# Patient Record
Sex: MALE | Race: WHITE | NOT HISPANIC OR LATINO
[De-identification: names, ages, dates, MRNs, and addresses within clinical notes are randomized per-mention and may not be internally consistent; named-entity substitution may affect disease eponyms.]

---

## 2018-11-21 ENCOUNTER — APPOINTMENT (OUTPATIENT)
Dept: ORTHOPEDIC SURGERY | Facility: CLINIC | Age: 63
End: 2018-11-21

## 2018-12-03 ENCOUNTER — APPOINTMENT (OUTPATIENT)
Dept: ORTHOPEDIC SURGERY | Facility: CLINIC | Age: 63
End: 2018-12-03
Payer: COMMERCIAL

## 2018-12-03 VITALS — SYSTOLIC BLOOD PRESSURE: 128 MMHG | HEART RATE: 78 BPM | DIASTOLIC BLOOD PRESSURE: 81 MMHG

## 2018-12-03 VITALS — WEIGHT: 245 LBS | BODY MASS INDEX: 35.07 KG/M2 | HEIGHT: 70 IN

## 2018-12-03 DIAGNOSIS — M51.26 OTHER INTERVERTEBRAL DISC DISPLACEMENT, LUMBAR REGION: ICD-10-CM

## 2018-12-03 DIAGNOSIS — M48.07 SPINAL STENOSIS, LUMBOSACRAL REGION: ICD-10-CM

## 2018-12-03 PROCEDURE — 99204 OFFICE O/P NEW MOD 45 MIN: CPT

## 2018-12-05 RX ORDER — DICLOFENAC SODIUM 75 MG/1
75 TABLET, DELAYED RELEASE ORAL
Qty: 1 | Refills: 1 | Status: ACTIVE | COMMUNITY
Start: 2018-12-05 | End: 1900-01-01

## 2022-08-01 ENCOUNTER — OUTPATIENT (OUTPATIENT)
Dept: OUTPATIENT SERVICES | Facility: HOSPITAL | Age: 67
LOS: 1 days | End: 2022-08-01
Payer: MEDICARE

## 2022-08-01 VITALS
RESPIRATION RATE: 14 BRPM | WEIGHT: 259.93 LBS | SYSTOLIC BLOOD PRESSURE: 143 MMHG | OXYGEN SATURATION: 97 % | HEIGHT: 70 IN | TEMPERATURE: 97 F | HEART RATE: 86 BPM | DIASTOLIC BLOOD PRESSURE: 79 MMHG

## 2022-08-01 DIAGNOSIS — N40.1 BENIGN PROSTATIC HYPERPLASIA WITH LOWER URINARY TRACT SYMPTOMS: ICD-10-CM

## 2022-08-01 DIAGNOSIS — Z98.890 OTHER SPECIFIED POSTPROCEDURAL STATES: Chronic | ICD-10-CM

## 2022-08-01 DIAGNOSIS — Z01.818 ENCOUNTER FOR OTHER PREPROCEDURAL EXAMINATION: ICD-10-CM

## 2022-08-01 LAB
A1C WITH ESTIMATED AVERAGE GLUCOSE RESULT: 6.3 % — HIGH (ref 4–5.6)
ALBUMIN SERPL ELPH-MCNC: 3.8 G/DL — SIGNIFICANT CHANGE UP (ref 3.3–5)
ALP SERPL-CCNC: 82 U/L — SIGNIFICANT CHANGE UP (ref 40–120)
ALT FLD-CCNC: 30 U/L — SIGNIFICANT CHANGE UP (ref 12–78)
ANION GAP SERPL CALC-SCNC: 7 MMOL/L — SIGNIFICANT CHANGE UP (ref 5–17)
APPEARANCE UR: ABNORMAL
AST SERPL-CCNC: 18 U/L — SIGNIFICANT CHANGE UP (ref 15–37)
BILIRUB SERPL-MCNC: 0.7 MG/DL — SIGNIFICANT CHANGE UP (ref 0.2–1.2)
BILIRUB UR-MCNC: NEGATIVE — SIGNIFICANT CHANGE UP
BUN SERPL-MCNC: 19 MG/DL — SIGNIFICANT CHANGE UP (ref 7–23)
CALCIUM SERPL-MCNC: 9.4 MG/DL — SIGNIFICANT CHANGE UP (ref 8.5–10.1)
CHLORIDE SERPL-SCNC: 110 MMOL/L — HIGH (ref 96–108)
CO2 SERPL-SCNC: 24 MMOL/L — SIGNIFICANT CHANGE UP (ref 22–31)
COLOR SPEC: YELLOW — SIGNIFICANT CHANGE UP
CREAT SERPL-MCNC: 1 MG/DL — SIGNIFICANT CHANGE UP (ref 0.5–1.3)
DIFF PNL FLD: ABNORMAL
EGFR: 82 ML/MIN/1.73M2 — SIGNIFICANT CHANGE UP
ESTIMATED AVERAGE GLUCOSE: 134 MG/DL — HIGH (ref 68–114)
GLUCOSE SERPL-MCNC: 124 MG/DL — HIGH (ref 70–99)
GLUCOSE UR QL: NEGATIVE — SIGNIFICANT CHANGE UP
HCT VFR BLD CALC: 46.7 % — SIGNIFICANT CHANGE UP (ref 39–50)
HGB BLD-MCNC: 15.1 G/DL — SIGNIFICANT CHANGE UP (ref 13–17)
KETONES UR-MCNC: NEGATIVE — SIGNIFICANT CHANGE UP
LEUKOCYTE ESTERASE UR-ACNC: ABNORMAL
MCHC RBC-ENTMCNC: 28.2 PG — SIGNIFICANT CHANGE UP (ref 27–34)
MCHC RBC-ENTMCNC: 32.3 GM/DL — SIGNIFICANT CHANGE UP (ref 32–36)
MCV RBC AUTO: 87.1 FL — SIGNIFICANT CHANGE UP (ref 80–100)
NITRITE UR-MCNC: NEGATIVE — SIGNIFICANT CHANGE UP
NRBC # BLD: 0 /100 WBCS — SIGNIFICANT CHANGE UP (ref 0–0)
PH UR: 5 — SIGNIFICANT CHANGE UP (ref 5–8)
PLATELET # BLD AUTO: 282 K/UL — SIGNIFICANT CHANGE UP (ref 150–400)
POTASSIUM SERPL-MCNC: 4.5 MMOL/L — SIGNIFICANT CHANGE UP (ref 3.5–5.3)
POTASSIUM SERPL-SCNC: 4.5 MMOL/L — SIGNIFICANT CHANGE UP (ref 3.5–5.3)
PROT SERPL-MCNC: 7.6 G/DL — SIGNIFICANT CHANGE UP (ref 6–8.3)
PROT UR-MCNC: 100
RBC # BLD: 5.36 M/UL — SIGNIFICANT CHANGE UP (ref 4.2–5.8)
RBC # FLD: 13.2 % — SIGNIFICANT CHANGE UP (ref 10.3–14.5)
SODIUM SERPL-SCNC: 141 MMOL/L — SIGNIFICANT CHANGE UP (ref 135–145)
SP GR SPEC: 1.02 — SIGNIFICANT CHANGE UP (ref 1.01–1.02)
UROBILINOGEN FLD QL: NEGATIVE — SIGNIFICANT CHANGE UP
WBC # BLD: 9.75 K/UL — SIGNIFICANT CHANGE UP (ref 3.8–10.5)
WBC # FLD AUTO: 9.75 K/UL — SIGNIFICANT CHANGE UP (ref 3.8–10.5)

## 2022-08-01 PROCEDURE — 80053 COMPREHEN METABOLIC PANEL: CPT

## 2022-08-01 PROCEDURE — 85027 COMPLETE CBC AUTOMATED: CPT

## 2022-08-01 PROCEDURE — 36415 COLL VENOUS BLD VENIPUNCTURE: CPT

## 2022-08-01 PROCEDURE — G0463: CPT

## 2022-08-01 PROCEDURE — 81001 URINALYSIS AUTO W/SCOPE: CPT

## 2022-08-01 PROCEDURE — 93005 ELECTROCARDIOGRAM TRACING: CPT

## 2022-08-01 PROCEDURE — 93010 ELECTROCARDIOGRAM REPORT: CPT

## 2022-08-01 PROCEDURE — 87086 URINE CULTURE/COLONY COUNT: CPT

## 2022-08-01 PROCEDURE — 83036 HEMOGLOBIN GLYCOSYLATED A1C: CPT

## 2022-08-01 NOTE — H&P PST ADULT - LAST CARDIAC ANGIOGRAM/IMAGING
Carotid Doppler 9/10/2021 20% Bilateral carotid disease, vertebral arteries show antegrade flow ALSO 7/25/22 Normal Size AAA Carotid Doppler 9/10/2021 20% Bilateral carotid disease, vertebral arteries show antegrade flow ALSO 7/25/22 Normal Size AAA as per cardiologist pt DOES NOT HAVE AN AAA

## 2022-08-01 NOTE — H&P PST ADULT - NSICDXPASTMEDICALHX_GEN_ALL_CORE_FT
PAST MEDICAL HISTORY:  Anxiety     Asthma     Bilateral carpal tunnel syndrome     Coronary artery disease     Glaucoma bilat. " elevated pressure"    Herniated disc     Hyperlipidemia     Hypertension     Kidney stone on right side     Neuropathy fingers ,hands ,feet-bilateral    Type 2 diabetes mellitus      PAST MEDICAL HISTORY:  Anxiety     Asthma Denies use of Inhaler at this time    Bilateral carpal tunnel syndrome     Coronary artery disease     Glaucoma bilat. " elevated pressure"- notes eye drops have been stopped for a year now    Herniated disc     Hyperlipidemia     Hypertension     Kidney stone on right side     Neuropathy fingers ,hands ,feet-bilateral    Stented coronary artery 2010 RIGHT Coronary Artery Stent    Type 2 diabetes mellitus

## 2022-08-01 NOTE — H&P PST ADULT - LAST ECHOCARDIOGRAM
ECHO 7/25/22= Left ventricle cavity normal in size, normal global wall motion, LVEF = 65% mild tricuspid regurgitation trace mild regurgitation

## 2022-08-01 NOTE — H&P PST ADULT - GENERAL COMMENTS
Denies any travel in the last 2 weeks - denies any current covid symptoms or any recent exposure to anyone with known or suspected covid

## 2022-08-01 NOTE — H&P PST ADULT - PROBLEM SELECTOR PLAN 1
PST Labs; CBC, CMP, HgB A1C, U/A, Urine Culture, EKG. Medical and Cardiac clearances on chart. Pt instructed to stop any NSAIDS/Herbal Supplements between now and procedure. PST Labs; CBC, CMP, HgB A1C, U/A, Urine Culture, EKG. Medical and Cardiac clearances on chart. Pt instructed to stop any NSAIDS/Herbal Supplements between now and procedure. He was instructed to HOLD his Metformin morning of procedure. He presently does NOT see Endo for management of his Diabetes his PCP manages his Diabetes. Notes his last A1C was on the 5's and he does not normally check blood glucose levels at home. Discussed with pt he would need to see Endo if today's A1C comes back > 9 however I do not think that will be the case. Morning of procedure he may take his Amlodipine, Irbesartan, Atorvastatin and his Baby ASA with small sip of water. Pt WILL REMAIN on his Baby ASA secondary to presence of a cardiac stent. This has been confirmed with Dr Beard. Pre-op instructions given to pt with understanding verbalized. Discussed with pt he would need to have a COVID swab done at [facility] drive thru prior to procedure. Informed pt [facility]mitting department will contact him to schedule his COVID swab. Provided pt with both address as well as phone number to call [facility] should he have any questions. Pr verbalizes understanding of all instructions discussed today at [facility]. All questions addressed with pt prior to him leaving the [facility] department today.

## 2022-08-01 NOTE — H&P PST ADULT - NSICDXPASTSURGICALHX_GEN_ALL_CORE_FT
PAST SURGICAL HISTORY:  S/P angioplasty with stent x 1 2011    S/P hernia surgery 2010    S/P shoulder surgery 1971, 1975    Tendon laceration left hand 1988     PAST SURGICAL HISTORY:  H/O colonoscopy     S/P angioplasty with stent x 1 2011    S/P hernia surgery 2010    S/P shoulder surgery 1971, 1975- LEFT Shoulder    Tendon laceration left hand 1988

## 2022-08-01 NOTE — H&P PST ADULT - NEGATIVE ENMT SYMPTOMS
no hearing difficulty/no sinus symptoms/no nasal congestion/no nasal discharge/no abnormal taste sensation/no gum bleeding/no throat pain/no dysphagia

## 2022-08-01 NOTE — H&P PST ADULT - HISTORY OF PRESENT ILLNESS
67 year old male PMH CAD, FEI,  HTN, Type 2 DM, Hypercholesterolemia, heart Murmur,  Arthritis, back Pain, Enlarged Prostate with Lower Urinary Tract Symptoms; presents today for PST prior to Greenlight laser Prostate with Dr Himanshu Beard on 8/12/2022.  67 year old male PMH CAD, FEI, (not currently on CPAP), HTN, Type 2 DM, Hypercholesterolemia, Heart Murmur, Glaucoma (Currently off eye drops),  Arthritis, back Pain, Enlarged Prostate with Lower Urinary Tract Symptoms; presents today for PST prior to Greenlight laser Prostate with Dr Himanshu Beard on 8/12/2022. Pt notes H/O enlarged prostate; notes difficulty fully emptying his bladder, increased urinary frequency as well as nocturia. Denies any Dysuria or hematuria. Following discussions with Dr Beard regarding treatment options pt is electing for scheduled procedure.

## 2022-08-01 NOTE — H&P PST ADULT - ASSESSMENT
67 year old male PMH HTN, Type 2 DM, Hypercholesterolemia, heart Murmur,  Arthritis, back Pain, Enlarged Prostate with Lower Urinary Tract Symptoms; scheduled for Greenlight laser Prostate with Dr Himanshu Beard on 8/12/2022.      67 year old male PMH CAD, FEI, (not currently on CPAP), HTN, Type 2 DM, Hypercholesterolemia, Heart Murmur, Glaucoma (Currently off eye drops),  Arthritis, back Pain, Enlarged Prostate with Lower Urinary Tract Symptoms; presents today for PST prior to Greenlight laser Prostate with Dr Himanshu Beard on 8/12/2022. Pt notes H/O enlarged prostate; notes difficulty fully emptying his bladder, increased urinary frequency as well as nocturia. Denies any Dysuria or hematuria. Following discussions with Dr Beard regarding treatment options pt is electing for scheduled procedure.

## 2022-08-02 LAB
CULTURE RESULTS: NO GROWTH — SIGNIFICANT CHANGE UP
SPECIMEN SOURCE: SIGNIFICANT CHANGE UP

## 2022-08-09 PROBLEM — E11.9 TYPE 2 DIABETES MELLITUS WITHOUT COMPLICATIONS: Chronic | Status: ACTIVE | Noted: 2022-08-01

## 2022-08-09 PROBLEM — Z95.5 PRESENCE OF CORONARY ANGIOPLASTY IMPLANT AND GRAFT: Chronic | Status: ACTIVE | Noted: 2022-08-01

## 2022-08-10 ENCOUNTER — OUTPATIENT (OUTPATIENT)
Dept: OUTPATIENT SERVICES | Facility: HOSPITAL | Age: 67
LOS: 1 days | End: 2022-08-10
Payer: MEDICARE

## 2022-08-10 DIAGNOSIS — Z20.828 CONTACT WITH AND (SUSPECTED) EXPOSURE TO OTHER VIRAL COMMUNICABLE DISEASES: ICD-10-CM

## 2022-08-10 DIAGNOSIS — Z98.890 OTHER SPECIFIED POSTPROCEDURAL STATES: Chronic | ICD-10-CM

## 2022-08-10 LAB — SARS-COV-2 RNA SPEC QL NAA+PROBE: SIGNIFICANT CHANGE UP

## 2022-08-10 PROCEDURE — U0003: CPT

## 2022-08-10 PROCEDURE — U0005: CPT

## 2022-08-12 ENCOUNTER — OUTPATIENT (OUTPATIENT)
Dept: OUTPATIENT SERVICES | Facility: HOSPITAL | Age: 67
LOS: 1 days | End: 2022-08-12
Payer: MEDICARE

## 2022-08-12 DIAGNOSIS — Z98.890 OTHER SPECIFIED POSTPROCEDURAL STATES: Chronic | ICD-10-CM

## 2022-08-12 PROCEDURE — 82962 GLUCOSE BLOOD TEST: CPT

## 2022-08-15 DIAGNOSIS — N40.1 BENIGN PROSTATIC HYPERPLASIA WITH LOWER URINARY TRACT SYMPTOMS: ICD-10-CM

## 2022-09-27 ENCOUNTER — OUTPATIENT (OUTPATIENT)
Dept: OUTPATIENT SERVICES | Facility: HOSPITAL | Age: 67
LOS: 1 days | End: 2022-09-27
Payer: MEDICARE

## 2022-09-27 ENCOUNTER — TRANSCRIPTION ENCOUNTER (OUTPATIENT)
Age: 67
End: 2022-09-27

## 2022-09-27 DIAGNOSIS — Z98.890 OTHER SPECIFIED POSTPROCEDURAL STATES: Chronic | ICD-10-CM

## 2022-09-27 DIAGNOSIS — N40.1 BENIGN PROSTATIC HYPERPLASIA WITH LOWER URINARY TRACT SYMPTOMS: ICD-10-CM

## 2022-09-27 DIAGNOSIS — Z11.59 ENCOUNTER FOR SCREENING FOR OTHER VIRAL DISEASES: ICD-10-CM

## 2022-09-27 LAB
ANION GAP SERPL CALC-SCNC: 4 MMOL/L — LOW (ref 5–17)
APPEARANCE UR: ABNORMAL
BILIRUB UR-MCNC: NEGATIVE — SIGNIFICANT CHANGE UP
BUN SERPL-MCNC: 18 MG/DL — SIGNIFICANT CHANGE UP (ref 7–23)
CALCIUM SERPL-MCNC: 9.2 MG/DL — SIGNIFICANT CHANGE UP (ref 8.5–10.1)
CHLORIDE SERPL-SCNC: 108 MMOL/L — SIGNIFICANT CHANGE UP (ref 96–108)
CO2 SERPL-SCNC: 29 MMOL/L — SIGNIFICANT CHANGE UP (ref 22–31)
COLOR SPEC: YELLOW — SIGNIFICANT CHANGE UP
CREAT SERPL-MCNC: 1.1 MG/DL — SIGNIFICANT CHANGE UP (ref 0.5–1.3)
DIFF PNL FLD: ABNORMAL
EGFR: 73 ML/MIN/1.73M2 — SIGNIFICANT CHANGE UP
GLUCOSE SERPL-MCNC: 119 MG/DL — HIGH (ref 70–99)
GLUCOSE UR QL: NEGATIVE — SIGNIFICANT CHANGE UP
HCT VFR BLD CALC: 44.9 % — SIGNIFICANT CHANGE UP (ref 39–50)
HGB BLD-MCNC: 14.5 G/DL — SIGNIFICANT CHANGE UP (ref 13–17)
KETONES UR-MCNC: NEGATIVE — SIGNIFICANT CHANGE UP
LEUKOCYTE ESTERASE UR-ACNC: ABNORMAL
MCHC RBC-ENTMCNC: 27.8 PG — SIGNIFICANT CHANGE UP (ref 27–34)
MCHC RBC-ENTMCNC: 32.3 GM/DL — SIGNIFICANT CHANGE UP (ref 32–36)
MCV RBC AUTO: 86.2 FL — SIGNIFICANT CHANGE UP (ref 80–100)
NITRITE UR-MCNC: NEGATIVE — SIGNIFICANT CHANGE UP
NRBC # BLD: 0 /100 WBCS — SIGNIFICANT CHANGE UP (ref 0–0)
PH UR: 5 — SIGNIFICANT CHANGE UP (ref 5–8)
PLATELET # BLD AUTO: 278 K/UL — SIGNIFICANT CHANGE UP (ref 150–400)
POTASSIUM SERPL-MCNC: 4.8 MMOL/L — SIGNIFICANT CHANGE UP (ref 3.5–5.3)
POTASSIUM SERPL-SCNC: 4.8 MMOL/L — SIGNIFICANT CHANGE UP (ref 3.5–5.3)
PROT UR-MCNC: 30 MG/DL
RBC # BLD: 5.21 M/UL — SIGNIFICANT CHANGE UP (ref 4.2–5.8)
RBC # FLD: 13.6 % — SIGNIFICANT CHANGE UP (ref 10.3–14.5)
SARS-COV-2 RNA SPEC QL NAA+PROBE: SIGNIFICANT CHANGE UP
SODIUM SERPL-SCNC: 141 MMOL/L — SIGNIFICANT CHANGE UP (ref 135–145)
SP GR SPEC: 1.02 — SIGNIFICANT CHANGE UP (ref 1.01–1.02)
UROBILINOGEN FLD QL: NEGATIVE — SIGNIFICANT CHANGE UP
WBC # BLD: 9.9 K/UL — SIGNIFICANT CHANGE UP (ref 3.8–10.5)
WBC # FLD AUTO: 9.9 K/UL — SIGNIFICANT CHANGE UP (ref 3.8–10.5)

## 2022-09-27 PROCEDURE — 87086 URINE CULTURE/COLONY COUNT: CPT

## 2022-09-27 PROCEDURE — 87635 SARS-COV-2 COVID-19 AMP PRB: CPT

## 2022-09-27 PROCEDURE — 85027 COMPLETE CBC AUTOMATED: CPT

## 2022-09-27 PROCEDURE — 81001 URINALYSIS AUTO W/SCOPE: CPT

## 2022-09-27 PROCEDURE — 36415 COLL VENOUS BLD VENIPUNCTURE: CPT

## 2022-09-27 PROCEDURE — 80048 BASIC METABOLIC PNL TOTAL CA: CPT

## 2022-09-27 NOTE — ASU PATIENT PROFILE, ADULT - NSICDXPASTSURGICALHX_GEN_ALL_CORE_FT
PAST SURGICAL HISTORY:  H/O colonoscopy     S/P angioplasty with stent x 1 2011    S/P hernia surgery 2010    S/P shoulder surgery 1971, 1975- LEFT Shoulder    Tendon laceration left hand 1988

## 2022-09-27 NOTE — ASU PATIENT PROFILE, ADULT - NSICDXPASTMEDICALHX_GEN_ALL_CORE_FT
PAST MEDICAL HISTORY:  Anxiety     Asthma Denies use of Inhaler at this time    Bilateral carpal tunnel syndrome     Coronary artery disease     Glaucoma bilat. " elevated pressure"- notes eye drops have been stopped for a year now    Herniated disc     Hyperlipidemia     Hypertension     Kidney stone on right side     Neuropathy fingers ,hands ,feet-bilateral    Stented coronary artery 2010 RIGHT Coronary Artery Stent    Type 2 diabetes mellitus

## 2022-09-27 NOTE — ASU PATIENT PROFILE, ADULT - FALL HARM RISK - UNIVERSAL INTERVENTIONS
Bed in lowest position, wheels locked, appropriate side rails in place/Call bell, personal items and telephone in reach/Instruct patient to call for assistance before getting out of bed or chair/Non-slip footwear when patient is out of bed/[place] to call system/Physically safe environment - no spills, clutter or unnecessary equipment/Purposeful Proactive Rounding/Room/bathroom lighting operational, light cord in reach

## 2022-09-28 ENCOUNTER — TRANSCRIPTION ENCOUNTER (OUTPATIENT)
Age: 67
End: 2022-09-28

## 2022-09-28 ENCOUNTER — RESULT REVIEW (OUTPATIENT)
Age: 67
End: 2022-09-28

## 2022-09-28 ENCOUNTER — OUTPATIENT (OUTPATIENT)
Dept: OUTPATIENT SERVICES | Facility: HOSPITAL | Age: 67
LOS: 1 days | End: 2022-09-28
Payer: MEDICARE

## 2022-09-28 VITALS
HEART RATE: 85 BPM | OXYGEN SATURATION: 98 % | RESPIRATION RATE: 18 BRPM | SYSTOLIC BLOOD PRESSURE: 154 MMHG | WEIGHT: 259.93 LBS | DIASTOLIC BLOOD PRESSURE: 84 MMHG | TEMPERATURE: 98 F | HEIGHT: 70 IN

## 2022-09-28 VITALS
DIASTOLIC BLOOD PRESSURE: 76 MMHG | HEART RATE: 78 BPM | RESPIRATION RATE: 16 BRPM | TEMPERATURE: 97 F | SYSTOLIC BLOOD PRESSURE: 114 MMHG | OXYGEN SATURATION: 97 %

## 2022-09-28 DIAGNOSIS — Z98.890 OTHER SPECIFIED POSTPROCEDURAL STATES: Chronic | ICD-10-CM

## 2022-09-28 DIAGNOSIS — N40.1 BENIGN PROSTATIC HYPERPLASIA WITH LOWER URINARY TRACT SYMPTOMS: ICD-10-CM

## 2022-09-28 LAB
CULTURE RESULTS: SIGNIFICANT CHANGE UP
SPECIMEN SOURCE: SIGNIFICANT CHANGE UP

## 2022-09-28 PROCEDURE — 88300 SURGICAL PATH GROSS: CPT | Mod: 26

## 2022-09-28 PROCEDURE — 88300 SURGICAL PATH GROSS: CPT

## 2022-09-28 PROCEDURE — 52353 CYSTOURETERO W/LITHOTRIPSY: CPT

## 2022-09-28 PROCEDURE — 52648 LASER SURGERY OF PROSTATE: CPT

## 2022-09-28 PROCEDURE — C1889: CPT

## 2022-09-28 PROCEDURE — 82365 CALCULUS SPECTROSCOPY: CPT

## 2022-09-28 PROCEDURE — 82962 GLUCOSE BLOOD TEST: CPT

## 2022-09-28 DEVICE — LASER FIBER HOLMIUM 600: Type: IMPLANTABLE DEVICE | Status: FUNCTIONAL

## 2022-09-28 DEVICE — FIBER MOXY REG: Type: IMPLANTABLE DEVICE | Status: FUNCTIONAL

## 2022-09-28 RX ORDER — SODIUM CHLORIDE 9 MG/ML
1000 INJECTION, SOLUTION INTRAVENOUS
Refills: 0 | Status: DISCONTINUED | OUTPATIENT
Start: 2022-09-28 | End: 2022-09-28

## 2022-09-28 RX ORDER — CEFUROXIME AXETIL 250 MG
1 TABLET ORAL
Qty: 6 | Refills: 0
Start: 2022-09-28 | End: 2022-09-30

## 2022-09-28 RX ORDER — DULAGLUTIDE 4.5 MG/.5ML
0 INJECTION, SOLUTION SUBCUTANEOUS
Qty: 0 | Refills: 0 | DISCHARGE

## 2022-09-28 RX ORDER — CEFTRIAXONE 500 MG/1
1000 INJECTION, POWDER, FOR SOLUTION INTRAMUSCULAR; INTRAVENOUS ONCE
Refills: 0 | Status: COMPLETED | OUTPATIENT
Start: 2022-09-28 | End: 2022-09-28

## 2022-09-28 RX ORDER — HYDROMORPHONE HYDROCHLORIDE 2 MG/ML
0.5 INJECTION INTRAMUSCULAR; INTRAVENOUS; SUBCUTANEOUS
Refills: 0 | Status: DISCONTINUED | OUTPATIENT
Start: 2022-09-28 | End: 2022-09-28

## 2022-09-28 RX ORDER — AMLODIPINE BESYLATE 2.5 MG/1
1 TABLET ORAL
Qty: 0 | Refills: 0 | DISCHARGE

## 2022-09-28 RX ORDER — TAMSULOSIN HYDROCHLORIDE 0.4 MG/1
2 CAPSULE ORAL
Qty: 0 | Refills: 0 | DISCHARGE

## 2022-09-28 RX ORDER — METOCLOPRAMIDE HCL 10 MG
10 TABLET ORAL ONCE
Refills: 0 | Status: DISCONTINUED | OUTPATIENT
Start: 2022-09-28 | End: 2022-09-28

## 2022-09-28 RX ORDER — HYDROMORPHONE HYDROCHLORIDE 2 MG/ML
1 INJECTION INTRAMUSCULAR; INTRAVENOUS; SUBCUTANEOUS
Refills: 0 | Status: DISCONTINUED | OUTPATIENT
Start: 2022-09-28 | End: 2022-09-28

## 2022-09-28 RX ORDER — DUTASTERIDE 0.5 MG/1
1 CAPSULE, LIQUID FILLED ORAL
Qty: 0 | Refills: 0 | DISCHARGE

## 2022-09-28 RX ORDER — ASPIRIN/CALCIUM CARB/MAGNESIUM 324 MG
1 TABLET ORAL
Qty: 0 | Refills: 0 | DISCHARGE

## 2022-09-28 RX ORDER — ATORVASTATIN CALCIUM 80 MG/1
1 TABLET, FILM COATED ORAL
Qty: 0 | Refills: 0 | DISCHARGE

## 2022-09-28 RX ORDER — METFORMIN HYDROCHLORIDE 850 MG/1
1 TABLET ORAL
Qty: 0 | Refills: 0 | DISCHARGE

## 2022-09-28 RX ORDER — IRBESARTAN 75 MG/1
1 TABLET ORAL
Qty: 0 | Refills: 0 | DISCHARGE

## 2022-09-28 RX ORDER — PHENAZOPYRIDINE HCL 100 MG
1 TABLET ORAL
Qty: 30 | Refills: 0
Start: 2022-09-28 | End: 2022-10-07

## 2022-09-28 RX ORDER — ONDANSETRON 8 MG/1
4 TABLET, FILM COATED ORAL ONCE
Refills: 0 | Status: DISCONTINUED | OUTPATIENT
Start: 2022-09-28 | End: 2022-09-28

## 2022-09-28 NOTE — BRIEF OPERATIVE NOTE - NSICDXBRIEFPROCEDURE_GEN_ALL_CORE_FT
PROCEDURES:  Photoselective vaporization of prostate (PVP) with GreenLight laser 28-Sep-2022 12:52:01  Himanshu Beard  Cystoscopic laser lithotripsy of bladder calculus 28-Sep-2022 12:52:21  Himanshu Beard

## 2022-09-28 NOTE — ASU DISCHARGE PLAN (ADULT/PEDIATRIC) - CARE PROVIDER_API CALL
Himanshu Beard)  Urology  [address], [address]  [place] [address]  Phone: [phone]  Fax: [phone]  Follow Up Time:

## 2022-09-28 NOTE — ASU DISCHARGE PLAN (ADULT/PEDIATRIC) - NS MD DC FALL RISK RISK
For information on Fall & Injury Prevention, visit: [email or web address] OR  [email or web address] OR  https://www.cdc.gov/steadi/patient.html

## 2022-09-28 NOTE — BRIEF OPERATIVE NOTE - NSICDXBRIEFPOSTOP_GEN_ALL_CORE_FT
POST-OP DIAGNOSIS:  BPH with urinary obstruction 28-Sep-2022 12:53:40  Himanshu Beard  Bladder calculi 28-Sep-2022 12:53:50  Himanshu Beard

## 2022-09-29 LAB — SURGICAL PATHOLOGY STUDY: SIGNIFICANT CHANGE UP

## 2022-10-04 LAB — NIDUS STONE QN: SIGNIFICANT CHANGE UP

## 2022-12-15 ENCOUNTER — APPOINTMENT (RX ONLY)
Dept: URBAN - METROPOLITAN AREA CLINIC 100 | Facility: CLINIC | Age: 67
Setting detail: DERMATOLOGY
End: 2022-12-15

## 2022-12-15 DIAGNOSIS — L82.1 OTHER SEBORRHEIC KERATOSIS: ICD-10-CM

## 2022-12-15 DIAGNOSIS — Z71.89 OTHER SPECIFIED COUNSELING: ICD-10-CM

## 2022-12-15 DIAGNOSIS — L85.3 XEROSIS CUTIS: ICD-10-CM

## 2022-12-15 PROCEDURE — 99203 OFFICE O/P NEW LOW 30 MIN: CPT

## 2022-12-15 PROCEDURE — ? OTC TREATMENT REGIMEN

## 2022-12-15 PROCEDURE — ? COUNSELING

## 2022-12-15 ASSESSMENT — LOCATION SIMPLE DESCRIPTION DERM
LOCATION SIMPLE: RIGHT UPPER BACK
LOCATION SIMPLE: LEFT UPPER ARM
LOCATION SIMPLE: RIGHT UPPER ARM
LOCATION SIMPLE: RIGHT THIGH
LOCATION SIMPLE: LEFT THIGH

## 2022-12-15 ASSESSMENT — LOCATION ZONE DERM
LOCATION ZONE: LEG
LOCATION ZONE: TRUNK
LOCATION ZONE: ARM

## 2022-12-15 ASSESSMENT — LOCATION DETAILED DESCRIPTION DERM
LOCATION DETAILED: RIGHT MEDIAL UPPER BACK
LOCATION DETAILED: RIGHT ANTERIOR DISTAL UPPER ARM
LOCATION DETAILED: LEFT ANTERIOR DISTAL THIGH
LOCATION DETAILED: LEFT ANTERIOR DISTAL UPPER ARM
LOCATION DETAILED: RIGHT ANTERIOR DISTAL THIGH

## 2022-12-15 NOTE — PROCEDURE: OTC TREATMENT REGIMEN
Patient Specific Otc Recommendations (Will Not Stick From Patient To Patient): Cerave moisturizer daily handout
Detail Level: Zone

## 2023-06-29 NOTE — H&P PST ADULT - FALL HARM RISK - UNIVERSAL INTERVENTIONS
. Bed in lowest position, wheels locked, appropriate side rails in place/Call bell, personal items and telephone in reach/Instruct patient to call for assistance before getting out of bed or chair/Non-slip footwear when patient is out of bed/[place] to call system/Physically safe environment - no spills, clutter or unnecessary equipment/Purposeful Proactive Rounding/Room/bathroom lighting operational, light cord in reach

## 2023-10-26 NOTE — ASU PREOP CHECKLIST - NS ASU TO WHOM HOLDING TO OR
Continue rivastigmine  Continue taper off Wellbutrin. If anxiety worsens despite being on Lexapro perhaps switch to sertraline could be considered. Will reduce carbidopa/levodopa 25.100 1.5 tabs 3 times daily for 2 weeks and then if no worsening of tremor, stiffness, slowness, gait then reduce further to 1 tab 3 times daily. If no return in parkinsonian symptoms after 2 weeks we can further reduce to 1/2 tab 3 times daily for a week and then discontinue. ZEHRA

## 2024-08-01 ENCOUNTER — INPATIENT (INPATIENT)
Facility: HOSPITAL | Age: 69
LOS: 3 days | Discharge: HOME | DRG: 641 | End: 2024-08-05
Attending: INTERNAL MEDICINE | Admitting: STUDENT IN AN ORGANIZED HEALTH CARE EDUCATION/TRAINING PROGRAM
Payer: MEDICARE

## 2024-08-01 ENCOUNTER — NON-APPOINTMENT (OUTPATIENT)
Age: 69
End: 2024-08-01

## 2024-08-01 ENCOUNTER — APPOINTMENT (OUTPATIENT)
Dept: NEPHROLOGY | Facility: CLINIC | Age: 69
End: 2024-08-01
Payer: COMMERCIAL

## 2024-08-01 VITALS
HEIGHT: 70 IN | HEART RATE: 108 BPM | WEIGHT: 213.84 LBS | DIASTOLIC BLOOD PRESSURE: 73 MMHG | OXYGEN SATURATION: 96 % | TEMPERATURE: 97.9 F | BODY MASS INDEX: 30.61 KG/M2 | SYSTOLIC BLOOD PRESSURE: 133 MMHG

## 2024-08-01 VITALS
WEIGHT: 214.07 LBS | HEART RATE: 98 BPM | HEIGHT: 70 IN | SYSTOLIC BLOOD PRESSURE: 129 MMHG | DIASTOLIC BLOOD PRESSURE: 80 MMHG | OXYGEN SATURATION: 98 % | RESPIRATION RATE: 18 BRPM | TEMPERATURE: 99 F

## 2024-08-01 DIAGNOSIS — E87.5 HYPERKALEMIA: ICD-10-CM

## 2024-08-01 DIAGNOSIS — N17.9 ACUTE KIDNEY FAILURE, UNSPECIFIED: ICD-10-CM

## 2024-08-01 DIAGNOSIS — F12.91 CANNABIS USE, UNSPECIFIED, IN REMISSION: ICD-10-CM

## 2024-08-01 DIAGNOSIS — Z78.9 OTHER SPECIFIED HEALTH STATUS: ICD-10-CM

## 2024-08-01 DIAGNOSIS — R80.9 PROTEINURIA, UNSPECIFIED: ICD-10-CM

## 2024-08-01 DIAGNOSIS — Z98.890 OTHER SPECIFIED POSTPROCEDURAL STATES: Chronic | ICD-10-CM

## 2024-08-01 LAB
ALBUMIN SERPL ELPH-MCNC: 3.2 G/DL
ALBUMIN SERPL ELPH-MCNC: 3.3 G/DL — SIGNIFICANT CHANGE UP (ref 3.3–5)
ALP BLD-CCNC: 136 U/L
ALP SERPL-CCNC: 132 U/L — HIGH (ref 40–120)
ALT FLD-CCNC: 25 U/L — SIGNIFICANT CHANGE UP (ref 10–45)
ALT SERPL-CCNC: 26 U/L
ANION GAP SERPL CALC-SCNC: 15 MMOL/L
ANION GAP SERPL CALC-SCNC: 15 MMOL/L — SIGNIFICANT CHANGE UP (ref 5–17)
ANISOCYTOSIS BLD QL: SLIGHT — SIGNIFICANT CHANGE UP
APPEARANCE UR: CLEAR — SIGNIFICANT CHANGE UP
APTT BLD: 29.9 SEC — SIGNIFICANT CHANGE UP (ref 24.5–35.6)
AST SERPL-CCNC: 13 U/L — SIGNIFICANT CHANGE UP (ref 10–40)
AST SERPL-CCNC: 8 U/L
BACTERIA # UR AUTO: NEGATIVE /HPF — SIGNIFICANT CHANGE UP
BASOPHILS # BLD AUTO: 0 K/UL — SIGNIFICANT CHANGE UP (ref 0–0.2)
BASOPHILS NFR BLD AUTO: 0 % — SIGNIFICANT CHANGE UP (ref 0–2)
BILIRUB SERPL-MCNC: 0.3 MG/DL
BILIRUB SERPL-MCNC: 0.4 MG/DL — SIGNIFICANT CHANGE UP (ref 0.2–1.2)
BILIRUB UR-MCNC: NEGATIVE — SIGNIFICANT CHANGE UP
BUN SERPL-MCNC: 35 MG/DL
BUN SERPL-MCNC: 35 MG/DL — HIGH (ref 7–23)
BURR CELLS BLD QL SMEAR: PRESENT — SIGNIFICANT CHANGE UP
CALCIUM SERPL-MCNC: 10 MG/DL — SIGNIFICANT CHANGE UP (ref 8.4–10.5)
CALCIUM SERPL-MCNC: 9.6 MG/DL
CAST: 4 /LPF — SIGNIFICANT CHANGE UP (ref 0–4)
CHLORIDE SERPL-SCNC: 96 MMOL/L
CHLORIDE SERPL-SCNC: 97 MMOL/L — SIGNIFICANT CHANGE UP (ref 96–108)
CO2 SERPL-SCNC: 20 MMOL/L — LOW (ref 22–31)
CO2 SERPL-SCNC: 21 MMOL/L
COLOR SPEC: YELLOW — SIGNIFICANT CHANGE UP
CREAT SERPL-MCNC: 2.02 MG/DL — HIGH (ref 0.5–1.3)
CREAT SERPL-MCNC: 2.15 MG/DL
DACRYOCYTES BLD QL SMEAR: SLIGHT — SIGNIFICANT CHANGE UP
DIFF PNL FLD: NEGATIVE — SIGNIFICANT CHANGE UP
EGFR: 32 ML/MIN/1.73M2
EGFR: 35 ML/MIN/1.73M2 — LOW
ELLIPTOCYTES BLD QL SMEAR: SLIGHT — SIGNIFICANT CHANGE UP
EOSINOPHIL # BLD AUTO: 0.19 K/UL — SIGNIFICANT CHANGE UP (ref 0–0.5)
EOSINOPHIL NFR BLD AUTO: 0.9 % — SIGNIFICANT CHANGE UP (ref 0–6)
GLUCOSE SERPL-MCNC: 130 MG/DL — HIGH (ref 70–99)
GLUCOSE SERPL-MCNC: 141 MG/DL
GLUCOSE UR QL: NEGATIVE MG/DL — SIGNIFICANT CHANGE UP
HCT VFR BLD CALC: 28.5 % — LOW (ref 39–50)
HGB BLD-MCNC: 8.4 G/DL — LOW (ref 13–17)
INR BLD: 1.33 RATIO — HIGH (ref 0.85–1.18)
KETONES UR-MCNC: NEGATIVE MG/DL — SIGNIFICANT CHANGE UP
LEUKOCYTE ESTERASE UR-ACNC: NEGATIVE — SIGNIFICANT CHANGE UP
LYMPHOCYTES # BLD AUTO: 13.9 % — SIGNIFICANT CHANGE UP (ref 13–44)
LYMPHOCYTES # BLD AUTO: 2.89 K/UL — SIGNIFICANT CHANGE UP (ref 1–3.3)
MACROCYTES BLD QL: SLIGHT — SIGNIFICANT CHANGE UP
MAGNESIUM SERPL-MCNC: 2 MG/DL — SIGNIFICANT CHANGE UP (ref 1.6–2.6)
MANUAL SMEAR VERIFICATION: SIGNIFICANT CHANGE UP
MCHC RBC-ENTMCNC: 23.3 PG — LOW (ref 27–34)
MCHC RBC-ENTMCNC: 29.5 GM/DL — LOW (ref 32–36)
MCV RBC AUTO: 78.9 FL — LOW (ref 80–100)
METAMYELOCYTES # FLD: 0.9 % — HIGH (ref 0–0)
MICROCYTES BLD QL: SLIGHT — SIGNIFICANT CHANGE UP
MONOCYTES # BLD AUTO: 1.97 K/UL — HIGH (ref 0–0.9)
MONOCYTES NFR BLD AUTO: 9.5 % — SIGNIFICANT CHANGE UP (ref 2–14)
NEUTROPHILS # BLD AUTO: 15.54 K/UL — HIGH (ref 1.8–7.4)
NEUTROPHILS NFR BLD AUTO: 71.3 % — SIGNIFICANT CHANGE UP (ref 43–77)
NEUTS BAND # BLD: 3.5 % — SIGNIFICANT CHANGE UP (ref 0–8)
NITRITE UR-MCNC: NEGATIVE — SIGNIFICANT CHANGE UP
OVALOCYTES BLD QL SMEAR: SLIGHT — SIGNIFICANT CHANGE UP
PH UR: 5.5 — SIGNIFICANT CHANGE UP (ref 5–8)
PLAT MORPH BLD: ABNORMAL
PLATELET # BLD AUTO: 555 K/UL — HIGH (ref 150–400)
POIKILOCYTOSIS BLD QL AUTO: SLIGHT — SIGNIFICANT CHANGE UP
POLYCHROMASIA BLD QL SMEAR: SLIGHT — SIGNIFICANT CHANGE UP
POTASSIUM SERPL-MCNC: 5.6 MMOL/L — HIGH (ref 3.5–5.3)
POTASSIUM SERPL-SCNC: 5.6 MMOL/L — HIGH (ref 3.5–5.3)
POTASSIUM SERPL-SCNC: 6 MMOL/L
PROT SERPL-MCNC: 7.2 G/DL
PROT SERPL-MCNC: 8 G/DL — SIGNIFICANT CHANGE UP (ref 6–8.3)
PROT UR-MCNC: 30 MG/DL
PROTHROM AB SERPL-ACNC: 14.5 SEC — HIGH (ref 9.5–13)
RBC # BLD: 3.61 M/UL — LOW (ref 4.2–5.8)
RBC # FLD: 17 % — HIGH (ref 10.3–14.5)
RBC BLD AUTO: ABNORMAL
RBC CASTS # UR COMP ASSIST: 1 /HPF — SIGNIFICANT CHANGE UP (ref 0–4)
SODIUM SERPL-SCNC: 132 MMOL/L
SODIUM SERPL-SCNC: 132 MMOL/L — LOW (ref 135–145)
SP GR SPEC: 1.02 — SIGNIFICANT CHANGE UP (ref 1–1.03)
SQUAMOUS # UR AUTO: 1 /HPF — SIGNIFICANT CHANGE UP (ref 0–5)
UROBILINOGEN FLD QL: 0.2 MG/DL — SIGNIFICANT CHANGE UP (ref 0.2–1)
WBC # BLD: 20.77 K/UL — HIGH (ref 3.8–10.5)
WBC # FLD AUTO: 20.77 K/UL — HIGH (ref 3.8–10.5)
WBC UR QL: 3 /HPF — SIGNIFICANT CHANGE UP (ref 0–5)

## 2024-08-01 PROCEDURE — 99205 OFFICE O/P NEW HI 60 MIN: CPT

## 2024-08-01 PROCEDURE — 99291 CRITICAL CARE FIRST HOUR: CPT | Mod: GC

## 2024-08-01 PROCEDURE — 71045 X-RAY EXAM CHEST 1 VIEW: CPT | Mod: 26

## 2024-08-01 RX ORDER — CEFEPIME HYDROCHLORIDE 1 G/1
1000 INJECTION, POWDER, FOR SOLUTION INTRAMUSCULAR; INTRAVENOUS ONCE
Refills: 0 | Status: COMPLETED | OUTPATIENT
Start: 2024-08-01 | End: 2024-08-01

## 2024-08-01 RX ORDER — ACETAMINOPHEN 500 MG
1000 TABLET ORAL ONCE
Refills: 0 | Status: COMPLETED | OUTPATIENT
Start: 2024-08-01 | End: 2024-08-01

## 2024-08-01 RX ORDER — SODIUM ZIRCONIUM CYCLOSILICATE 10 G/10G
10 POWDER, FOR SUSPENSION ORAL
Qty: 7 | Refills: 3 | Status: ACTIVE | COMMUNITY
Start: 2024-08-01 | End: 1900-01-01

## 2024-08-01 RX ORDER — SODIUM ZIRCONIUM CYCLOSILICATE 10 G/10G
10 POWDER, FOR SUSPENSION ORAL ONCE
Refills: 0 | Status: COMPLETED | OUTPATIENT
Start: 2024-08-01 | End: 2024-08-01

## 2024-08-01 RX ADMIN — CEFEPIME HYDROCHLORIDE 100 MILLIGRAM(S): 1 INJECTION, POWDER, FOR SOLUTION INTRAMUSCULAR; INTRAVENOUS at 23:30

## 2024-08-01 RX ADMIN — SODIUM ZIRCONIUM CYCLOSILICATE 10 GRAM(S): 10 POWDER, FOR SUSPENSION ORAL at 23:39

## 2024-08-01 NOTE — REVIEW OF SYSTEMS
[Feeling Tired] : feeling tired [Discharge From Eyes] : no purulent discharge from the eyes [Sore Throat] : no sore throat [Heart Rate Is Fast] : the heart rate was not fast [Chest Pain] : no chest pain [Palpitations] : no palpitations [Lower Ext Edema] : no extremity edema [Shortness Of Breath] : no shortness of breath [Orthopnea] : no orthopnea [Abdominal Pain] : no abdominal pain [Vomiting] : no vomiting [Diarrhea] : no diarrhea [Melena] : no melena [Dysuria] : no dysuria [Incontinence] : no incontinence [Hesitancy] : no urinary hesitancy [Limb Swelling] : no limb swelling [Skin Lesions] : no skin lesions [Skin Wound] : no skin wound [Confused] : no confusion [Convulsions] : no convulsions [Dizziness] : no dizziness [Limb Weakness] : no limb weakness [Suicidal] : not suicidal [Muscle Weakness] : no muscle weakness [Easy Bleeding] : no tendency for easy bleeding

## 2024-08-01 NOTE — ASSESSMENT
[FreeTextEntry1] : Mr. Vila presents to nephrology clinic with his wife for initial evaluation of rising creatinine, referred by Dr. Herring from [facility].  Mr. Vila is a 69-year-old male with pmh of T2DM, HTN, HPL, nephrolithiasis, left carotid artery stenosis and left kidney/urothelial cancer who presents to nephrology clinic for evaluation of rising Cr, now has hyperkalemia (see HPI for detail).  1. Hyperkalemia: Patient's wife reports that serum K was 6.4 from labs done in past couple of days by endocrinology. In-office EKG today did show changes c/w hyperkalemia. - Discussed with patient likely need for hospitalization given hyperkalemia and left hydro - Gave lokelma 10 mg samples in the office - STAT BMP done   2. Elevated Cr: We do not have historical Cr values, but his Cr was 2.4 on 7/2/2024, reported to have been 2.1 several days before. A quick glance at Cr values on his phone show near normal Cr in 2023 before cancer diagnosis and chemotherapy. It is likely he has YANIRA in recent days likely due to left hydronephrosis and concurrent use of RAASi and NSAIDs. - Discussed likely need for hospitalization for definitive tx of left hydronephrosis - Advised to avoid NSAIDs - Advised patient to call the office or go to the ER if develops f/c, n/v  Will call patient with results of BMP and further plans of management

## 2024-08-01 NOTE — ED PROVIDER NOTE - NSICDXFAMILYHX_GEN_ALL_CORE_FT
FAMILY HISTORY:  Father  Still living? No  Family history of type 2 diabetes mellitus in father, Age at diagnosis: Age Unknown  FH: COPD (chronic obstructive pulmonary disease), Age at diagnosis: Age Unknown    Mother  Still living? No  Family history of Alzheimer's disease, Age at diagnosis: Age Unknown

## 2024-08-01 NOTE — HISTORY OF PRESENT ILLNESS
[FreeTextEntry1] : Mr. Vila presents to nephrology clinic with his wife for initial evaluation of rising creatinine, referred by Dr. Herring from [facility].  He is a 69-year-old male with pmh of T2DM, HTN, HPL, nephrolithiasis, left carotid artery stenosis and left kidney/urothelial cancer. He initially presented in 6/2023 with hematuria and kidney stones. Imaging showed a mass in the left kidney lower pole/collecting system region, complicated by mild-moderate hydronephrosis for which he had a stent placed. 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 Received 6 cycles of chemo with carboplatin/gemcitabine 8/2023-12/2023 with good response and has been maintained on avelumab. Ureteral stent was exchanged successfully in 2/2024 but exchange last week at [facility] was unsuccessful. Per patient's wife, the stent was removed but unable to be replaced due to obstruction of the renal pelvis due to "tumor". He was given cephalexin x 7 days, and nephrostomy tube placement was discussed.  Lab results scanned into the patient's chart from [facility] from 7/2/2024 show Cr 2.4 (reportedly increased from 2.1 in recent past - prompting the referral) and K 4.9. A quick review of historical labs on the patient's phone shows near normal Cr in 2023 before cancer diagnosis and starting chemotherapy. Other lab data include WBC 18k, hgb 7.9, platelets 557, albumin 3.1.  Patient's wife also notified me that labs drawn by endocrinology (Dr. Webb) in the past two days showed K 6.4.  He reports mild generalized weakness, fatigue, left flank pain but denies fever, dyspnea, chest pain, orthostasis, n/v/d, hematuria, foamy urine, rash.  He has been taking Advil one tab daily in the past couple of days. He is currently on amlodipine 7.5 mg daily Irbesartan 300 mg daily Trulicity omeprazole Ibuprofen Mag oxide

## 2024-08-01 NOTE — ED ADULT NURSE NOTE - OBJECTIVE STATEMENT
68 y/o M , AXOX4, with a PMH of urethral stents, anemia, t2DM, HTN, HLD, presents to the ED reporting abnormal labs. Blood work from MD office today reveals potassium was 6.2 and was given lokelma by MD. Pt denies chest pain, dyspnea, palpitations, dizziness. Pt with Pt found in gown on stretcher, breathing unlabored on room air, speaking in complete sentences, strong and equal strength in all extremities, sensations intact, abd soft non tender non distended, no edema noted. Safety and comfort measures maintained.

## 2024-08-01 NOTE — ED PROVIDER NOTE - OBJECTIVE STATEMENT
68 y/o M with PMHx of HTN, T2DM, renal cancer, HLD sent in from outside off (Dr. Sen), for hyperkalemia of 6.4, and then 6.2 on outpatient labs with EKG changes. The patient received 10mg of Lokelma and was transferred to ED for further hyper K management. Follows with onc at [facility], had failed placement of a ureteral stent on the L side, so procedure was abandoned, there was discussion regarding, nephostomy tube placement due to persistent hydronephrosis. Also reporting subjective fevers and chills. Denies abdominal pain, chest pain, shortness of breath, palpitations, difficulty breathing, nausea or vomiting. Stopped chemotherapy several months ago, recently immunotherapy 1 month ago.

## 2024-08-01 NOTE — PHYSICAL EXAM
[General Appearance - Alert] : alert [General Appearance - In No Acute Distress] : in no acute distress [General Appearance - Well Developed] : well developed [PERRL With Normal Accommodation] : pupils were equal in size, round, and reactive to light [Oropharynx] : the oropharynx was normal [] : the neck was supple [Respiration, Rhythm And Depth] : normal respiratory rhythm and effort [Auscultation Breath Sounds / Voice Sounds] : lungs were clear to auscultation bilaterally [Heart Rate And Rhythm] : heart rate was normal and rhythm regular [Heart Sounds Gallop] : no gallops [Murmurs] : no murmurs [Heart Sounds Pericardial Friction Rub] : no pericardial rub [Edema] : there was no peripheral edema [Bowel Sounds] : normal bowel sounds [Abdomen Soft] : soft [Abdomen Tenderness] : non-tender [Abnormal Walk] : normal gait [Nail Clubbing] : no clubbing  or cyanosis of the fingernails [Skin Color & Pigmentation] : normal skin color and pigmentation [No Focal Deficits] : no focal deficits [Oriented To Time, Place, And Person] : oriented to person, place, and time

## 2024-08-01 NOTE — ED PROVIDER NOTE - RAPID ASSESSMENT
69-year-old male with pmh of T2DM, HTN,  left kidney/urothelial cancer, sent in from [facility] Sen's office for concern of worsening renal function and elevated potassium of 6.2. Pt given lokelma 10mg today. Pt without any complaints, no abd pain, cp, palpitations, sob or diff breathing.     Patient was rapidly assessed via a telemedicine and/or role of Quick Triage Doctor; a limited history, physical exam and assessment was performed. The patient will be seen and further evaluated in the main emergency department. The remainder of care and evaluation will be conducted by the primary emergency medicine team. Receiving team will follow up on labs, imaging and serially reassess patient as indicated. All further decisions regarding patient care, evaluation and disposition are at the discretion of the receiving primary emergency department team. -Tosha Suh PA-C 69-year-old male with pmh of T2DM, HTN,  left kidney/urothelial cancer, sent in from Dr. Sen's office for concern of worsening renal function and elevated potassium of 6.2. Pt given lokelma 10mg today. Pt without any complaints, no abd pain, cp, palpitations, sob or diff breathing.     Patient was rapidly assessed via a telemedicine and/or role of Quick Triage Doctor; a limited history, physical exam and assessment was performed. The patient will be seen and further evaluated in the main emergency department. The remainder of care and evaluation will be conducted by the primary emergency medicine team. Receiving team will follow up on labs, imaging and serially reassess patient as indicated. All further decisions regarding patient care, evaluation and disposition are at the discretion of the receiving primary emergency department team. -Tosha Suh PA-C 69-year-old male with pmh of T2DM, HTN,  left kidney/urothelial cancer, sent in from Dr. Sen's office for concern of worsening renal function and elevated potassium of 6.2. Pt given lokelma 10mg today. Pt without any complaints, no abd pain, cp, palpitations, sob or diff breathing.     Patient was rapidly assessed via a telemedicine and/or role of Quick Triage Doctor; a limited history, physical exam and assessment was performed. The patient will be seen and further evaluated in the main emergency department. The remainder of care and evaluation will be conducted by the primary emergency medicine team. Receiving team will follow up on labs, imaging and serially reassess patient as indicated. All further decisions regarding patient care, evaluation and disposition are at the discretion of the receiving primary emergency department team. -Tosha Suh PA-C    Patient seen in triage as Q–PA by physician assistant.  Full evaluation including extremity to be performed once patient is transferred to the main ER.  Immediately available consultation.  Himanshu Garcia MD, Facep

## 2024-08-01 NOTE — ED PROVIDER NOTE - ENDOCRINE [+], MLM
Clinic Care Coordination Contact  [facility]/Voicemail    Referral Source: IP Report  Clinical Data:    Date of Admission:  2/20/2022  Date of Discharge:  2/27/2022  Discharge Diagnoses        Principal Problem:    Ureteral stone  Active Problems:    Multiple sclerosis (H)    Morbid obesity (H)    Benign essential hypertension    NARCISA (obstructive sleep apnea)    Sepsis (H)    Lymphedema of both lower extremities    Chronic diastolic congestive heart failure (H)    Hyperkalemia    Acute renal failure, unspecified acute renal failure type (H)    Hypotension due to hypovolemia    History of pulmonary embolism    Urinary tract infection    Hydronephrosis with urinary obstruction due to ureteral calculus        Care Coordinator Outreach  Outreach attempted x 1.  Left message on patient's voicemail with call back information and requested return call.  Plan: . Care Coordinator will try to reach patient again in 1-2 business days.    [facility]   Meenakshi Campbell RN, Care Coordinator   [facility]   E-mail [email or web address]   [phone]     hyperkalemia

## 2024-08-01 NOTE — ED PROVIDER NOTE - CLINICAL SUMMARY MEDICAL DECISION MAKING FREE TEXT BOX
70 y/o M with PMHx of HTN, T2DM, renal cancer, HLD sent in from outside off (Dr. Sen), for hyperkalemia of 6.4, and then 6.2 on outpatient labs with EKG changes. The patient received 10mg of Lokelma and was transferred to ED for further hyper K management. Patient is well appearing, no focal physical exam findings, will evaluate with CMP to assess for level of hyperK, obtain EKG and keep on monitor. Dispo pending labs. May require imaging of abdomen based on resutls of labwork 70 y/o M with PMHx of HTN, T2DM, renal cancer, HLD sent in from outside off (Dr. Sen), for hyperkalemia of 6.4, and then 6.2 on outpatient labs with EKG changes. The patient received 10mg of Lokelma and was transferred to ED for further hyper K management. Patient is well appearing, no focal physical exam findings, will evaluate with CMP to assess for level of hyperK, obtain EKG and keep on monitor. Dispo pending labs. May require imaging of abdomen based on resutls of labwork    ROCÍO Monge MD: Agree with resident/ACP MDM, assessment and plan as above.

## 2024-08-01 NOTE — END OF VISIT
[] : Fellow [FreeTextEntry3] : Labs stat returned with K of 6 and Crt still 2.1 Needs urgent ER eval for likely PCN as this is most likely hydro Inpatient renal and heme onc informed at [facility]

## 2024-08-01 NOTE — ED ADULT TRIAGE NOTE - CHIEF COMPLAINT QUOTE
hx kidney ca    Referred by Dr. Sen due to hyperkalemia. K:6.2, Cr. 2.1- Took [facility] today at noon.   Recent stent removal last week.

## 2024-08-01 NOTE — ED ADULT NURSE NOTE - NSFALLUNIVINTERV_ED_ALL_ED
Bed/Stretcher in lowest position, wheels locked, appropriate side rails in place/Call bell, personal items and telephone in reach/Instruct patient to call for assistance before getting out of bed/chair/stretcher/Non-slip footwear applied when patient is off stretcher/[place] to call system/Physically safe environment - no spills, clutter or unnecessary equipment/Purposeful proactive rounding/Room/bathroom lighting operational, light cord in reach

## 2024-08-01 NOTE — ED PROVIDER NOTE - DIFFERENTIAL DIAGNOSIS
Differential Diagnosis Ddx includes, however, is not limited to: worsening renal failure, electrolyte abnormality, other

## 2024-08-01 NOTE — ED PROVIDER NOTE - PROGRESS NOTE DETAILS
Josias PGY-2: patient noted to have very elevated white count, urine not impressive for infection, now febrile to 101, borderline tachycardic, will order sepsis labs, added flu/COVID and will evaluate with CXR

## 2024-08-01 NOTE — ED ADULT NURSE NOTE - CHIEF COMPLAINT QUOTE
hx kidney ca    Referred by Dr. Sen due to hyperkalemia. K:6.2, Cr. 2.1- Took [facility] today at noon.   Recent stent removal last week.

## 2024-08-02 DIAGNOSIS — E87.5 HYPERKALEMIA: ICD-10-CM

## 2024-08-02 DIAGNOSIS — Z29.9 ENCOUNTER FOR PROPHYLACTIC MEASURES, UNSPECIFIED: ICD-10-CM

## 2024-08-02 DIAGNOSIS — I10 ESSENTIAL (PRIMARY) HYPERTENSION: ICD-10-CM

## 2024-08-02 DIAGNOSIS — A41.9 SEPSIS, UNSPECIFIED ORGANISM: ICD-10-CM

## 2024-08-02 DIAGNOSIS — N13.0 HYDRONEPHROSIS WITH URETEROPELVIC JUNCTION OBSTRUCTION: ICD-10-CM

## 2024-08-02 DIAGNOSIS — E11.9 TYPE 2 DIABETES MELLITUS WITHOUT COMPLICATIONS: ICD-10-CM

## 2024-08-02 DIAGNOSIS — N17.9 ACUTE KIDNEY FAILURE, UNSPECIFIED: ICD-10-CM

## 2024-08-02 LAB
ANION GAP SERPL CALC-SCNC: 14 MMOL/L — SIGNIFICANT CHANGE UP (ref 5–17)
BUN SERPL-MCNC: 32 MG/DL — HIGH (ref 7–23)
CALCIUM SERPL-MCNC: 9.3 MG/DL — SIGNIFICANT CHANGE UP (ref 8.4–10.5)
CHLORIDE SERPL-SCNC: 97 MMOL/L — SIGNIFICANT CHANGE UP (ref 96–108)
CO2 SERPL-SCNC: 20 MMOL/L — LOW (ref 22–31)
CREAT SERPL-MCNC: 1.96 MG/DL — HIGH (ref 0.5–1.3)
CREAT SPEC-SCNC: 106 MG/DL
CREAT/PROT UR: 0.4 RATIO
CULTURE RESULTS: NO GROWTH — SIGNIFICANT CHANGE UP
EGFR: 36 ML/MIN/1.73M2 — LOW
FLUAV AG NPH QL: SIGNIFICANT CHANGE UP
FLUBV AG NPH QL: SIGNIFICANT CHANGE UP
GLUCOSE BLDC GLUCOMTR-MCNC: 123 MG/DL — HIGH (ref 70–99)
GLUCOSE BLDC GLUCOMTR-MCNC: 124 MG/DL — HIGH (ref 70–99)
GLUCOSE BLDC GLUCOMTR-MCNC: 138 MG/DL — HIGH (ref 70–99)
GLUCOSE SERPL-MCNC: 123 MG/DL — HIGH (ref 70–99)
POTASSIUM SERPL-MCNC: 4.7 MMOL/L — SIGNIFICANT CHANGE UP (ref 3.5–5.3)
POTASSIUM SERPL-SCNC: 4.7 MMOL/L — SIGNIFICANT CHANGE UP (ref 3.5–5.3)
PROT UR-MCNC: 44 MG/DL
RSV RNA NPH QL NAA+NON-PROBE: SIGNIFICANT CHANGE UP
SARS-COV-2 RNA SPEC QL NAA+PROBE: SIGNIFICANT CHANGE UP
SODIUM SERPL-SCNC: 131 MMOL/L — LOW (ref 135–145)
SPECIMEN SOURCE: SIGNIFICANT CHANGE UP

## 2024-08-02 PROCEDURE — 74176 CT ABD & PELVIS W/O CONTRAST: CPT | Mod: 26,MC

## 2024-08-02 PROCEDURE — 99223 1ST HOSP IP/OBS HIGH 75: CPT

## 2024-08-02 PROCEDURE — 99233 SBSQ HOSP IP/OBS HIGH 50: CPT | Mod: GC

## 2024-08-02 PROCEDURE — 99233 SBSQ HOSP IP/OBS HIGH 50: CPT

## 2024-08-02 RX ORDER — ACETAMINOPHEN 500 MG
650 TABLET ORAL EVERY 6 HOURS
Refills: 0 | Status: DISCONTINUED | OUTPATIENT
Start: 2024-08-02 | End: 2024-08-05

## 2024-08-02 RX ORDER — DEXTROSE 4 G
12.5 TABLET,CHEWABLE ORAL ONCE
Refills: 0 | Status: DISCONTINUED | OUTPATIENT
Start: 2024-08-02 | End: 2024-08-05

## 2024-08-02 RX ORDER — DEXTROSE 4 G
25 TABLET,CHEWABLE ORAL ONCE
Refills: 0 | Status: DISCONTINUED | OUTPATIENT
Start: 2024-08-02 | End: 2024-08-05

## 2024-08-02 RX ORDER — ACETAMINOPHEN 500 MG
1 TABLET ORAL
Refills: 0 | DISCHARGE

## 2024-08-02 RX ORDER — INSULIN LISPRO 100/ML
VIAL (ML) SUBCUTANEOUS
Refills: 0 | Status: DISCONTINUED | OUTPATIENT
Start: 2024-08-02 | End: 2024-08-05

## 2024-08-02 RX ORDER — ATORVASTATIN CALCIUM 40 MG/1
20 TABLET, FILM COATED ORAL DAILY
Refills: 0 | Status: DISCONTINUED | OUTPATIENT
Start: 2024-08-02 | End: 2024-08-05

## 2024-08-02 RX ORDER — GLUCAGON INJECTION, SOLUTION 0.5 MG/.1ML
1 INJECTION, SOLUTION SUBCUTANEOUS ONCE
Refills: 0 | Status: DISCONTINUED | OUTPATIENT
Start: 2024-08-02 | End: 2024-08-05

## 2024-08-02 RX ORDER — DEXTROSE MONOHYDRATE, SODIUM CHLORIDE, SODIUM LACTATE, CALCIUM CHLORIDE, MAGNESIUM CHLORIDE 1.5; 538; 448; 18.4; 5.08 G/100ML; MG/100ML; MG/100ML; MG/100ML; MG/100ML
1000 SOLUTION INTRAPERITONEAL
Refills: 0 | Status: DISCONTINUED | OUTPATIENT
Start: 2024-08-02 | End: 2024-08-05

## 2024-08-02 RX ORDER — BACTERIOSTATIC SODIUM CHLORIDE 0.9 %
1000 VIAL (ML) INJECTION
Refills: 0 | Status: DISCONTINUED | OUTPATIENT
Start: 2024-08-02 | End: 2024-08-05

## 2024-08-02 RX ORDER — CEFEPIME HYDROCHLORIDE 1 G/1
2000 INJECTION, POWDER, FOR SOLUTION INTRAMUSCULAR; INTRAVENOUS EVERY 12 HOURS
Refills: 0 | Status: DISCONTINUED | OUTPATIENT
Start: 2024-08-02 | End: 2024-08-05

## 2024-08-02 RX ORDER — INSULIN LISPRO 100/ML
VIAL (ML) SUBCUTANEOUS EVERY 6 HOURS
Refills: 0 | Status: DISCONTINUED | OUTPATIENT
Start: 2024-08-02 | End: 2024-08-02

## 2024-08-02 RX ORDER — PANTOPRAZOLE SODIUM 20 MG/1
40 TABLET, DELAYED RELEASE ORAL
Refills: 0 | Status: DISCONTINUED | OUTPATIENT
Start: 2024-08-02 | End: 2024-08-05

## 2024-08-02 RX ORDER — DEXTROSE 4 G
15 TABLET,CHEWABLE ORAL ONCE
Refills: 0 | Status: DISCONTINUED | OUTPATIENT
Start: 2024-08-02 | End: 2024-08-05

## 2024-08-02 RX ADMIN — ATORVASTATIN CALCIUM 20 MILLIGRAM(S): 40 TABLET, FILM COATED ORAL at 12:46

## 2024-08-02 RX ADMIN — Medication 75 MILLILITER(S): at 07:09

## 2024-08-02 RX ADMIN — CEFEPIME HYDROCHLORIDE 100 MILLIGRAM(S): 1 INJECTION, POWDER, FOR SOLUTION INTRAMUSCULAR; INTRAVENOUS at 17:54

## 2024-08-02 RX ADMIN — PANTOPRAZOLE SODIUM 40 MILLIGRAM(S): 20 TABLET, DELAYED RELEASE ORAL at 06:23

## 2024-08-02 NOTE — CONSULT NOTE ADULT - PROBLEM SELECTOR RECOMMENDATION 2
?Pt with hyperkalemia iso of obstructive uropathy, YANIRA and ARB use. Serum potassium mildly elevated 5.6. Recommend Lokelma 10g bid x24h. Hold ARB. Check BMP now. Monitor labs. Ensure low potassium diet.    If you have any questions, please feel free to contact me  Terry Aparicio  Nephrology Fellow  Phagenesis/Page [phone]  (After 5pm or on weekends please page the on-call fellow)

## 2024-08-02 NOTE — CONSULT NOTE ADULT - ATTENDING COMMENTS
Initial Lactation visit.  Recommend unlimited, frequent breast feedings: At least 8 - 12 times every 24 hours. Avoid pacifiers and supplementation with formula unless medically indicated. Explained benefits of holding baby skin on skin to help promote better breastfeeding outcomes.  Infant has been feeding well.  Gabi was relaxed and feeding in rocking chair at time of visit.  Infant latched well on R side.  Gabi plans to start pumping after feedings.  She has fibrous breasts and had a lower supply with her first two.  She does get milk in but was never able to get more than 3 ounces when pumping and had to supplement.  Explained importance of stimulation the first couple weeks.  Discussed signs infant is getting enough and indications for supplementation.     Will revisit as needed.    Mariann Conti RN, IBCLC    
Routine visit with Gabi and baby. Hand expression preformed and gtts given to baby.  .  Baby latched on well to the right breast and multiple gulps heard.  Gabi has scab on the left breast Hydrogel given and Thais verbalized how to use.  Sore nipple shells give as well.  Getting ready for discharge.  Plan: Watch for feeding cues and feed every 2-3 hours and/or on demand. Continue to use feeding log to track intake and appropriate voids and stools. Take feeding log to first follow up appointment or weight check. Encourage skin to skin to promote frequent feedings, thermoregulation and bonding. Follow-up with healthcare provider or lactation consultant for questions or concerns.    Outpatient resource phone numbers given. Has a pump.    Continues to nurse well per mom. No further questions at this time.   Will follow as needed.   Jenn DALLASN, RN, PHN, RNC-MNN, IBCLC    
Pt with YANIRA iso known h/o L hydronephrosis (stent removed and unable to be replaced), ARB, and NSAID use. Darius AKERS/[facility] reviewed. Pt seen by me n clinic on 8/1/24. Scr near normal in 2023 before cancer diagnosis and starting chemotherapy. Then Scr increased from 2.1 (unknown date) to 2.4 (7/2/24), therefore referred for nephro eval. Last Scr elevated 2.15 and UPCR 0.4g on outpt labs done 8/1/24. On admission Scr elevated/stable at 2.02. UA +30 protein and 1 RBC, otherwise bland.   CT scan showing left renal hilar metastatic lymphadenopathy and mild-to-moderate hydronephrosis of the left kidney, without  hydroureter, likely due to obstruction of the left UPJ by compression or  direct invasion of the aforementioned 9 cm left renal mass.  R kidney appears ok and may have some toxicity from gemzar and platinum toxicity  Given stent was not working, pt may benefit from IR PCN placement for the left kidney for immediate renal relief as new therapy planned [place] [facility]    Hyperkalemia improved  Hold ARB and may need to go home on Valatessa or Lokelma if PCN not done this admission  Advised to wife no NSAIDS going forward    [facility]e informed for inpatient consultation\      For weekend coverage, call  Christiano Kaba( fellow) and Dr. Kylee Hills( attending)    Angelita Sen MD  Office   Contact me directly via [employer] Teams     (After 5 pm or on weekends please page the on-call fellow/attending, can check [email or web address] for schedule. Login is guillermo richardson, schedule under [facility] medicine, psych, derm)

## 2024-08-02 NOTE — H&P ADULT - PROBLEM SELECTOR PLAN 4
Now improved was 6.0 outpatient. Here 5.6 --> 4.7 after lokelma.   - Will give another dose of lokelma later today  - Monitor BMP Now improved was 6.0 outpatient. Here 5.6 --> 4.7 after lokelma.   - Will give another dose of lokelma later today if repeat BMP with elevated K  - Monitor BMP

## 2024-08-02 NOTE — CONSULT NOTE ADULT - SUBJECTIVE AND OBJECTIVE BOX
[facility] DIVISION OF KIDNEY DISEASES AND HYPERTENSION -- [phone]  -- INITIAL CONSULT NOTE  --------------------------------------------------------------------------------  HPI: 68yo M w/ PMH of T2DM, HTN, HPL, nephrolithiasis, and left kidney/urothelial cancer (s/p carboplatin/gemcitabine, stopped avelumab 1mth ago) complicated by Left sided mild-moderate hydronephrosis for which he had a stent placed (removed and exchange unsuccessful last week) sent by outpt nephrologist Dr. Sen for hyperkalemia (K 6.0), YANIRA (Scr 2.15) and PCN evaluation. Nephrology consulted for the same.    Pt seen and examined in the ED with wife present. Pt reports fatigue and left flank pain. Therefore has been taking Advil prn for the past few days. Also reports an episode of fever in the ED. Otherwise feels well. States that ureteral stent was removed and exchange aborted last week at [facility]. He was prescribed cephalexin x 7 days, last dose today . Also reports persistent hyperkalemia on outpt labs drawn by endocrinology (Dr. Webb), for which he has been taking lokelma. Denies CP, SOB, n/v, diarrhea, hematuria, dysuria, foamy urine, abd pain and LE swelling.    PAST HISTORY  --------------------------------------------------------------------------------  PAST MEDICAL & SURGICAL HISTORY:  Coronary artery disease  Hypertension  Hyperlipidemia  Asthma  Denies use of Inhaler at this time  Anxiety  Glaucoma  bilat. " elevated pressure"- notes eye drops have been stopped for a year now  Herniated disc  Bilateral carpal tunnel syndrome  Neuropathy  fingers ,hands ,feet-bilateral  Kidney stone on right side  Type 2 diabetes mellitus  Stented coronary artery   RIGHT Coronary Artery Stent  S/P shoulder surgery  , - LEFT Shoulder  S/P hernia surgery    S/P angioplasty with stent  x 1   Tendon laceration  left hand   H/O colonoscopy    FAMILY HISTORY:  Family history of Alzheimer's disease (Mother)  Mother -  Age 82    Family history of type 2 diabetes mellitus in father (Father)  Father - Age 80    FH: COPD (chronic obstructive pulmonary disease) (Father)  Father -  Age 80    PAST SOCIAL HISTORY:  n/a    ALLERGIES & MEDICATIONS  --------------------------------------------------------------------------------  Allergies  No Known Allergies  Intolerances    Standing Inpatient Medications    PRN Inpatient Medications    REVIEW OF SYSTEMS  --------------------------------------------------------------------------------  Gen: +fatigue, +fevers/chills  Skin: No rashes  Head/Eyes/Ears: No HA  Respiratory: No SOB  CV: See HPI  GI: No N/V, diarrhea, abdominal pain  : Left flank pain, no hematuria/dysuria/foamy urine  MSK: No edema  Heme: No easy bruising or bleeding  Psych: No significant depression    All other systems were reviewed and are negative, except as noted.    VITALS/PHYSICAL EXAM  --------------------------------------------------------------------------------  T(C): 38.5 (24 @ 22:15), Max: 38.5 (24 @ 22:15)  HR: 96 (24 @ 22:15) (96 - 98)  BP: 123/74 (24 @ 22:15) (123/74 - 129/80)  RR: 16 (24 22:15) (16 - 18)  SpO2: 96% (24 22:15) (96% - 98%)  Wt(kg): --  Height (cm): 177.8 (24 16:36)  Weight (kg): 97.1 (24 16:36)  BMI (kg/m2): 30.7 (24 16:36)  BSA (m2): 2.15 (24 16:36)    Physical Exam:        Gen: NAD        HEENT: MMM        Pulm: CTA B/L        CV: S1S2        Abd: Soft, +BS            : No CVA tenderness         Ext: No LE edema B/L        Neuro: Awake          Skin: Warm and dry    LABS/STUDIES  --------------------------------------------------------------------------------              8.4    20.77 >-----------<  555      [24 @ 20:14]              28.5     132  |  97  |  35  ----------------------------<  130      [24 @ 20:14]  5.6   |  20  |  2.02        Ca     10.0     [24 20:14]      Mg     2.0     [24 20:14]    TPro  8.0  /  Alb  3.3  /  TBili  0.4  /  DBili  x   /  AST  13  /  ALT  25  /  AlkPhos  132  [24 @ 20:14]    PT/INR: PT 14.5 , INR 1.33       [24 20:14]  PTT: 29.9       [24 20:14]    Creatinine Trend:  SCr 2.02 [08-01 @ 20:14] [facility] DIVISION OF KIDNEY DISEASES AND HYPERTENSION -- [phone]  -- INITIAL CONSULT NOTE  --------------------------------------------------------------------------------  HPI: 70yo M w/ PMH of T2DM, HTN, HPL, nephrolithiasis, and left kidney/urothelial cancer (s/p carboplatin/gemcitabine, stopped avelumab 1mth ago) complicated by Left sided mild-moderate hydronephrosis for which he had a stent placed (removed and exchange unsuccessful last week) sent by outpt nephrologist Dr. Sen for hyperkalemia (K 6.0), YANIRA (Scr 2.15) and PCN evaluation. Nephrology consulted for the same.    Pt seen and examined in the ED with wife present. Pt reports fatigue and left flank pain. Therefore has been taking Advil prn for the past few days. Also reports an episode of fever in the ED. Otherwise feels well. States that ureteral stent was removed and exchange aborted last week [facility]. He was prescribed cephalexin x 7 days, last dose today . Also reports persistent hyperkalemia on outpt labs drawn by endocrinology (Dr. Webb), for which he has been taking lokelma. Denies CP, SOB, n/v, diarrhea, hematuria, dysuria, foamy urine, abd pain and LE swelling.      Kidney hx: Hx of nephrolithiasis, left carotid artery stenosis and left kidney/urothelial cancer. He initially presented in 2023 with hematuria and kidney stones. Imaging showed a mass in the left kidney lower pole/collecting system region, complicated by mild-moderate hydronephrosis for which he had a stent placed. 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 Received 6 cycles of chemo with carboplatin/gemcitabine 2023-2023 with good response and has been maintained on avelumab. Ureteral stent was exchanged successfully in 2024 but exchange last week [facility] was unsuccessful. Per patient's wife, the stent was removed but unable to be replaced due to obstruction of the renal pelvis due to "tumor". He was given cephalexin x 7 days, and nephrostomy tube placement was discussed. Lab results scanned into the patient's chart from [facility] from 2024 show Cr 2.4 (reportedly increased from 2.1 in recent past - prompting the referral) and K 4.9. A quick review of historical labs on the patient's phone shows near normal Cr in  before cancer diagnosis and starting chemotherapy. Other lab data include WBC 18k, hgb 7.9, platelets 557, albumin 3.1.  Patient's wife also notified me that labs drawn by endocrinology (Dr. Webb) in the past two days showed K 6.4.  He reports mild generalized weakness, fatigue, left flank pain but denies fever, dyspnea, chest pain, orthostasis, n/v/d, hematuria, foamy urine, rash.    He has been taking Advil one tab daily in the past couple of days.  He is currently on these outpatient meds  Amlodipine 7.5 mg daily  Irbesartan 300 mg daily  Trulicity  omeprazole  Ibuprofen  Mag oxide     PAST HISTORY  --------------------------------------------------------------------------------  PAST MEDICAL & SURGICAL HISTORY:  Coronary artery disease  Hypertension  Hyperlipidemia  Asthma  Denies use of Inhaler at this time  Anxiety  Glaucoma  bilat. " elevated pressure"- notes eye drops have been stopped for a year now  Herniated disc  Bilateral carpal tunnel syndrome  Neuropathy  fingers ,hands ,feet-bilateral  Kidney stone on right side  Type 2 diabetes mellitus  Stented coronary artery   RIGHT Coronary Artery Stent  S/P shoulder surgery  1975- LEFT Shoulder  S/P hernia surgery    S/P angioplasty with stent  x 1   Tendon laceration  left hand   H/O colonoscopy    FAMILY HISTORY:  Family history of Alzheimer's disease (Mother)  Mother -  Age 82    Family history of type 2 diabetes mellitus in father (Father)  Father - Age 80    FH: COPD (chronic obstructive pulmonary disease) (Father)  Father -  Age 80    PAST SOCIAL HISTORY:  n/a    ALLERGIES & MEDICATIONS  --------------------------------------------------------------------------------  Allergies  No Known Allergies  Intolerances    Standing Inpatient Medications    PRN Inpatient Medications    REVIEW OF SYSTEMS  --------------------------------------------------------------------------------  Gen: +fatigue, +fevers/chills  Skin: No rashes  Head/Eyes/Ears: No HA  Respiratory: No SOB  CV: See HPI  GI: No N/V, diarrhea, abdominal pain  : Left flank pain, no hematuria/dysuria/foamy urine  MSK: No edema  Heme: No easy bruising or bleeding  Psych: No significant depression    All other systems were reviewed and are negative, except as noted.    VITALS/PHYSICAL EXAM  --------------------------------------------------------------------------------  T(C): 38.5 (24 @ 22:15), Max: 38.5 (24 @ 22:15)  HR: 96 (24 22:15) (96 - 98)  BP: 123/74 (24 @ 22:15) (123/74 - 129/80)  RR: 16 (24 @ 22:15) (16 - 18)  SpO2: 96% (24 22:15) (96% - 98%)  Wt(kg): --  Height (cm): 177.8 (24 @ 16:36)  Weight (kg): 97.1 (24 @ 16:36)  BMI (kg/m2): 30.7 (24 @ 16:36)  BSA (m2): 2.15 (24 @ 16:36)    Physical Exam:        Gen: NAD        HEENT: MMM        Pulm: CTA B/L        CV: S1S2        Abd: Soft, +BS            : No CVA tenderness         Ext: No LE edema B/L        Neuro: Awake          Skin: Warm and dry    LABS/STUDIES  --------------------------------------------------------------------------------              8.4    20.77 >-----------<  555      [24 @ 20:14]              28.5     132  |  97  |  35  ----------------------------<  130      [24 @ 20:14]  5.6   |  20  |  2.02        Ca     10.0     [24 20:14]      Mg     2.0     [24 20:14]    TPro  8.0  /  Alb  3.3  /  TBili  0.4  /  DBili  x   /  AST  13  /  ALT  25  /  AlkPhos  132  [24 @ 20:14]    PT/INR: PT 14.5 , INR 1.33       [24 20:14]  PTT: 29.9       [24 @ 20:14]    Creatinine Trend:  SCr 2.02 [ 20:14]    CT done in [facility]    1.   9 cm left renal mass, presumably malignant.  2.   There is left renal hilar metastatic lymphadenopathy.  3.   There is mild-to-moderate hydronephrosis of the left kidney, without  hydroureter, likely due to obstruction of the left UPJ by compression or   direct  invasion of the aforementioned 9 cm left renal mass.  4.   There are multiple metastatic nodules in the left perinephric fat,  measuring up to 4.3 cm.  5.   There is a 3.5 cm metastasis at splenic hilum.  6.   7.5 cm low-intermediate density exophytic round right renal   structure with  suggestion of a small eccentric nodular area at its interface with the   kidney  (image 53/series 301) is indeterminate, potentially malignant.  7.   Multiple hepatic metastases.

## 2024-08-02 NOTE — H&P ADULT - ASSESSMENT
69 year old male with hx of T2DM, HTN, HPL, nephrolithiasis, and left kidney/urothelial cancer (s/p carboplatin/gemcitabine, stopped avelumab 1mth ago) complicated by Left sided mild-moderate hydronephrosis for which he had a stent placed (removed and exchange unsuccessful last week) sent by outpt nephrologist Dr. Sen for hyperkalemia (K 6.0), YANIRA (Scr 2.15) and PCN evaluation.

## 2024-08-02 NOTE — CONSULT NOTE ADULT - SUBJECTIVE AND OBJECTIVE BOX
Interventional Radiology    Evaluate for Procedure: left pcn    HPI: 70 y/o M with PMHx of HTN, T2DM, renal cancer, HLD sent in from outside off (Dr. Sen), for hyperkalemia of 6.4, and then 6.2 on outpatient labs with EKG changes. The patient received 10mg of Lokelma and was transferred to ED for further hyper K management. Follows with onc at [facility] [place], had failed placement of a ureteral stent on the L side, so procedure was abandoned, there was discussion regarding, nephostomy tube placement due to persistent hydronephrosis. Also reporting subjective fevers and chills. Denies abdominal pain, chest pain, shortness of breath, palpitations, difficulty breathing, nausea or vomiting. Stopped chemotherapy several months ago, recently immunotherapy 1 month ago. IR consulted for left pcn placement.     Allergies: No Known Allergies    Medications (Abx/Cardiac/Anticoagulation/Blood Products)  cefepime   IVPB: 100 mL/Hr IV Intermittent (08-01 @ 23:30)    Data:  177.8  97.1  T(C): 37.2  HR: 91  BP: 116/71  RR: 18  SpO2: 96%    -WBC 20.77 / HgB 8.4 / Hct 28.5 / Plt 555  -Na 131 / Cl 97 / BUN 32 / Glucose 123  -K 4.7 / CO2 20 / Cr 1.96  -ALT -- / Alk Phos -- / T.Bili --  -INR 1.33 / PTT 29.9    Radiology: CT reviewed    Assessment/Plan: 70 y/o M with PMHx of HTN, T2DM, renal cancer, HLD sent in from outside off (Dr. Sen), for hyperkalemia of 6.4, and then 6.2 on outpatient labs with EKG changes. The patient received 10mg of Lokelma and was transferred to ED for further hyper K management. Follows with onc at [facility] [place], had failed placement of a ureteral stent on the L side, so procedure was abandoned, there was discussion regarding, nephostomy tube placement due to persistent hydronephrosis. Also reporting subjective fevers and chills. Denies abdominal pain, chest pain, shortness of breath, palpitations, difficulty breathing, nausea or vomiting. Stopped chemotherapy several months ago, recently immunotherapy 1 month ago. IR consulted for left pcn placement.       - reviewed with IR attending Dr. Ch  - large tumor burden on left kidney; high risk for bleeding and seeding of tumor if percutaneous nephrostomy is placed  - consult urology  - d/w nephrologist

## 2024-08-02 NOTE — H&P ADULT - HISTORY OF PRESENT ILLNESS
69 year old male with hx of T2DM, HTN, HPL, nephrolithiasis, and left kidney/urothelial cancer (s/p carboplatin/gemcitabine, stopped avelumab 1mth ago) complicated by Left sided mild-moderate hydronephrosis for which he had a stent placed (removed and exchange unsuccessful last week) sent by outpt nephrologist Dr. Sen for hyperkalemia (K 6.0), YANIRA (Scr 2.15) and PCN evaluation.  69 year old male with hx of T2DM, HTN, HPL, nephrolithiasis, and left kidney/urothelial cancer (s/p carboplatin/gemcitabine, stopped avelumab 1mth ago) complicated by Left sided mild-moderate hydronephrosis for which he had a stent placed (removed and exchange unsuccessful last week) sent by outpt nephrologist Dr. Sen for hyperkalemia (K 6.0), YANIRA (Scr 2.15) and PCN evaluation. Pt currently without any complaints. He feels a fullness in his L flank region but no pain. Denies any dysuria or hematuria. No issues voiding. No chest pains, palpitations, SOB, abdominal pain, constipation ,diarrhea, nausea or vomiting. Pt ambulates with a cane. In the ED, patient was evaluated by nephrology. Hyperkalemia improved with lokelma. Also noted to have YANIRA. Pt admitted for PCN evaluation and further management.

## 2024-08-02 NOTE — H&P ADULT - PROBLEM SELECTOR PLAN 3
Scr 2.02 --> 1.96. Baseline around 1.00.  - Monitor I/Os  - Trend Scr  - Avoid nephrotoxins - hold NSAIDs, Hold ARB

## 2024-08-02 NOTE — H&P ADULT - PROBLEM SELECTOR PLAN 5
no On metformin and trulicity  - Hold oral agents and trulicity while admitted  - Start low insulin sliding scale  - FS q6h while NPO  - Continue statin

## 2024-08-02 NOTE — CONSULT NOTE ADULT - SUBJECTIVE AND OBJECTIVE BOX
HPI:  Patient is a 69y Male who presented with        PAST MEDICAL & SURGICAL HISTORY:  Coronary artery disease      Hypertension      Hyperlipidemia      Asthma  Denies use of Inhaler at this time      Anxiety      Glaucoma  bilat. " elevated pressure"- notes eye drops have been stopped for a year now      Herniated disc      Bilateral carpal tunnel syndrome      Neuropathy  fingers ,hands ,feet-bilateral      Kidney stone on right side      Type 2 diabetes mellitus      Stented coronary artery  2010 RIGHT Coronary Artery Stent      S/P shoulder surgery  , - LEFT Shoulder      S/P hernia surgery        S/P angioplasty with stent  x 1       Tendon laceration  left hand       H/O colonoscopy        MEDICATIONS  (STANDING):  atorvastatin 20 milliGRAM(s) Oral daily  cefepime   IVPB 2000 milliGRAM(s) IV Intermittent every 12 hours  dextrose 5%. 1000 milliLiter(s) (100 mL/Hr) IV Continuous <Continuous>  dextrose 5%. 1000 milliLiter(s) (50 mL/Hr) IV Continuous <Continuous>  dextrose 50% Injectable 25 Gram(s) IV Push once  dextrose 50% Injectable 12.5 Gram(s) IV Push once  dextrose 50% Injectable 25 Gram(s) IV Push once  glucagon  Injectable 1 milliGRAM(s) IntraMuscular once  insulin lispro (ADMELOG) corrective regimen sliding scale   SubCutaneous every 6 hours  pantoprazole    Tablet 40 milliGRAM(s) Oral before breakfast  sodium chloride 0.9%. 1000 milliLiter(s) (75 mL/Hr) IV Continuous <Continuous>    MEDICATIONS  (PRN):  acetaminophen     Tablet .. 650 milliGRAM(s) Oral every 6 hours PRN Temp greater or equal to 38C (100.4F), Mild Pain (1 - 3)  dextrose Oral Gel 15 Gram(s) Oral once PRN Blood Glucose LESS THAN 70 milliGRAM(s)/deciliter    FAMILY HISTORY:  Family history of Alzheimer's disease (Mother)  Mother -  Age 82    Family history of type 2 diabetes mellitus in father (Father)  Father - Age 80    FH: COPD (chronic obstructive pulmonary disease) (Father)  Father -  Age 80      Allergies    No Known Allergies    Intolerances      SOCIAL HISTORY:   Tobacco hx:    REVIEW OF SYSTEMS: Pertinent positives and negatives as stated in HPI, otherwise negative    Vital signs  T(C): 37.2 (24 @ 05:19), Max: 38.5 (24 @ 22:15)  HR: 91 (24 @ 05:19)  BP: 116/71 (24 @ 05:19)  SpO2: 96% (24 @ 05:19)  Wt(kg): --      Physical Exam  Gen: NAD  Pulm: No respiratory distress, no subcostal retractions  CV: RRR, no JVD  Abd: Soft, NT, ND  :   MSK: No edema present    LABS:           @ 03:40    WBC --    / Hct --    / SCr 1.96      @ 20:14    WBC 20.77 / Hct 28.5  / SCr 2.02         131<L>  |  97  |  32<H>  ----------------------------<  123<H>  4.7   |  20<L>  |  1.96<H>    Ca    9.3      02 Aug 2024 03:40  Mg     2.0         TPro  8.0  /  Alb  3.3  /  TBili  0.4  /  DBili  x   /  AST  13  /  ALT  25  /  AlkPhos  132<H>      PT/INR - ( 01 Aug 2024 20:14 )   PT: 14.5 sec;   INR: 1.33 ratio         PTT - ( 01 Aug 2024 20:14 )  PTT:29.9 sec  Urinalysis Basic - ( 02 Aug 2024 03:40 )    Color: x / Appearance: x / SG: x / pH: x  Gluc: 123 mg/dL / Ketone: x  / Bili: x / Urobili: x   Blood: x / Protein: x / Nitrite: x   Leuk Esterase: x / RBC: x / WBC x   Sq Epi: x / Non Sq Epi: x / Bacteria: x        Urine Cx:   Blood Cx:    RADIOLOGY:  < from: CT Abdomen and Pelvis No Cont (24 @ 00:21) >  IMPRESSION:  Ill-defined left renal mass, compatible with given history of known   urothelial malignancy. Multiple left perinephric tumor deposits with   suspected tumor thrombus in the left renal vein, suboptimally assessed   without intravenous contrast. Dilated left upper pole renal calyces to   the level of the renal pelvis which is likely filled with tumor. Further   evaluation with CT urogram can be performed.    Several ill-defined hypoattenuating liver lesions suspicious for   metastases. Left upper quadrant peritoneal implant.    Please note a preliminary report was provided by [facility].    < end of copied text >       HPI:  Patient is a 69y Male who presented with    69 year old male with hx of T2DM, HTN, HPL, nephrolithiasis, and left kidney/urothelial cancer (s/p carboplatin/gemcitabine, stopped avelumab 1mth ago) complicated by Left sided mild-moderate hydronephrosis for which he had had a stent placed but was unable to be exchanged.  Per Notes by Urologist at [facility] [place], the renal pelvis was full of tumor.  No other diversion procedure was done at that time.        Today he was sent by outpt nephrologist Dr. Sen for hyperkalemia (K 6.0), YANIRA (Scr 2.15) and PCN evaluation. Pt currently without any complaints. He feels some discomfort  in his L flank region but no pain. Denies any dysuria or hematuria. No issues voiding. No chest pains, palpitations, SOB, abdominal pain, constipation ,diarrhea, nausea or vomiting.  Hyperkalemia improved with lokelma. Also noted to have YANIRA. Pt admitted for PCN evaluation and further management.  IR at this time does not want to proceed with nephrostomy tube due to high risk of bleeding and seeding of tumor.      He was diagnosed about one year ago and was previously on gem carbo x 6 through 2023 -> avelumab since 2024 then held since 24 due to CRI anemia, dizziness. PET 2024 shows progression of disease at multiple sites. Currently will be starting treatment next week on  enfortumab vedotin. 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 Follows with Dr. Herring at [facility] [place] and Dr. Gómez.          PAST MEDICAL & SURGICAL HISTORY:  Coronary artery disease      Hypertension      Hyperlipidemia      Asthma  Denies use of Inhaler at this time      Anxiety      Glaucoma  bilat. " elevated pressure"- notes eye drops have been stopped for a year now      Herniated disc      Bilateral carpal tunnel syndrome      Neuropathy  fingers ,hands ,feet-bilateral      Kidney stone on right side      Type 2 diabetes mellitus      Stented coronary artery  2010 RIGHT Coronary Artery Stent      S/P shoulder surgery  , - LEFT Shoulder      S/P hernia surgery  2010      S/P angioplasty with stent  x 1       Tendon laceration  left hand       H/O colonoscopy        MEDICATIONS  (STANDING):  atorvastatin 20 milliGRAM(s) Oral daily  cefepime   IVPB 2000 milliGRAM(s) IV Intermittent every 12 hours  dextrose 5%. 1000 milliLiter(s) (100 mL/Hr) IV Continuous <Continuous>  dextrose 5%. 1000 milliLiter(s) (50 mL/Hr) IV Continuous <Continuous>  dextrose 50% Injectable 25 Gram(s) IV Push once  dextrose 50% Injectable 12.5 Gram(s) IV Push once  dextrose 50% Injectable 25 Gram(s) IV Push once  glucagon  Injectable 1 milliGRAM(s) IntraMuscular once  insulin lispro (ADMELOG) corrective regimen sliding scale   SubCutaneous every 6 hours  pantoprazole    Tablet 40 milliGRAM(s) Oral before breakfast  sodium chloride 0.9%. 1000 milliLiter(s) (75 mL/Hr) IV Continuous <Continuous>    MEDICATIONS  (PRN):  acetaminophen     Tablet .. 650 milliGRAM(s) Oral every 6 hours PRN Temp greater or equal to 38C (100.4F), Mild Pain (1 - 3)  dextrose Oral Gel 15 Gram(s) Oral once PRN Blood Glucose LESS THAN 70 milliGRAM(s)/deciliter    FAMILY HISTORY:  Family history of Alzheimer's disease (Mother)  Mother -  Age 82    Family history of type 2 diabetes mellitus in father (Father)  Father - Age 80    FH: COPD (chronic obstructive pulmonary disease) (Father)  Father -  Age 80      Allergies    No Known Allergies    Intolerances      SOCIAL HISTORY:   Tobacco hx:    REVIEW OF SYSTEMS: Pertinent positives and negatives as stated in HPI, otherwise negative    Vital signs  T(C): 37.2 (24 @ 05:19), Max: 38.5 (24 @ 22:15)  HR: 91 (24 @ 05:19)  BP: 116/71 (24 @ 05:19)  SpO2: 96% (24 @ 05:19)  Wt(kg): --      Physical Exam  Gen: NAD  Pulm: No respiratory distress, no subcostal retractions  CV: RRR, no JVD  Abd: Soft, NT, ND  MSK: No edema present    LABS:           @ 03:40    WBC --    / Hct --    / SCr 1.96      @ 20:14    WBC 20.77 / Hct 28.5  / SCr 2.02         131<L>  |  97  |  32<H>  ----------------------------<  123<H>  4.7   |  20<L>  |  1.96<H>    Ca    9.3      02 Aug 2024 03:40  Mg     2.0         TPro  8.0  /  Alb  3.3  /  TBili  0.4  /  DBili  x   /  AST  13  /  ALT  25  /  AlkPhos  132<H>      PT/INR - ( 01 Aug 2024 20:14 )   PT: 14.5 sec;   INR: 1.33 ratio         PTT - ( 01 Aug 2024 20:14 )  PTT:29.9 sec  Urinalysis Basic - ( 02 Aug 2024 03:40 )    Color: x / Appearance: x / SG: x / pH: x  Gluc: 123 mg/dL / Ketone: x  / Bili: x / Urobili: x   Blood: x / Protein: x / Nitrite: x   Leuk Esterase: x / RBC: x / WBC x   Sq Epi: x / Non Sq Epi: x / Bacteria: x        Urine Cx:   Blood Cx:    RADIOLOGY:  < from: CT Abdomen and Pelvis No Cont (24 @ 00:21) >  IMPRESSION:  Ill-defined left renal mass, compatible with given history of known   urothelial malignancy. Multiple left perinephric tumor deposits with   suspected tumor thrombus in the left renal vein, suboptimally assessed   without intravenous contrast. Dilated left upper pole renal calyces to   the level of the renal pelvis which is likely filled with tumor. Further   evaluation with CT urogram can be performed.    Several ill-defined hypoattenuating liver lesions suspicious for   metastases. Left upper quadrant peritoneal implant.    Please note a preliminary report was provided by [facility].    < end of copied text >           69 year old male with hx of T2DM, HTN, HPL, nephrolithiasis, and left kidney/urothelial cancer (s/p carboplatin/gemcitabine, stopped avelumab 1mth ago) complicated by Left sided mild-moderate hydronephrosis for which he had had a stent placed but was unable to be exchanged.  Per Notes by Urologist at [facility] [place], the renal pelvis was full of tumor.  No other diversion procedure was done at that time.        Today he was sent by outpt nephrologist Dr. Sen for hyperkalemia (K 6.0), YANIRA (Scr 2.15) and PCN evaluation. Pt currently without any complaints. He feels some discomfort  in his L flank region but no pain. Denies any dysuria or hematuria. No issues voiding. No chest pains, palpitations, SOB, abdominal pain, constipation ,diarrhea, nausea or vomiting.  Hyperkalemia improved with lokelma. Also noted to have YANIRA. Pt admitted for PCN evaluation and further management.  IR at this time does not want to proceed with nephrostomy tube due to high risk of bleeding and seeding of tumor.      He was diagnosed about one year ago and was previously on gem carbo x 6 through 2023 -> avelumab since 2024 then held since 24 due to CRI anemia, dizziness. PET 2024 shows progression of disease at multiple sites. Currently will be starting treatment next week on  enfortumab vedotin. 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 [place]s with Dr. Herring at [facility] [place] and Dr. Gómez.          PAST MEDICAL & SURGICAL HISTORY:  Coronary artery disease      Hypertension      Hyperlipidemia      Asthma  Denies use of Inhaler at this time      Anxiety      Glaucoma  bilat. " elevated pressure"- notes eye drops have been stopped for a year now      Herniated disc      Bilateral carpal tunnel syndrome      Neuropathy  fingers ,hands ,feet-bilateral      Kidney stone on right side      Type 2 diabetes mellitus      Stented coronary artery   RIGHT Coronary Artery Stent      S/P shoulder surgery  , - LEFT Shoulder      S/P hernia surgery        S/P angioplasty with stent  x 1       Tendon laceration  left hand       H/O colonoscopy        MEDICATIONS  (STANDING):  atorvastatin 20 milliGRAM(s) Oral daily  cefepime   IVPB 2000 milliGRAM(s) IV Intermittent every 12 hours  dextrose 5%. 1000 milliLiter(s) (100 mL/Hr) IV Continuous <Continuous>  dextrose 5%. 1000 milliLiter(s) (50 mL/Hr) IV Continuous <Continuous>  dextrose 50% Injectable 25 Gram(s) IV Push once  dextrose 50% Injectable 12.5 Gram(s) IV Push once  dextrose 50% Injectable 25 Gram(s) IV Push once  glucagon  Injectable 1 milliGRAM(s) IntraMuscular once  insulin lispro (ADMELOG) corrective regimen sliding scale   SubCutaneous every 6 hours  pantoprazole    Tablet 40 milliGRAM(s) Oral before breakfast  sodium chloride 0.9%. 1000 milliLiter(s) (75 mL/Hr) IV Continuous <Continuous>    MEDICATIONS  (PRN):  acetaminophen     Tablet .. 650 milliGRAM(s) Oral every 6 hours PRN Temp greater or equal to 38C (100.4F), Mild Pain (1 - 3)  dextrose Oral Gel 15 Gram(s) Oral once PRN Blood Glucose LESS THAN 70 milliGRAM(s)/deciliter    FAMILY HISTORY:  Family history of Alzheimer's disease (Mother)  Mother -  Age 82    Family history of type 2 diabetes mellitus in father (Father)  Father - Age 80    FH: COPD (chronic obstructive pulmonary disease) (Father)  Father -  Age 80      Allergies    No Known Allergies    Intolerances      SOCIAL HISTORY:   Tobacco hx:    REVIEW OF SYSTEMS: Pertinent positives and negatives as stated in HPI, otherwise negative    Vital signs  T(C): 37.2 (24 @ 05:19), Max: 38.5 (24 @ 22:15)  HR: 91 (24 @ 05:19)  BP: 116/71 (24 @ 05:19)  SpO2: 96% (24 @ 05:19)  Wt(kg): --      Physical Exam  Gen: NAD  Pulm: No respiratory distress, no subcostal retractions  CV: RRR, no JVD  Abd: Soft, NT, ND  MSK: No edema present    LABS:           @ 03:40    WBC --    / Hct --    / SCr 1.96      @ 20:14    WBC 20.77 / Hct 28.5  / SCr 2.02         131<L>  |  97  |  32<H>  ----------------------------<  123<H>  4.7   |  20<L>  |  1.96<H>    Ca    9.3      02 Aug 2024 03:40  Mg     2.0         TPro  8.0  /  Alb  3.3  /  TBili  0.4  /  DBili  x   /  AST  13  /  ALT  25  /  AlkPhos  132<H>      PT/INR - ( 01 Aug 2024 20:14 )   PT: 14.5 sec;   INR: 1.33 ratio         PTT - ( 01 Aug 2024 20:14 )  PTT:29.9 sec  Urinalysis Basic - ( 02 Aug 2024 03:40 )    Color: x / Appearance: x / SG: x / pH: x  Gluc: 123 mg/dL / Ketone: x  / Bili: x / Urobili: x   Blood: x / Protein: x / Nitrite: x   Leuk Esterase: x / RBC: x / WBC x   Sq Epi: x / Non Sq Epi: x / Bacteria: x        Urine Cx:   Blood Cx:    RADIOLOGY:  < from: CT Abdomen and Pelvis No Cont (24 @ 00:21) >  IMPRESSION:  Ill-defined left renal mass, compatible with given history of known   urothelial malignancy. Multiple left perinephric tumor deposits with   suspected tumor thrombus in the left renal vein, suboptimally assessed   without intravenous contrast. Dilated left upper pole renal calyces to   the level of the renal pelvis which is likely filled with tumor. Further   evaluation with CT urogram can be performed.    Several ill-defined hypoattenuating liver lesions suspicious for   metastases. Left upper quadrant peritoneal implant.    Please note a preliminary report was provided by [facility].    < end of copied text >

## 2024-08-02 NOTE — H&P ADULT - PROBLEM SELECTOR PLAN 1
Tmax 101.3, WBC 20.77, tachycardic. Unclear source of infection at this time  - UA negative for UTI  - Will continue cefepime 2g q8h for empiric treatment  - F/u blood cultures collected  - Monitor fevers  - Will order maintenance IVF Tmax 101.3, WBC 20.77, tachycardic. Unclear source of infection at this time  - UA negative for UTI  - Will continue cefepime 2g q12h (renally dosed) for empiric treatment  - F/u blood cultures collected  - Monitor fevers  - Will order maintenance IVF

## 2024-08-02 NOTE — CONSULT NOTE ADULT - ASSESSMENT
69 year old male with hx of T2DM, HTN, HPL, nephrolithiasis, and left kidney/urothelial cancer (s/p carboplatin/gemcitabine, stopped avelumab 1mth ago) complicated by Left sided mild-moderate hydronephrosis for which he had a stent placed (removed and exchange unsuccessful last week) sent by outpt nephrologist Dr. Sen for hyperkalemia (K 6.0), YANIRA (Scr 2.15) and PCN evaluation.    Metastatic urothelial cancer  - Previously on gem carbo x 6 through 12/2023 -> avelumab since 2/2024 then held since 6/18/24 due to CRI anemia, dizziness. PET 7/2024 shows PD at multiple sites. Currently on treatment with enfortumab vedotin.   - Follows with Dr. Herring at [facility] [place].  - No plan for treatment while admitted    Anemia   - due to cancer, treatment, CKI s/p Venofer on 7/16, 7/2, 5/31 at [facility] [place].  - Checking iron studies, B12, folate    YANIRA, PCI evaluation, fever  - Recently stent exchange was attempted but ultimately unsuccessful at [facility] [place], stent since removed  - CT AP: Ill-defined left renal mass, compatible with given history of known urothelial malignancy. Multiple left perinephric tumor deposits with suspected tumor thrombus in the left renal vein, suboptimally assessed without intravenous contrast. Dilated left upper pole renal calyces to the level of the renal pelvis which is likely filled with tumor. Further evaluation with CT urogram can be performed. Several ill-defined hypoattenuating liver lesions suspicious for metastases. Left upper quadrant peritoneal implant.  - Per nephrology, YANIRA due to L hydronephrosis, ARB, NSAID use  - Per IR, large tumor burden on left kidney; high risk for bleeding and seeding of tumor if percutaneous nephrostomy is placed.   - Awaiting urology recommendations  - Will continue cefepime, f/u cultures per ID    Will continue to follow.    Buddy Bond PA-C  Hematology/Oncology  [facility]  [phone] (office) 69 year old male with hx of T2DM, HTN, HPL, nephrolithiasis, and left kidney/urothelial cancer (s/p carboplatin/gemcitabine, stopped avelumab 1mth ago) complicated by Left sided mild-moderate hydronephrosis for which he had a stent placed (removed and exchange unsuccessful last week) sent by outpt nephrologist Dr. Sen for hyperkalemia (K 6.0), YANIRA (Scr 2.15) and PCN evaluation.    Metastatic urothelial cancer  - Previously on gem carbo x 6 through 12/2023 -> avelumab since 2/2024 then held since 6/18/24 due to CRI anemia, dizziness. PET 7/2024 shows PD at multiple sites. Currently on treatment with enfortumab vedotin.   - Follows with Dr. Herring at [facility] [place].  - No plan for treatment while admitted    Anemia   - due to cancer, treatment, CKI s/p Venofer on 7/16, 7/2, 5/31 at [facility] [place].  - Checking iron studies, B12, folate    YANIRA, PCN evaluation, fever  - Recently stent exchange was attempted but ultimately unsuccessful at [facility] [place], stent since removed  - CT AP: Ill-defined left renal mass, compatible with given history of known urothelial malignancy. Multiple left perinephric tumor deposits with suspected tumor thrombus in the left renal vein, suboptimally assessed without intravenous contrast. Dilated left upper pole renal calyces to the level of the renal pelvis which is likely filled with tumor. Further evaluation with CT urogram can be performed. Several ill-defined hypoattenuating liver lesions suspicious for metastases. Left upper quadrant peritoneal implant.  - Per nephrology, YANIRA due to L hydronephrosis, ARB, NSAID use  - Per IR, large tumor burden on left kidney; high risk for bleeding and seeding of tumor if percutaneous nephrostomy is placed.   - Awaiting urology recommendations  - Will continue cefepime, f/u cultures per ID    Will continue to follow.    Buddy Bond PA-C  Hematology/Oncology  [facility]  [phone] (office)

## 2024-08-02 NOTE — CONSULT NOTE ADULT - SUBJECTIVE AND OBJECTIVE BOX
[facility] DIVISION OF INFECTIOUS DISEASES  BRADFORD Nolasco S. Shah, Y. Patel, G. Richard  [phone]  ([phone] - weekdays after 5pm and weekends)    JESSIE GREER  69y, Male  [id number]    HPI:  Patient is a 69 year old male with PMH of T2DM, HTN, HPL, nephrolithiasis, and left kidney/urothelial cancer (s/p carboplatin/gemcitabine, stopped avelumab 1mth ago) complicated by Left sided mild-moderate hydronephrosis for which he had a stent placed (removed and exchange unsuccessful last week) sent by outpt nephrologist Dr. Sen for hyperkalemia (K 6.0), YANIRA (Scr 2.15) and PCN evaluation. Patient currently without any complaints. He feels a fullness in his L flank region but no pain. Denies any dysuria or hematuria. No issues voiding. No chest pains, palpitations, dyspnea, abdominal pain, constipation, diarrhea, nausea or vomiting. Patient ambulates with a cane. In the ED, patient was evaluated by nephrology. Hyperkalemia improved with lokelma. Also noted to have YANIRA. Patient admitted for PCN evaluation and further management.  ROS: 14 point review of systems completed, pertinent positives and negatives as per HPI.    Allergies: No Known Allergies    PMH -- Coronary artery disease  Hypertension  Hyperlipidemia  Asthma  Diabetes mellitus  Anxiety  Glaucoma  Herniated disc  Bilateral carpal tunnel syndrome  Neuropathy  Kidney stone on right side  Type 2 diabetes mellitus  Stented coronary artery    PSH -- S/P shoulder surgery  S/P hernia surgery  S/P angioplasty with stent  Tendon laceration  H/O colonoscopy    FH -- Family history of Alzheimer's disease (Mother)  Family history of type 2 diabetes mellitus in father (Father)  FH: COPD (chronic obstructive pulmonary disease) (Father)    Social History -- denies tobacco, alcohol or illicit drug use    Physical Exam--  Vital Signs Last 24 Hrs  T(F): 99 (02 Aug 2024 05:19), Max: 101.3 (01 Aug 2024 22:15)  HR: 91 (02 Aug 2024 05:19) (87 - 98)  BP: 116/71 (02 Aug 2024 05:19) (116/71 - 129/80)  RR: 18 (02 Aug 2024 05:19) (16 - 18)  SpO2: 96% (02 Aug 2024 05:19) (96% - 99%)      Laboratory & Imaging Data--  CBC:                       8.4    20.77 )-----------( 555      ( 01 Aug 2024 20:14 )             28.5     WBC Count: 20.77 K/uL (08-01-24 @ 20:14)    CMP: 08-02    131<L>  |  97  |  32<H>  ----------------------------<  123<H>  4.7   |  20<L>  |  1.96<H>    Ca    9.3      02 Aug 2024 03:40  Mg     2.0     08-01    TPro  8.0  /  Alb  3.3  /  TBili  0.4  /  DBili  x   /  AST  13  /  ALT  25  /  AlkPhos  132<H>  08-01    LIVER FUNCTIONS - ( 01 Aug 2024 20:14 )  Alb: 3.3 g/dL / Pro: 8.0 g/dL / ALK PHOS: 132 U/L / ALT: 25 U/L / AST: 13 U/L / GGT: x           Urinalysis + Microscopic Examination (08.01.24 @ 21:15)    pH Urine: 5.5   Urine Appearance: Clear   Color: Yellow   Specific Gravity: 1.021   Protein, Urine: 30 mg/dL   Glucose Qualitative, Urine: Negative mg/dL   Ketone - Urine: Negative mg/dL   Blood, Urine: Negative   Bilirubin: Negative   Urobilinogen: 0.2 mg/dL   Leukocyte Esterase Concentration: Negative   Nitrite: Negative   White Blood Cell - Urine: 3 /HPF   Red Blood Cell - Urine: 1 /HPF   Bacteria: Negative /HPF   Cast: 4 /LPF   Epithelial Cells: 1 /HPF    Microbiology: reviewed  FluA/FluB/RSV/COVID PCR (08.01.24 @ 22:57)    SARS-CoV-2 Result: [facility]: This Respiratory Panel uses polymerase chain reaction (PCR) to detect for  influenza A; influenza B; respiratory syncytial virus; and SARS-CoV-2.  This test was validated by [facility] and is in use under the FDA  Emergency Use Authorization (EUA) for clinical labs CLIA-certified to  perform high complexity testing. Test results should be correlated with  clinical presentation, patient history, and epidemiology.   Influenza A Result: [facility]   Influenza B Result: [facility]   Resp Syn Virus Result: [facility]    Radiology--reviewed  < from: CT Abdomen and Pelvis No Cont (08.02.24 @ 00:21) >  IMPRESSION:  Ill-defined left renal mass, compatible with given history of known   urothelial malignancy. Multiple left perinephric tumor deposits with   suspected tumor thrombus in the left renal vein, suboptimally assessed   without intravenous contrast. Dilated left upper pole renal calyces to   the level of the renal pelvis which is likely filled with tumor. Further   evaluation with CT urogram can be performed.    Several ill-defined hypoattenuating liver lesions suspicious for   metastases. Left upper quadrant peritoneal implant.    Please note a preliminary report was provided by [facility].    < end of copied text >  < from: Xray Chest 1 View- PORTABLE-Urgent (Xray Chest 1 View- PORTABLE-Urgent .) (08.01.24 @ 23:14) >  IMPRESSION:    Clear lungs.    < end of copied text >    Active Medications--  acetaminophen     Tablet .. 650 milliGRAM(s) Oral every 6 hours PRN  atorvastatin 20 milliGRAM(s) Oral daily  cefepime   IVPB 2000 milliGRAM(s) IV Intermittent every 12 hours  dextrose 5%. 1000 milliLiter(s) IV Continuous <Continuous>  dextrose 5%. 1000 milliLiter(s) IV Continuous <Continuous>  dextrose 50% Injectable 25 Gram(s) IV Push once  dextrose 50% Injectable 12.5 Gram(s) IV Push once  dextrose 50% Injectable 25 Gram(s) IV Push once  dextrose Oral Gel 15 Gram(s) Oral once PRN  glucagon  Injectable 1 milliGRAM(s) IntraMuscular once  insulin lispro (ADMELOG) corrective regimen sliding scale   SubCutaneous every 6 hours  pantoprazole    Tablet 40 milliGRAM(s) Oral before breakfast  sodium chloride 0.9%. 1000 milliLiter(s) IV Continuous <Continuous>    Current Antimicrobials:   cefepime   IVPB 2000 milliGRAM(s) IV Intermittent every 12 hours    Prior/Completed Antimicrobials:  cefepime   IVPB [facility]FECTIOUS DISEASES  BRADFORD Nolasco S. Shah, Y. Patel, G. Richard  [phone]  ([phone] - weekdays after 5pm and weekends)    JESSIE GREER  69y, Male  [id number]    HPI:  Patient is a 69 year old male with PMH of T2DM, HTN, HPL, nephrolithiasis, and left kidney/urothelial cancer (s/p carboplatin/gemcitabine, stopped avelumab 1mth ago) complicated by Left sided mild-moderate hydronephrosis for which he had a stent placed (removed and exchange unsuccessful last week) sent by outpt nephrologist Dr. Sen for hyperkalemia (K 6.0), YANIRA (Scr 2.15) and PCN evaluation. Patient currently without any complaints. He feels a fullness in his L flank region but no pain. Denies any dysuria or hematuria. No issues voiding. No chest pains, palpitations, dyspnea, abdominal pain, constipation, diarrhea, nausea or vomiting. Patient ambulates with a cane. In the ED, patient was evaluated by nephrology. Hyperkalemia improved with lokelma. Also noted to have YANIRA. Patient admitted for PCN evaluation and further management.  Patient seen and examined at bedside this morning in the ER, wife at bedside. Patient reports he feels ok at this time, states some fullness/pain on L abd side with no change. Noted with fevers in the ER, per patient did not feel, states always feels warm, no chills/rigors. Denies nausea, vomiting, diarrhea, dysuria, or any changes in urine. Denies any other complaints. Per wife, patient had 2 days of cephalexin after recent stent exchange was aborted last week at [facility] [place].   ROS: 14 point review of systems completed, pertinent positives and negatives as per HPI.    Allergies: No Known Allergies    PMH -- Coronary artery disease  Hypertension  Hyperlipidemia  Asthma  Diabetes mellitus  Anxiety  Glaucoma  Herniated disc  Bilateral carpal tunnel syndrome  Neuropathy  Kidney stone on right side  Type 2 diabetes mellitus  Stented coronary artery    PSH -- S/P shoulder surgery  S/P hernia surgery  S/P angioplasty with stent  Tendon laceration  H/O colonoscopy    FH -- Family history of Alzheimer's disease (Mother)  Family history of type 2 diabetes mellitus in father (Father)  FH: COPD (chronic obstructive pulmonary disease) (Father)    Social History -- denies tobacco, alcohol or illicit drug use    Physical Exam--  Vital Signs Last 24 Hrs  T(F): 99 (02 Aug 2024 05:19), Max: 101.3 (01 Aug 2024 22:15)  HR: 91 (02 Aug 2024 05:19) (87 - 98)  BP: 116/71 (02 Aug 2024 05:19) (116/71 - 129/80)  RR: 18 (02 Aug 2024 05:19) (16 - 18)  SpO2: 96% (02 Aug 2024 05:19) (96% - 99%)  General: no acute distress  HEENT: NC/AT, EOMI, anicteric  Lungs: clear to auscultation bilaterally  Heart: S1, S2 present, normal rate/rhythm  Abdomen: Soft. ND. NT. BS present.   Neuro: AAOx3, no obvious focal deficits   Extremities: No cyanosis. No edema.   Skin: Warm. Dry. No visible rash.   Lines: PIV     Laboratory & Imaging Data--  CBC:                       8.4    20.77 )-----------( 555      ( 01 Aug 2024 20:14 )             28.5     WBC Count: 20.77 K/uL (08-01-24 @ 20:14)    CMP: 08-02    131<L>  |  97  |  32<H>  ----------------------------<  123<H>  4.7   |  20<L>  |  1.96<H>    Ca    9.3      02 Aug 2024 03:40  Mg     2.0     08-01    TPro  8.0  /  Alb  3.3  /  TBili  0.4  /  DBili  x   /  AST  13  /  ALT  25  /  AlkPhos  132<H>  08-01    LIVER FUNCTIONS - ( 01 Aug 2024 20:14 )  Alb: 3.3 g/dL / Pro: 8.0 g/dL / ALK PHOS: 132 U/L / ALT: 25 U/L / AST: 13 U/L / GGT: x           Urinalysis + Microscopic Examination (08.01.24 @ 21:15)    pH Urine: 5.5   Urine Appearance: Clear   Color: Yellow   Specific Gravity: 1.021   Protein, Urine: 30 mg/dL   Glucose Qualitative, Urine: Negative mg/dL   Ketone - Urine: Negative mg/dL   Blood, Urine: Negative   Bilirubin: Negative   Urobilinogen: 0.2 mg/dL   Leukocyte Esterase Concentration: Negative   Nitrite: Negative   White Blood Cell - Urine: 3 /HPF   Red Blood Cell - Urine: 1 /HPF   Bacteria: Negative /HPF   Cast: 4 /LPF   Epithelial Cells: 1 /HPF    Microbiology: reviewed  FluA/FluB/RSV/COVID PCR (08.01.24 @ 22:57)    SARS-CoV-2 Result: [facility]: This Respiratory Panel uses polymerase chain reaction (PCR) to detect for  influenza A; influenza B; respiratory syncytial virus; and SARS-CoV-2.  This test was validated by [facility] and is in use under the FDA  Emergency Use Authorization (EUA) for clinical labs CLIA-certified to  perform high complexity testing. Test results should be correlated with  clinical presentation, patient history, and epidemiology.   Influenza A Result: [facility]   Influenza B Result: [facility]   Resp Syn Virus Result: [facility]    Radiology--reviewed  < from: CT Abdomen and Pelvis No Cont (08.02.24 @ 00:21) >  IMPRESSION:  Ill-defined left renal mass, compatible with given history of known   urothelial malignancy. Multiple left perinephric tumor deposits with   suspected tumor thrombus in the left renal vein, suboptimally assessed   without intravenous contrast. Dilated left upper pole renal calyces to   the level of the renal pelvis which is likely filled with tumor. Further   evaluation with CT urogram can be performed.    Several ill-defined hypoattenuating liver lesions suspicious for   metastases. Left upper quadrant peritoneal implant.    Please note a preliminary report was provided by [facility].    < end of copied text >  < from: Xray Chest 1 View- PORTABLE-Urgent (Xray Chest 1 View- PORTABLE-Urgent .) (08.01.24 @ 23:14) >  IMPRESSION:    Clear lungs.    < end of copied text >    Active Medications--  acetaminophen     Tablet .. 650 milliGRAM(s) Oral every 6 hours PRN  atorvastatin 20 milliGRAM(s) Oral daily  cefepime   IVPB 2000 milliGRAM(s) IV Intermittent every 12 hours  dextrose 5%. 1000 milliLiter(s) IV Continuous <Continuous>  dextrose 5%. 1000 milliLiter(s) IV Continuous <Continuous>  dextrose 50% Injectable 25 Gram(s) IV Push once  dextrose 50% Injectable 12.5 Gram(s) IV Push once  dextrose 50% Injectable 25 Gram(s) IV Push once  dextrose Oral Gel 15 Gram(s) Oral once PRN  glucagon  Injectable 1 milliGRAM(s) IntraMuscular once  insulin lispro (ADMELOG) corrective regimen sliding scale   SubCutaneous every 6 hours  pantoprazole    Tablet 40 milliGRAM(s) Oral before breakfast  sodium chloride 0.9%. 1000 milliLiter(s) IV Continuous <Continuous>    Current Antimicrobials:   cefepime   IVPB 2000 milliGRAM(s) IV Intermittent every 12 hours    Prior/Completed Antimicrobials:  cefepime   IVPB

## 2024-08-02 NOTE — H&P ADULT - PROBLEM SELECTOR PLAN 6
No longer on antihypertensives because patient was getting  dizzy and lightheaded while he was taking them.  - Monitor BP off medications

## 2024-08-02 NOTE — CONSULT NOTE ADULT - ASSESSMENT
69 year old male with metastatic urothelial cancer of L kidney, hydro, YANIRA, fevers  - no acute  intervention -- per [facility] [place] Urologist, Dr. Gómez, stent placement is not an option as it was attempted and failed, and recommended IR perc neph tube  - continue antibiotics  - f/u cultures  - IV fluids  - monitor urine output  - trend creatinine  - d/w Dr. Gardner  70yo M with advanced urothelial carcinoma with mild hydronephrosis and concern for L pyelonephritis. At this time would not recommend drainage given that the extent of the hydronephrosis is mild and given extent of tumor burden risk of bleeding. Would recommend conservative management with IV abx to begin with and if patient does not respond to abx alone then would proceed with PCN placement. Patient not amenable to stent as [place] [facility] [place] and was unable 2/2 malignant obstruction     - Continue to monitor Cr  - F/u cultures  - broad specturm abx  - If patients fever curve not improving, can't clear cultures, decompensates hemodynamically, or YANIRA doesn't resolve then would proceed with PCN placement  - Discussed with Dr. Gardner

## 2024-08-02 NOTE — H&P ADULT - PROBLEM SELECTOR PLAN 2
Left kidney/urothelial cancer (s/p carboplatin/gemcitabine, stopped avelumab 1mth ago) complicated by Left sided mild-moderate hydronephrosis. [place] [facility] [place] exchange was attempted but was ultimately unsuccessful. Previous stent has since been removed.  - IR consult for percutaneous nephrostomy  - NPO for now for possible procedure  - Monitor I/Os, SCr

## 2024-08-02 NOTE — CONSULT NOTE ADULT - PROBLEM SELECTOR RECOMMENDATION 9
Pt with YANIRA iso known h/o L hydronephrosis (stent removed and unable to be replaced), ARB, and NSAID use. Darius AKERS/Sunrise reviewed. Pt seen by Dr. Sen in clinic on 8/1/24. Note reviewed. Scr near normal in 2023 before cancer diagnosis and starting chemotherapy. Then Scr increased from 2.1 (unknown date) to 2.4 (7/2/24), therefore referred for nephro eval. Last Scr elevated 2.15 on outpt labs done 8/1/24. On admission Scr elevated/stable at 2.02. UA +30 protein and 1 RBC, otherwise bland. No renal imaging available for review. Hold ARB. Discontinue NSAIDs. Recommend CT abd/pelvis non contrast to evaluate obstruction. Pt will require admission for stent/PCN placement. Check Debra, UCr, and UPCR. Sepsis workup as per primary team. Monitor labs, VS and strict I/O. Dose medications for eGFR. Pt with YANIRA iso known h/o L hydronephrosis (stent removed and unable to be replaced), ARB, and NSAID use. Darius AKERS/Sunrise reviewed. Pt seen by Dr. Sen in clinic on 8/1/24. Note reviewed. Scr near normal in 2023 before cancer diagnosis and starting chemotherapy. Then Scr increased from 2.1 (unknown date) to 2.4 (7/2/24), therefore referred for nephro eval. Last Scr elevated 2.15 and UPCR 0.4g on outpt labs done 8/1/24. On admission Scr elevated/stable at 2.02. UA +30 protein and 1 RBC, otherwise bland. No renal imaging available for review. Hold ARB. Discontinue NSAIDs. Recommend CT abd/pelvis non contrast to evaluate obstruction. Pt will require admission for stent/PCN placement. Check Debra, UCr. Sepsis workup as per primary team. Monitor labs, VS and strict I/O. Dose medications for eGFR.

## 2024-08-02 NOTE — H&P ADULT - NSHPPHYSICALEXAM_GEN_ALL_CORE
Vital Signs Last 24 Hrs  T(C): 37.2 (02 Aug 2024 05:19), Max: 38.5 (01 Aug 2024 22:15)  T(F): 99 (02 Aug 2024 05:19), Max: 101.3 (01 Aug 2024 22:15)  HR: 91 (02 Aug 2024 05:19) (87 - 98)  BP: 116/71 (02 Aug 2024 05:19) (116/71 - 129/80)  BP(mean): 89 (02 Aug 2024 03:43) (89 - 91)  RR: 18 (02 Aug 2024 05:19) (16 - 18)  SpO2: 96% (02 Aug 2024 05:19) (96% - 99%)    Parameters below as of 02 Aug 2024 05:19  Patient On (Oxygen Delivery Method): room air        CONSTITUTIONAL: Well-groomed, in no apparent distress  EYES: No conjunctival or scleral injection, non-icteric; PERRLA and symmetric  ENMT: No external nasal lesions; nasal mucosa not inflamed;  oral mucosa with moist membranes  RESPIRATORY: Breathing comfortably; lungs with mild expiratory wheeze  CARDIOVASCULAR: +S1S2, RRR, no M/G/R; no lower extremity edema  GASTROINTESTINAL:Soft, nondistended, nontender, +BS throughout, no rebound/guarding  MUSCULOSKELETAL: No digital clubbing or cyanosis; no paraspinal tenderness; no malalignment of extremities  SKIN: No rashes or ulcers noted; no subcutaneous nodules or induration palpable  NEUROLOGIC: CN grossly intact; sensation intact in LEs b/l to light touch; normal strength and tone  PSYCHIATRIC: A+O x 3; mood and affect appropriate; appropriate insight and judgment

## 2024-08-02 NOTE — CONSULT NOTE ADULT - ASSESSMENT
Patient is a 69 year old male with PMH of T2DM, HTN, HPL, nephrolithiasis, and left kidney/urothelial cancer (s/p carboplatin/gemcitabine, stopped avelumab 1mth ago) complicated by Left sided mild-moderate hydronephrosis for which he had a stent placed (removed and exchange unsuccessful last week) sent by outpt nephrologist Dr. Sen for hyperkalemia (K 6.0), YANIRA (Scr 2.15) and PCN evaluation. Per wife, patient had 2 days of cephalexin after recent stent exchange was aborted and stent unable to be replaced last week at [facility].     Sepsis vs sirs - fevers with leukocytosis   Hyperkalemia, YANIRA, L hydronephrosis     febrile in ER, asymptomatic per pt   leukocytosis noted -- labs on 7/26 at [facility] with WBC ~17k  UA negative, no gu symptoms noted   CTAP with L renal mass c/w known urothelial malignancy; multiple L perinephric tumor deposits with suspected tumor thrombus in L renal vein; liver lesions suspicious for mets; LUQ peritoneal implant  CXR negative, COVID/Flu/RSV negative   no sign of SSTI, abd nontender  has been on cephalexin since last week   Nephrology following    Recommendations:   Follow blood and urine cultures  Continue on cefepime for now   IR following for possible L PCN placement  Monitor temps/WBC  Continue rest of care per primary team     Over the weekend Dr. Michael Clements will be covering for our group. If you have any questions, concerns or new micro data, please reach out to them at [phone].     Talib Brooke M.D.  [facility], Division of Infectious Diseases  [phone]  After 5pm on weekdays and all day on weekends - please call [phone]  Available on [employer] TEAMS

## 2024-08-02 NOTE — H&P ADULT - NSHPREVIEWOFSYSTEMS_GEN_ALL_CORE
Review of Systems:   CONSTITUTIONAL: No fever, weight loss  EYES: No eye pain, visual disturbances, or discharge  ENMT:  No difficulty hearing, tinnitus, vertigo; No sinus or throat pain  RESPIRATORY: No SOB. No cough, wheezing, chills or hemoptysis  CARDIOVASCULAR: No chest pain, palpitations, dizziness, or leg swelling  GASTROINTESTINAL: No abdominal or epigastric pain. No nausea, vomiting, or hematemesis; No diarrhea or constipation. No melena or hematochezia.  GENITOURINARY: No dysuria, frequency, hematuria, or incontinence  NEUROLOGICAL: No headaches, memory loss, loss of strength, numbness, or tremors  SKIN: No itching, burning, rashes, or lesions   LYMPH NODES: No enlarged glands  ENDOCRINE: No heat or cold intolerance; No hair loss  MUSCULOSKELETAL: No joint pain or swelling; No muscle, back pain  PSYCHIATRIC: No depression, anxiety, mood swings, or difficulty sleeping  HEME/LYMPH: No easy bruising, or bleeding gums Review of Systems:   CONSTITUTIONAL: +fever, no weight loss  EYES: No eye pain, visual disturbances, or discharge  ENMT:  No difficulty hearing, tinnitus, vertigo; No sinus or throat pain  RESPIRATORY: No SOB. No cough, wheezing, chills or hemoptysis  CARDIOVASCULAR: No chest pain, palpitations, dizziness, or leg swelling  GASTROINTESTINAL: No abdominal or epigastric pain. No nausea, vomiting, or hematemesis; No diarrhea or constipation. No melena or hematochezia.  GENITOURINARY: No dysuria, frequency, hematuria, or incontinence  NEUROLOGICAL: No headaches, memory loss, loss of strength, numbness, or tremors  SKIN: No itching, burning, rashes, or lesions   MUSCULOSKELETAL: No joint pain or swelling; No muscle, back pain  HEME/LYMPH: No easy bruising, or bleeding gums

## 2024-08-02 NOTE — H&P ADULT - NSHPLABSRESULTS_GEN_ALL_CORE
08-02    131<L>  |  97  |  32<H>  ----------------------------<  123<H>  4.7   |  20<L>  |  1.96<H>  08-01    132<L>  |  97  |  35<H>  ----------------------------<  130<H>  5.6<H>   |  20<L>  |  2.02<H>    Ca    9.3      02 Aug 2024 03:40  Ca    10.0      01 Aug 2024 20:14  Mg     2.0     08-01    TPro  8.0  /  Alb  3.3  /  TBili  0.4  /  DBili  x   /  AST  13  /  ALT  25  /  AlkPhos  132<H>  08-01    Magnesium: 2.0 mg/dL (08-01-24 @ 20:14)        PT/INR - ( 01 Aug 2024 20:14 )   PT: 14.5 sec;   INR: 1.33 ratio         PTT - ( 01 Aug 2024 20:14 )  PTT:29.9 sec              Urinalysis Basic - ( 02 Aug 2024 03:40 )    Color: x / Appearance: x / SG: x / pH: x  Gluc: 123 mg/dL / Ketone: x  / Bili: x / Urobili: x   Blood: x / Protein: x / Nitrite: x   Leuk Esterase: x / RBC: x / WBC x   Sq Epi: x / Non Sq Epi: x / Bacteria: x                          8.4    20.77 )-----------( 555      ( 01 Aug 2024 20:14 )             28.5     IMAGING    < from: CT Abdomen and Pelvis No Cont (08.02.24 @ 00:21) >    IMPRESSION:  1.   9 cm left renal mass, presumably malignant.  2.   There is left renal hilar metastatic lymphadenopathy.  3.   There is mild-to-moderate hydronephrosis of the left kidney, without  hydroureter, likely due to obstruction of the left UPJ by compression or   direct  invasion of the aforementioned 9 cm left renal mass.  4.   There are multiple metastatic nodules in the left perinephric fat,  measuring up to 4.3 cm.  5.   There is a 3.5 cm metastasis at splenic hilum.  6.   7.5 cm low-intermediate density exophytic round right renal   structure with  suggestion ofa small eccentric nodular area at its interface with the   kidney  (image 53/series 301) is indeterminate, potentially malignant.  7.   Multiple hepatic metastases.    < end of copied text >

## 2024-08-02 NOTE — CONSULT NOTE ADULT - SUBJECTIVE AND OBJECTIVE BOX
Reason for consult: Met urothelial cancer    HPI: 69 year old male with hx of T2DM, HTN, HPL, nephrolithiasis, and left kidney/urothelial cancer (s/p carboplatin/gemcitabine, stopped avelumab 1mth ago) complicated by Left sided mild-moderate hydronephrosis for which he had a stent placed (removed and exchange unsuccessful last week) sent by outpt nephrologist Dr. Sen for hyperkalemia (K 6.0), YANIRA (Scr 2.15) and PCN evaluation. Pt currently without any complaints. He feels a fullness in his L flank region but no pain. Denies any dysuria or hematuria. No issues voiding. No chest pains, palpitations, SOB, abdominal pain, constipation ,diarrhea, nausea or vomiting. Pt ambulates with a cane. In the ED, patient was evaluated by nephrology. Hyperkalemia improved with lokelma. Also noted to have YANIRA. Pt admitted for PCN evaluation and further management.     Heme/onc consulted on this 68 y/o male with metastatic urothelial cancer, previously on gem carbo x 6 through 2023 -> avelumab since 2024 then held since 24 due to CRI anemia, dizziness. PET 2024 shows PD at multiple sites. Currently on treatment with enfortumab vedotin. 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. Herring at [facility].    PAST MEDICAL & SURGICAL HISTORY:  Coronary artery disease      Hypertension      Hyperlipidemia      Asthma  Denies use of Inhaler at this time      Anxiety      Glaucoma  bilat. " elevated pressure"- notes eye drops have been stopped for a year now      Herniated disc      Bilateral carpal tunnel syndrome      Neuropathy  fingers ,hands ,feet-bilateral      Kidney stone on right side      Type 2 diabetes mellitus      Stented coronary artery  2010 RIGHT Coronary Artery Stent      S/P shoulder surgery  1975- LEFT Shoulder      S/P hernia surgery        S/P angioplasty with stent  x 1       Tendon laceration  left hand       H/O colonoscopy          FAMILY HISTORY:  Family history of Alzheimer's disease (Mother)  Mother -  Age 82    Family history of type 2 diabetes mellitus in father (Father)  Father - Age 80    FH: COPD (chronic obstructive pulmonary disease) (Father)  Father -  Age 80        Alochol: Denied  Smoking: Nonsmoker  Drug Use: Denied  Marital Status:         Allergies    No Known Allergies    Intolerances        MEDICATIONS  (STANDING):  atorvastatin 20 milliGRAM(s) Oral daily  cefepime   IVPB 2000 milliGRAM(s) IV Intermittent every 12 hours  dextrose 5%. 1000 milliLiter(s) (50 mL/Hr) IV Continuous <Continuous>  dextrose 5%. 1000 milliLiter(s) (100 mL/Hr) IV Continuous <Continuous>  dextrose 50% Injectable 25 Gram(s) IV Push once  dextrose 50% Injectable 25 Gram(s) IV Push once  dextrose 50% Injectable 12.5 Gram(s) IV Push once  glucagon  Injectable 1 milliGRAM(s) IntraMuscular once  insulin lispro (ADMELOG) corrective regimen sliding scale   SubCutaneous every 6 hours  pantoprazole    Tablet 40 milliGRAM(s) Oral before breakfast  sodium chloride 0.9%. 1000 milliLiter(s) (75 mL/Hr) IV Continuous <Continuous>    MEDICATIONS  (PRN):  acetaminophen     Tablet .. 650 milliGRAM(s) Oral every 6 hours PRN Temp greater or equal to 38C (100.4F), Mild Pain (1 - 3)  dextrose Oral Gel 15 Gram(s) Oral once PRN Blood Glucose LESS THAN 70 milliGRAM(s)/deciliter      ROS  No fever, sweats, chills  No epistaxis, HA, sore throat  No CP, SOB, cough, sputum  No n/v/d, abd pain, melena, hematochezia  No edema  No rash  No anxiety  No back pain, joint pain  No bleeding, bruising  No dysuria, hematuria    T(C): 37.2 (24 @ 05:19), Max: 38.5 (24 @ 22:15)  HR: 91 (24 @ 05:19) (87 - 98)  BP: 116/71 (24 @ 05:19) (116/71 - 129/80)  RR: 18 (24 @ 05:19) (16 - 18)  SpO2: 96% (24 @ 05:19) (96% - 99%)  Wt(kg): --    PE  NAD  Awake, alert  Anicteric, MMM  RRR  CTAB  Abd soft, NT, ND  No c/c/e  No rash grossly  FROM                          8.4    20.77 )-----------( 555      ( 01 Aug 2024 20:14 )             28.5       08-    131<L>  |  97  |  32<H>  ----------------------------<  123<H>  4.7   |  20<L>  |  1.96<H>    Ca    9.3      02 Aug 2024 03:40  Mg     2.0         TPro  8.0  /  Alb  3.3  /  TBili  0.4  /  DBili  x   /  AST  13  /  ALT  25  /  AlkPhos  132<H>

## 2024-08-02 NOTE — H&P ADULT - PROBLEM SELECTOR PLAN 7
DVT ppx - SCDs for now in anticipation of possible procedure. Afterwards, heparin subq  Diet - NPO for procedure; if delayed resume CC DASH diet  Dispo - pending

## 2024-08-02 NOTE — ED ADULT NURSE REASSESSMENT NOTE - NS ED NURSE REASSESS COMMENT FT1
Assumed care of pt from BETITO Kumar. KAILEY. Pt awaiting CT scan results. Safety and comfort measures maintained.

## 2024-08-03 LAB
A1C WITH ESTIMATED AVERAGE GLUCOSE RESULT: 6.6 % — HIGH (ref 4–5.6)
ANION GAP SERPL CALC-SCNC: 12 MMOL/L — SIGNIFICANT CHANGE UP (ref 5–17)
BUN SERPL-MCNC: 31 MG/DL — HIGH (ref 7–23)
CALCIUM SERPL-MCNC: 9.7 MG/DL — SIGNIFICANT CHANGE UP (ref 8.4–10.5)
CHLORIDE SERPL-SCNC: 98 MMOL/L — SIGNIFICANT CHANGE UP (ref 96–108)
CO2 SERPL-SCNC: 20 MMOL/L — LOW (ref 22–31)
CREAT SERPL-MCNC: 2.09 MG/DL — HIGH (ref 0.5–1.3)
EGFR: 33 ML/MIN/1.73M2 — LOW
ESTIMATED AVERAGE GLUCOSE: 143 MG/DL — HIGH (ref 68–114)
FERRITIN SERPL-MCNC: 1457 NG/ML — HIGH (ref 30–400)
FOLATE SERPL-MCNC: 4.5 NG/ML — LOW
GLUCOSE BLDC GLUCOMTR-MCNC: 123 MG/DL — HIGH (ref 70–99)
GLUCOSE BLDC GLUCOMTR-MCNC: 150 MG/DL — HIGH (ref 70–99)
GLUCOSE BLDC GLUCOMTR-MCNC: 168 MG/DL — HIGH (ref 70–99)
GLUCOSE BLDC GLUCOMTR-MCNC: 189 MG/DL — HIGH (ref 70–99)
GLUCOSE SERPL-MCNC: 127 MG/DL — HIGH (ref 70–99)
HCT VFR BLD CALC: 23.9 % — LOW (ref 39–50)
HGB BLD-MCNC: 7.5 G/DL — LOW (ref 13–17)
IRON SATN MFR SERPL: 13 UG/DL — LOW (ref 45–165)
IRON SATN MFR SERPL: 9 % — LOW (ref 16–55)
MCHC RBC-ENTMCNC: 24 PG — LOW (ref 27–34)
MCHC RBC-ENTMCNC: 31.4 GM/DL — LOW (ref 32–36)
MCV RBC AUTO: 76.6 FL — LOW (ref 80–100)
NRBC # BLD: 0 /100 WBCS — SIGNIFICANT CHANGE UP (ref 0–0)
PLATELET # BLD AUTO: 455 K/UL — HIGH (ref 150–400)
POTASSIUM SERPL-MCNC: 4.8 MMOL/L — SIGNIFICANT CHANGE UP (ref 3.5–5.3)
POTASSIUM SERPL-SCNC: 4.8 MMOL/L — SIGNIFICANT CHANGE UP (ref 3.5–5.3)
RBC # BLD: 3.12 M/UL — LOW (ref 4.2–5.8)
RBC # FLD: 17 % — HIGH (ref 10.3–14.5)
SODIUM SERPL-SCNC: 130 MMOL/L — LOW (ref 135–145)
TIBC SERPL-MCNC: 141 UG/DL — LOW (ref 220–430)
UIBC SERPL-MCNC: 128 UG/DL — SIGNIFICANT CHANGE UP (ref 110–370)
VIT B12 SERPL-MCNC: 643 PG/ML — SIGNIFICANT CHANGE UP (ref 232–1245)
WBC # BLD: 15.12 K/UL — HIGH (ref 3.8–10.5)
WBC # FLD AUTO: 15.12 K/UL — HIGH (ref 3.8–10.5)

## 2024-08-03 RX ADMIN — ATORVASTATIN CALCIUM 20 MILLIGRAM(S): 40 TABLET, FILM COATED ORAL at 11:43

## 2024-08-03 RX ADMIN — CEFEPIME HYDROCHLORIDE 100 MILLIGRAM(S): 1 INJECTION, POWDER, FOR SOLUTION INTRAMUSCULAR; INTRAVENOUS at 18:41

## 2024-08-03 RX ADMIN — CEFEPIME HYDROCHLORIDE 100 MILLIGRAM(S): 1 INJECTION, POWDER, FOR SOLUTION INTRAMUSCULAR; INTRAVENOUS at 05:18

## 2024-08-03 RX ADMIN — PANTOPRAZOLE SODIUM 40 MILLIGRAM(S): 20 TABLET, DELAYED RELEASE ORAL at 05:18

## 2024-08-03 NOTE — PROGRESS NOTE ADULT - NS ATTEND AMEND GEN_ALL_CORE FT
Patient seen and examined this morning on rounds with NP.  Please see her note for details.  Agree with assessment and plan.  Patient with hydronephrosis secondary to obstruction at the ureteropelvic junction most likely a result of his left kidney urothelial cancer.  Unsuccessful attempt at stent exchange at [facility].  Evaluated by IR and due to large tumor burden high-risk for bleeding and seeding of tumor to attempt percutaneous nephrostomy tube patient continues to have low-grade fevers without evidence of decompensation.  As per Urology will continue with IV antibiotics and if he  is unstable or no improvement in creatinine can consider PCN placement

## 2024-08-04 LAB
ANION GAP SERPL CALC-SCNC: 16 MMOL/L — SIGNIFICANT CHANGE UP (ref 5–17)
BUN SERPL-MCNC: 30 MG/DL — HIGH (ref 7–23)
CALCIUM SERPL-MCNC: 9.6 MG/DL — SIGNIFICANT CHANGE UP (ref 8.4–10.5)
CHLORIDE SERPL-SCNC: 97 MMOL/L — SIGNIFICANT CHANGE UP (ref 96–108)
CO2 SERPL-SCNC: 20 MMOL/L — LOW (ref 22–31)
CREAT SERPL-MCNC: 2.06 MG/DL — HIGH (ref 0.5–1.3)
EGFR: 34 ML/MIN/1.73M2 — LOW
GLUCOSE BLDC GLUCOMTR-MCNC: 125 MG/DL — HIGH (ref 70–99)
GLUCOSE BLDC GLUCOMTR-MCNC: 132 MG/DL — HIGH (ref 70–99)
GLUCOSE BLDC GLUCOMTR-MCNC: 139 MG/DL — HIGH (ref 70–99)
GLUCOSE BLDC GLUCOMTR-MCNC: 147 MG/DL — HIGH (ref 70–99)
GLUCOSE SERPL-MCNC: 109 MG/DL — HIGH (ref 70–99)
HCT VFR BLD CALC: 25 % — LOW (ref 39–50)
HGB BLD-MCNC: 7.5 G/DL — LOW (ref 13–17)
MAGNESIUM SERPL-MCNC: 2 MG/DL — SIGNIFICANT CHANGE UP (ref 1.6–2.6)
MCHC RBC-ENTMCNC: 23.7 PG — LOW (ref 27–34)
MCHC RBC-ENTMCNC: 30 GM/DL — LOW (ref 32–36)
MCV RBC AUTO: 78.9 FL — LOW (ref 80–100)
NRBC # BLD: 0 /100 WBCS — SIGNIFICANT CHANGE UP (ref 0–0)
PLATELET # BLD AUTO: 452 K/UL — HIGH (ref 150–400)
POTASSIUM SERPL-MCNC: 5.1 MMOL/L — SIGNIFICANT CHANGE UP (ref 3.5–5.3)
POTASSIUM SERPL-SCNC: 5.1 MMOL/L — SIGNIFICANT CHANGE UP (ref 3.5–5.3)
RBC # BLD: 3.17 M/UL — LOW (ref 4.2–5.8)
RBC # FLD: 17 % — HIGH (ref 10.3–14.5)
SODIUM SERPL-SCNC: 133 MMOL/L — LOW (ref 135–145)
WBC # BLD: 16.79 K/UL — HIGH (ref 3.8–10.5)
WBC # FLD AUTO: 16.79 K/UL — HIGH (ref 3.8–10.5)

## 2024-08-04 RX ORDER — FERROUS SULFATE 325(65) MG
325 TABLET ORAL DAILY
Refills: 0 | Status: DISCONTINUED | OUTPATIENT
Start: 2024-08-04 | End: 2024-08-05

## 2024-08-04 RX ORDER — MULTIVITAMIN/IRON/FOLIC ACID 18MG-0.4MG
1 TABLET ORAL DAILY
Refills: 0 | Status: DISCONTINUED | OUTPATIENT
Start: 2024-08-04 | End: 2024-08-05

## 2024-08-04 RX ADMIN — Medication 325 MILLIGRAM(S): at 12:06

## 2024-08-04 RX ADMIN — CEFEPIME HYDROCHLORIDE 100 MILLIGRAM(S): 1 INJECTION, POWDER, FOR SOLUTION INTRAMUSCULAR; INTRAVENOUS at 04:59

## 2024-08-04 RX ADMIN — Medication 1 MILLIGRAM(S): at 12:06

## 2024-08-04 RX ADMIN — CEFEPIME HYDROCHLORIDE 100 MILLIGRAM(S): 1 INJECTION, POWDER, FOR SOLUTION INTRAMUSCULAR; INTRAVENOUS at 17:48

## 2024-08-04 RX ADMIN — PANTOPRAZOLE SODIUM 40 MILLIGRAM(S): 20 TABLET, DELAYED RELEASE ORAL at 04:58

## 2024-08-04 RX ADMIN — ATORVASTATIN CALCIUM 20 MILLIGRAM(S): 40 TABLET, FILM COATED ORAL at 12:06

## 2024-08-04 NOTE — PROGRESS NOTE ADULT - NS ATTEND AMEND GEN_ALL_CORE FT
Patient seen and examined this morning on rounds with PA.  Please see her note for details.  I agree with assessment and plan.  Continues with IV antibiotics.  Afebrile over 24 hours.  Creatinine remained stable.  Await input from Infectious Disease regarding length of antibiotics.  No growth to date on cultures

## 2024-08-05 ENCOUNTER — NON-APPOINTMENT (OUTPATIENT)
Age: 69
End: 2024-08-05

## 2024-08-05 ENCOUNTER — TRANSCRIPTION ENCOUNTER (OUTPATIENT)
Age: 69
End: 2024-08-05

## 2024-08-05 VITALS
SYSTOLIC BLOOD PRESSURE: 119 MMHG | DIASTOLIC BLOOD PRESSURE: 74 MMHG | TEMPERATURE: 99 F | OXYGEN SATURATION: 98 % | HEART RATE: 87 BPM | RESPIRATION RATE: 18 BRPM

## 2024-08-05 LAB
ANION GAP SERPL CALC-SCNC: 13 MMOL/L — SIGNIFICANT CHANGE UP (ref 5–17)
BUN SERPL-MCNC: 30 MG/DL — HIGH (ref 7–23)
CALCIUM SERPL-MCNC: 10 MG/DL — SIGNIFICANT CHANGE UP (ref 8.4–10.5)
CHLORIDE SERPL-SCNC: 97 MMOL/L — SIGNIFICANT CHANGE UP (ref 96–108)
CO2 SERPL-SCNC: 21 MMOL/L — LOW (ref 22–31)
CREAT SERPL-MCNC: 2.15 MG/DL — HIGH (ref 0.5–1.3)
EGFR: 32 ML/MIN/1.73M2 — LOW
GLUCOSE BLDC GLUCOMTR-MCNC: 122 MG/DL — HIGH (ref 70–99)
GLUCOSE BLDC GLUCOMTR-MCNC: 133 MG/DL — HIGH (ref 70–99)
GLUCOSE BLDC GLUCOMTR-MCNC: 134 MG/DL — HIGH (ref 70–99)
GLUCOSE SERPL-MCNC: 117 MG/DL — HIGH (ref 70–99)
HCT VFR BLD CALC: 25.9 % — LOW (ref 39–50)
HGB BLD-MCNC: 8.1 G/DL — LOW (ref 13–17)
MCHC RBC-ENTMCNC: 24.4 PG — LOW (ref 27–34)
MCHC RBC-ENTMCNC: 31.3 GM/DL — LOW (ref 32–36)
MCV RBC AUTO: 78 FL — LOW (ref 80–100)
NRBC # BLD: 0 /100 WBCS — SIGNIFICANT CHANGE UP (ref 0–0)
PLATELET # BLD AUTO: 459 K/UL — HIGH (ref 150–400)
POTASSIUM SERPL-MCNC: 4.7 MMOL/L — SIGNIFICANT CHANGE UP (ref 3.5–5.3)
POTASSIUM SERPL-SCNC: 4.7 MMOL/L — SIGNIFICANT CHANGE UP (ref 3.5–5.3)
RBC # BLD: 3.32 M/UL — LOW (ref 4.2–5.8)
RBC # FLD: 16.9 % — HIGH (ref 10.3–14.5)
SODIUM SERPL-SCNC: 131 MMOL/L — LOW (ref 135–145)
WBC # BLD: 16.62 K/UL — HIGH (ref 3.8–10.5)
WBC # FLD AUTO: 16.62 K/UL — HIGH (ref 3.8–10.5)

## 2024-08-05 PROCEDURE — 85610 PROTHROMBIN TIME: CPT

## 2024-08-05 PROCEDURE — 82728 ASSAY OF FERRITIN: CPT

## 2024-08-05 PROCEDURE — 85730 THROMBOPLASTIN TIME PARTIAL: CPT

## 2024-08-05 PROCEDURE — 80048 BASIC METABOLIC PNL TOTAL CA: CPT

## 2024-08-05 PROCEDURE — 82607 VITAMIN B-12: CPT

## 2024-08-05 PROCEDURE — 87040 BLOOD CULTURE FOR BACTERIA: CPT

## 2024-08-05 PROCEDURE — 87086 URINE CULTURE/COLONY COUNT: CPT

## 2024-08-05 PROCEDURE — 82962 GLUCOSE BLOOD TEST: CPT

## 2024-08-05 PROCEDURE — 81001 URINALYSIS AUTO W/SCOPE: CPT

## 2024-08-05 PROCEDURE — 99285 EMERGENCY DEPT VISIT HI MDM: CPT

## 2024-08-05 PROCEDURE — 83550 IRON BINDING TEST: CPT

## 2024-08-05 PROCEDURE — 87637 SARSCOV2&INF A&B&RSV AMP PRB: CPT

## 2024-08-05 PROCEDURE — 99232 SBSQ HOSP IP/OBS MODERATE 35: CPT

## 2024-08-05 PROCEDURE — 83735 ASSAY OF MAGNESIUM: CPT

## 2024-08-05 PROCEDURE — 82746 ASSAY OF FOLIC ACID SERUM: CPT

## 2024-08-05 PROCEDURE — 83036 HEMOGLOBIN GLYCOSYLATED A1C: CPT

## 2024-08-05 PROCEDURE — 80053 COMPREHEN METABOLIC PANEL: CPT

## 2024-08-05 PROCEDURE — 74176 CT ABD & PELVIS W/O CONTRAST: CPT | Mod: MC

## 2024-08-05 PROCEDURE — 85025 COMPLETE CBC W/AUTO DIFF WBC: CPT

## 2024-08-05 PROCEDURE — 71045 X-RAY EXAM CHEST 1 VIEW: CPT

## 2024-08-05 PROCEDURE — 83540 ASSAY OF IRON: CPT

## 2024-08-05 PROCEDURE — 85027 COMPLETE CBC AUTOMATED: CPT

## 2024-08-05 RX ORDER — CEPHALEXIN 250 MG
1 CAPSULE ORAL
Refills: 0 | DISCHARGE

## 2024-08-05 RX ADMIN — CEFEPIME HYDROCHLORIDE 100 MILLIGRAM(S): 1 INJECTION, POWDER, FOR SOLUTION INTRAMUSCULAR; INTRAVENOUS at 05:21

## 2024-08-05 RX ADMIN — PANTOPRAZOLE SODIUM 40 MILLIGRAM(S): 20 TABLET, DELAYED RELEASE ORAL at 05:21

## 2024-08-05 RX ADMIN — ATORVASTATIN CALCIUM 20 MILLIGRAM(S): 40 TABLET, FILM COATED ORAL at 12:22

## 2024-08-05 RX ADMIN — Medication 325 MILLIGRAM(S): at 12:22

## 2024-08-05 RX ADMIN — Medication 1 MILLIGRAM(S): at 12:22

## 2024-08-05 NOTE — DISCHARGE NOTE NURSING/CASE MANAGEMENT/SOCIAL WORK - PATIENT PORTAL LINK FT
You can access the FollowMyHealth Patient Portal offered by [facility] by registering at the following website: [email or web address]. By joining [employer]’s FollowMyHealth portal, you will also be able to view your health information using other applications (apps) compatible with our system.

## 2024-08-05 NOTE — DISCHARGE NOTE PROVIDER - CARE PROVIDER_API CALL
OCTAVIO MARTINEZ  [address] [place] [address]  Phone: [phone]  Fax: [phone]  Follow Up Time:     Angelita Sen  Nephrology  [address] [place] [address], [address]  [place] [address]  Phone: [phone]  Fax: [phone]  Follow Up Time:

## 2024-08-05 NOTE — DISCHARGE NOTE PROVIDER - HOSPITAL COURSE
HPI:  69 year old male with hx of T2DM, HTN, HPL, nephrolithiasis, and left kidney/urothelial cancer (s/p carboplatin/gemcitabine, stopped avelumab 1mth ago) complicated by Left sided mild-moderate hydronephrosis for which he had a stent placed (removed and exchange unsuccessful last week) sent by outpt nephrologist Dr. Sen for hyperkalemia (K 6.0), YANIRA (Scr 2.15) and PCN evaluation. Pt currently without any complaints. He feels a fullness in his L flank region but no pain. Denies any dysuria or hematuria. No issues voiding. No chest pains, palpitations, SOB, abdominal pain, constipation ,diarrhea, nausea or vomiting. Pt ambulates with a cane. In the ED, patient was evaluated by nephrology. Hyperkalemia improved with lokelma. Also noted to have YANIRA. Pt admitted for PCN evaluation and further management.  (02 Aug 2024 05:19)    Hospital Course:  # Sepsis:  - BCx negative, UCx negative  - Ua negative for UTI  - CXR w/ clear lungs  - Trend temps and CBS'  - Tylenol prn for fevers  - iv abx stopped  - ID following    # Hydronephrosis 2/2 to obstruction of ureteropelvic junction (UPJ):  -  Left kidney/urothelial cancer (s/p carboplatin/gemcitabine, stopped avelumab 1mth ago) complicated by Left sided mild-moderate hydronephrosis. Was at [facility] last week where stent exchange was attempted but was ultimately unsuccessful. Previous stent has since been removed.  - CT A/P reviewed  - IR eval -> per IR-> large tumor burden on left kidney; high risk for bleeding and seeding of tumor if percutaneous nephrostomy is placed  - Per Uro-> If patients fever curve not improving, can't clear cultures, decompensates hemodynamically, or YANIRA doesn't resolve then would proceed with PCN placement. cultures cleared, however yanira not resolved and patient with fever overnight - IR called to re-eval patient for pcn, Ir required Ct w/iv contrast to evaluate for safe window. This was discussed with patient and his wife. The risk of further kidney injury discussed, however explained that he can be given IVF before and after to reduce effect on kidneys. However, patient stated he does not want the study at this time and wants to follow up with his [facility] doctor and renal doctor outpatient, and will decide outpatient on further management. given patient is A&Ox3 and understands his medical condition and  cr stable, no infection, improved potassium, patient is cleared for discharge     - Heme Onc following  # YANIRA/ Hyperkalemia:  - Monitor I/O's  - Serial Cr  - Avoid nephrotoxins  - Renally dose medications  - S/p Lokelma, trend K  - Renal following    # Dm2:  - HgbA1c 6.6  - Continue to monitor blood glucose levels  - Sliding Scale    # HTN:  - Trend BP, currently off BP meds    # GI:  - Bowel regimen prn    # DVT ppx:  - IPC's    Important Medication Changes and Reason: no change    Active or Pending Issues Requiring Follow-up: oncology, renal    Advanced Directives:   [x ] Full code  [ ] DNR  [ ] Hospice    Discharge Diagnoses:  kidney cancer

## 2024-08-05 NOTE — PROGRESS NOTE ADULT - PROBLEM SELECTOR PLAN 1
Pt with YANIRA iso known h/o L hydronephrosis (stent removed and unable to be replaced), ARB, and NSAID use. Darius AKERS/[facility] reviewed. Pt seen by Dr. Sen in clinic on 8/1/24. Note reviewed.   -Scr near normal in 2023 before cancer diagnosis and starting chemotherapy.  - increased from 2.1 (unknown date) to 2.4 (7/2/24), therefore referred for nephro eval. Last Scr elevated 2.15 and UPCR 0.4g on outpt labs done 8/1/24. On admission Scr elevated/stable at 2.02. UA +30 protein and 1 RBC, otherwise bland. No renal imaging available for review.   -patient with overall stable creatinine but continued fevers.  Agree with ID re: need for further Urology/IR input (d/w ACP)  -Continue holding ARB, NSAIDs.

## 2024-08-05 NOTE — DISCHARGE NOTE PROVIDER - NSDCCPCAREPLAN_GEN_ALL_CORE_FT
PRINCIPAL DISCHARGE DIAGNOSIS  Diagnosis: Hyperkalemia  Assessment and Plan of Treatment: Resolved after treatment of hyperkalemia. Potassium stable since.      SECONDARY DISCHARGE DIAGNOSES  Diagnosis: Sepsis  Assessment and Plan of Treatment: concern for sepsis given elevated white count and fevers. Infectious disease evaluated while admitted. treated with Iv cefepime while inpatient. Once infectious source ruled out, antibiotics stopped. fevers and white count likely in setting of cancer.    Diagnosis: Acquired hydronephrosis due to obstruction of ureteropelvic junction (UPJ)  Assessment and Plan of Treatment: secondary to kidney/urothelial cancer. Attempted stent placement at [facility]. CT with IV contrast necessary to evaluate for percutaneous nephrostomy tube placement given the tumor burden and risk of bleeding. At this time decision was made the patient to follow up with outpatient oncologist and neohrologist and decide further managmeent outpatient.    Diagnosis: YANIRA (acute kidney injury)  Assessment and Plan of Treatment: due to above.

## 2024-08-05 NOTE — PROGRESS NOTE ADULT - ASSESSMENT
69 year old male with hx of T2DM, HTN, HPL, nephrolithiasis, and left kidney/urothelial cancer (s/p carboplatin/gemcitabine, stopped avelumab 1mth ago) complicated by Left sided mild-moderate hydronephrosis for which he had a stent placed (removed and exchange unsuccessful last week) sent by outpt nephrologist Dr. Sen for hyperkalemia (K 6.0), YANIRA (Scr 2.15) and PCN evaluation.       Plan:    # Sepsis:  - BCx negative, UCx negative  - Ua negative for UTI  - CXR w/ clear lungs  - Trend temps and CBS'  - Tylenol prn for fevers  - C/w Iv abx per ID rec's  - ID following    # Hydronephrosis 2/2 to obstruction of ureteropelvic junction (UPJ):  -  Left kidney/urothelial cancer (s/p carboplatin/gemcitabine, stopped avelumab 1mth ago) complicated by Left sided mild-moderate hydronephrosis. Was at [facility] [place] last week where stent exchange was attempted but was ultimately unsuccessful. Previous stent has since been removed.  - CT A/P reviewed  - IR eval -> per IR-> large tumor burden on left kidney; high risk for bleeding and seeding of tumor if percutaneous nephrostomy is placed  - Per Uro-> If patients fever curve not improving, can't clear cultures, decompensates hemodynamically, or YANIRA doesn't resolve then would proceed with PCN placement  - Heme Onc following    # YANIRA/ Hyperkalemia:  - Monitor I/O's  - Serial Cr  - Avoid nephrotoxins  - Renally dose medications  - S/p Lokelma, trend K  - Renal following    # Dm2:  - HgbA1c 6.6  - Continue to monitor blood glucose levels  - Sliding Scale    # HTN:  - Trend BP, currently off BP meds    # GI:  - Bowel regimen prn    # DVT ppx:  - IPC's    [employer]  [phone]                
69 year old male with hx of T2DM, HTN, HPL, nephrolithiasis, and left kidney/urothelial cancer (s/p carboplatin/gemcitabine, stopped avelumab 1mth ago) complicated by Left sided mild-moderate hydronephrosis for which he had a stent placed (removed and exchange unsuccessful last week) sent by outpt nephrologist Dr. Sen for hyperkalemia (K 6.0), YANIRA (Scr 2.15) and PCN evaluation.       Plan:    # Sepsis:  - BCx pending  - Ua negative for UTI  - CXR w/ clear lungs  - Trend temps and CBS'  - Tylenol prn for fevers  - C/w Iv abx  - ID following    # Hydronephrosis 2/2 to obstruction of ureteropelvic junction (UPJ):  -  Left kidney/urothelial cancer (s/p carboplatin/gemcitabine, stopped avelumab 1mth ago) complicated by Left sided mild-moderate hydronephrosis. Was at [facility] [place] last week where stent exchange was attempted but was ultimately unsuccessful. Previous stent has since been removed.  - CT A/P reviewed  - IR eval -> per IR-> large tumor burden on left kidney; high risk for bleeding and seeding of tumor if percutaneous nephrostomy is placed  - Per Uro-> If patients fever curve not improving, can't clear cultures, decompensates hemodynamically, or YANIRA doesn't resolve then would proceed with PCN placement  - Heme Onc following    # YANIRA/ Hyperkalemia:  - Monitor I/O's  - Serial Cr  - Avoid nephrotoxins  - Renally dose medications  - S/p Lokelma, trend K  - Renal following    # Dm2:  - HgbA1c  - Continue to monitor blood glucose levels  - Sliding Scale    # HTN:  - Trend BP, currently off BP meds    # GI:  - Bowel regimen prn    # DVT ppx:  - IPC's    [facility]  [phone]                
Patient is a 69 year old male with T2DM, HTN, HPL, nephrolithiasis, and left kidney/urothelial cancer (s/p carboplatin/gemcitabine, stopped avelumab 1mth ago) complicated by Left sided mild-moderate hydronephrosis for which he had a stent placed (removed and exchange unsuccessful last week) sent by outpt nephrologist Dr. Sen for hyperkalemia (K 6.0), YANIRA (Scr 2.15) and PCN evaluation. Per wife, patient had 2 days of cephalexin after recent stent exchange was aborted and stent unable to be replaced last week at [facility] [place].     Sepsis vs sirs - fevers with leukocytosis   Hyperkalemia, YANIRA, L hydronephrosis     febrile in ER, asymptomatic per pt   leukocytosis noted -- labs on 7/26 at [facility] [place] with WBC ~17k  UA negative, no gu symptoms noted   CTAP with L renal mass c/w known urothelial malignancy; multiple L perinephric tumor deposits with suspected tumor thrombus in L renal vein; liver lesions suspicious for mets; LUQ peritoneal implant  CXR negative, COVID/Flu/RSV negative   no sign of SSTI, abd nontender  has been on cephalexin since last week   Nephrology following    Recommendations:   Blood cultures-NGTD  Urine Cultures-NGTD  cefepime started 8/1 late -continue for now    Thank you for consulting us and involving us in the management of this most interesting and challenging case.  We will follow along in the care of this patient. Please call us at [phone] or text me directly on my cell# at [phone] with any concerns.    Monday Dr. Talib Brooke will be covering for our group. If you have any questions, concerns or new micro data please reach out to them at [phone]     
69 year old male with hx of T2DM, HTN, HPL, nephrolithiasis, and left kidney/urothelial cancer (s/p carboplatin/gemcitabine, stopped avelumab 1mth ago) complicated by Left sided mild-moderate hydronephrosis for which he had a stent placed (removed and exchange unsuccessful last week) sent by outpt nephrologist Dr. Sen for hyperkalemia (K 6.0), YANIRA (Scr 2.15) and PCN evaluation.    Metastatic urothelial cancer  - Previously on gem carbo x 6 through 12/2023 -> avelumab since 2/2024 then held since 6/18/24 due to CRI, anemia, dizziness. PET 7/2024 shows PD at multiple sites. Currently on treatment with enfortumab vedotin.   - Follows with Dr. Herring at [facility] [place].  - No plan for treatment while admitted    Anemia   - due to cancer, treatment, [place] 7[place]16, [place] [facility] [place]  - Checking iron studies, B12, folate    YANIRA, PCN evaluation, fever  - Recently stent exchange was attempted but ultimately unsuccessful [place] [facility]  2/2 malignant obstruction, stent since removed  - CT AP: Ill-defined left renal mass, compatible with given history of known urothelial malignancy. Multiple left perinephric tumor deposits with suspected tumor thrombus in the left renal vein, suboptimally assessed without intravenous contrast. Dilated left upper pole renal calyces to the level of the renal pelvis which is likely filled with tumor. Further evaluation with CT urogram can be performed. Several ill-defined hypoattenuating liver lesions suspicious for metastases. Left upper quadrant peritoneal implant.  - Per nephrology, YANIRA due to L hydronephrosis, ARB, NSAID use  - Per IR, large tumor burden on left kidney; high risk for bleeding and seeding of tumor if percutaneous nephrostomy is placed.   - Urology following - monitoring fever curve, if patient decompensates hemodynamically or YANIRA doesn't resolve then would proceed with PCN   - Will continue cefepime per ID, has been on cephalexin since last week. Blood/urine cultures so far negative.  - Febrile overnight 8/3    Will continue to follow.    Buddy Bond PA-C  Hematology/Oncology  [facility]  [phone] (office)
69 year old male with hx of T2DM, HTN, HPL, nephrolithiasis, and left kidney/urothelial cancer (s/p carboplatin/gemcitabine, stopped avelumab 1mth ago) complicated by Left sided mild-moderate hydronephrosis for which he had a stent placed (removed and exchange unsuccessful last week) sent by outpt nephrologist Dr. Sen for hyperkalemia (K 6.0), YANIRA (Scr 2.15) and PCN evaluation.    Metastatic urothelial cancer  - Previously on gem carbo x 6 through 12/2023 -> avelumab since 2/2024 then held since 6/18/24 due to CRI, anemia, dizziness. PET 7/2024 shows PD at multiple sites. Planned for treatment with enfortumab vedotin.   - Follows with Dr. Herring at [facility] [place].  - No plan for treatment while admitted    Anemia   - Multifactorial d/t malignancy, treatment, CKI, iron/folate def  - [place] 5[place]31[place] [facility] [place]  - Iron studies and folate low, start oral folic acid and iron supplementation    YANIRA, PCN evaluation, Possible L pyelonephritis   - Recently stent [place] was attempted but ultimately [place] [facility] [place]  2/2 malignant obstruction, stent since removed  - CT now shows L renal mass, multiple L perinephric tumor deposits with suspected tumor thrombus in the left renal vein, suboptimally assessed without intravenous contrast. Dilated left upper pole renal calyces to the level of the renal pelvis which is likely filled with tumor. Several ill-defined hypoattenuating liver lesions suspicious for metastases. LUQ peritoneal implant.   - Per nephrology, YANIRA in setting of L hydronephrosis, ARB, NSAID use  - Per IR, large tumor burden on L kidney; high risk for bleeding and seeding of tumor if percutaneous nephrostomy is placed.   - Urology following - monitoring fever curve, if patient decompensates hemodynamically or YANIRA doesn't resolve then would proceed with PCN   - Cefepime discontinued per ID as no infectious source found    Will continue to follow.    Nasra Block MD  Hematology/Oncology  [facility]  [phone] (office)
Patient is a 69 year old male with T2DM, HTN, HPL, nephrolithiasis, and left kidney/urothelial cancer (s/p carboplatin/gemcitabine, stopped avelumab 1mth ago) complicated by Left sided mild-moderate hydronephrosis for which he had a stent placed (removed and exchange unsuccessful last week) sent by outpt nephrologist Dr. Sen for hyperkalemia (K 6.0), YANIRA (Scr 2.15) and PCN evaluation. Per wife, patient had 2 days of cephalexin after recent stent exchange was aborted and stent unable to be replaced last week at [facility] [place].     Sepsis vs sirs - fevers with leukocytosis   Hyperkalemia, YANIRA, L hydronephrosis     fever curve noted -- last temp 8/4pm - pt remains asymptomatic  WBC trend noted -- improved, labs on 7/26 at [facility] [place] with WBC ~17k  UA and Ucx negative, no gu symptoms noted   CTAP with L renal mass c/w known urothelial malignancy; multiple L perinephric tumor deposits with suspected tumor thrombus in L renal vein; liver lesions suspicious for mets; LUQ peritoneal implant  CXR negative, COVID/Flu/RSV negative   Bcx NGTD x2 (72h)  no sign of SSTI, abd nontender  was on cephalexin since week prior to presentation  Nephrology following, Cr uptrending   IR eval noted - large tumor burden on L kidney  No infectious source found to date, suspect sirs likely malignancy related     Recommendations:   Urology/IR follow up given fever and uptrending Cr   Can discontinue cefepime given no infectious source  Continue rest of care per primary team       D/w NP Tosha Brooke M.D.  [facility], Division of Infectious Diseases  [phone]  After 5pm on weekdays and all day on weekends - please call [phone]  Available on [employer] TEAMS
69 year old male with hx of T2DM, HTN, HPL, nephrolithiasis, and left kidney/urothelial cancer (s/p carboplatin/gemcitabine, stopped avelumab 1mth ago) complicated by Left sided mild-moderate hydronephrosis for which he had a stent placed (removed and exchange unsuccessful last week) sent by outpt nephrologist Dr. Sen for hyperkalemia (K 6.0), YANIRA (Scr 2.15) and PCN evaluation.       Plan:    # Sepsis:  - BCx negative  - Ua negative for UTI  - CXR w/ clear lungs  - Trend temps and CBS'  - Tylenol prn for fevers  - C/w Iv abx  - ID following    # Hydronephrosis 2/2 to obstruction of ureteropelvic junction (UPJ):  -  Left kidney/urothelial cancer (s/p carboplatin/gemcitabine, stopped avelumab 1mth ago) complicated by Left sided mild-moderate hydronephrosis. Was at [facility] [place] last week where stent exchange was attempted but was ultimately unsuccessful. Previous stent has since been removed.  - CT A/P reviewed  - IR eval -> per IR-> large tumor burden on left kidney; high risk for bleeding and seeding of tumor if percutaneous nephrostomy is placed  - Per Uro-> If patients fever curve not improving, can't clear cultures, decompensates hemodynamically, or YANIRA doesn't resolve then would proceed with PCN placement  - Heme Onc following    # YANIRA/ Hyperkalemia:  - Monitor I/O's  - Serial Cr  - Avoid nephrotoxins  - Renally dose medications  - S/p Lokelma, trend K  - Renal following    # Dm2:  - HgbA1c 6.6  - Continue to monitor blood glucose levels  - Sliding Scale    # HTN:  - Trend BP, currently off BP meds    # GI:  - Bowel regimen prn    # DVT ppx:  - IPC's    [facility]  [phone]                
69 year old male with hx of T2DM, HTN, HPL, nephrolithiasis, and left kidney/urothelial cancer (s/p carboplatin/gemcitabine, stopped avelumab 1mth ago) complicated by Left sided mild-moderate hydronephrosis for which he had a stent placed (removed and exchange unsuccessful last week) sent by outpt nephrologist Dr. Sen for hyperkalemia (K 6.0), YANIRA (Scr 2.15) and PCN evaluation.       Plan:    # Sepsis:  - BCx pending  - Ua negative for UTI  - CXR w/ clear lungs  - Trend temps and CBS'  - Tylenol prn for fevers  - C/w Iv abx  - ID eval pending (called)    # Hydronephrosis 2/2 to obstruction of ureteropelvic junction (UPJ):  -  Left kidney/urothelial cancer (s/p carboplatin/gemcitabine, stopped avelumab 1mth ago) complicated by Left sided mild-moderate hydronephrosis. [place] [facility] [place] exchange was attempted but was ultimately unsuccessful. Previous stent has since been removed.  - CT A/P reviewed  - NPO  - IR eval pending for possible Perc nephrostomy    # YANIRA/ Hyperkalemia:  - Monitor I/O's  - Serial Cr  - Avoid nephrotoxins  - Renally dose medications  - S/p Nova, dionna K  - Renal following    # Dm2:  - HgbA1c  - Continue to monitor blood glucose levels  - Sliding Scale    # HTN:  - Trend BP, currently off BP meds    # GI:  - Bowel regimen prn    # DVT ppx:  - IPC's    [facility]                  
69 year old male with hx of T2DM, HTN, HPL, nephrolithiasis, and left kidney/urothelial cancer (s/p carboplatin/gemcitabine, stopped avelumab 1mth ago) complicated by Left sided mild-moderate hydronephrosis for which he had a stent placed (removed and exchange unsuccessful last week) sent by outpt nephrologist Dr. Sen for hyperkalemia (K 6.0), YANIRA (Scr 2.15) and PCN evaluation.    Metastatic urothelial cancer  - Previously on gem carbo x 6 through 12/2023 -> avelumab since 2/2024 then held since 6/18/24 due to CRI, anemia, dizziness. PET 7/2024 shows PD at multiple sites. Currently on treatment with enfortumab vedotin.   - Follows with Dr. Herring at [facility] [place].  - No plan for treatment while admitted    Anemia   - due to cancer, treatment, CK[place] Ven[place]er on 7/16, 7[place] [facility] [place]  - Iron studies and folate low, start folic acid and iron supplementation. B12 adequate.    YANIRA, PCN evaluation, fever  - Recently stent exchange was [place] but ultimately [place] [facility] [place]  2/2 malignant obstruction, stent since removed  - CT AP: Ill-defined left renal mass, compatible with given history of known urothelial malignancy. Multiple left perinephric tumor deposits with suspected tumor thrombus in the left renal vein, suboptimally assessed without intravenous contrast. Dilated left upper pole renal calyces to the level of the renal pelvis which is likely filled with tumor. Further evaluation with CT urogram can be performed. Several ill-defined hypoattenuating liver lesions suspicious for metastases. Left upper quadrant peritoneal implant.  - Per nephrology, YANIRA due to L hydronephrosis, ARB, NSAID use  - Per IR, large tumor burden on left kidney; high risk for bleeding and seeding of tumor if percutaneous nephrostomy is placed.   - Urology following - monitoring fever curve, if patient decompensates hemodynamically or YANIRA doesn't resolve then would proceed with PCN   - Will continue cefepime per ID, has been on cephalexin since last week. Blood/urine cultures so far negative.  - Febrile overnight 8/3, afebrile 8/4    Will continue to follow.    Buddy Bond PA-C  Hematology/Oncology  [facility]  [phone] (office)
70 y/o male with YANIRA and hyperkalemia.

## 2024-08-05 NOTE — DISCHARGE NOTE PROVIDER - NSDCMRMEDTOKEN_GEN_ALL_CORE_FT
atorvastatin 20 mg oral tablet: 1 tab(s) orally once a day- IN AM  magnesium oxide 400 mg oral capsule: 1 cap(s) orally once a day  metFORMIN 500 mg oral tablet: 1 tab(s) orally once a day  omeprazole 40 mg oral delayed release capsule: 1 cap(s) orally once a day  Trulicity Pen 1.5 mg/0.5 mL subcutaneous solution: 1.5 milligram(s) subcutaneously once a week

## 2024-08-05 NOTE — PROGRESS NOTE ADULT - PROBLEM SELECTOR PLAN 2
resolved s/p Lokelma, has not required since admission  holding ARB, may need to continue holding upon discharge  patient states he has lokelma at home to dose as instructed prn.  advised possible need for low potassium diet upon discharge

## 2024-08-05 NOTE — PROGRESS NOTE ADULT - NSPROGADDITIONALINFOA_GEN_ALL_CORE
Can contact Dr. Trivedi via TEAMS or renal consult fellow if further input needed before 5PM.  After 5PM, please contact nephrology fellow on call

## 2024-08-05 NOTE — PROGRESS NOTE ADULT - PROVIDER SPECIALTY LIST ADULT
Internal Medicine
Heme/Onc
Infectious Disease
Internal Medicine
Heme/Onc
Heme/Onc
Infectious Disease
Internal Medicine
Internal Medicine
Nephrology

## 2024-08-05 NOTE — PROGRESS NOTE ADULT - SUBJECTIVE AND OBJECTIVE BOX
INTERVAL HPI/OVERNIGHT EVENTS:  Patient S&E at bedside. Still had fever last night but reports feeling well. Creatinine not improving. Cefepime discontinued per ID given no infectious source.     VITAL SIGNS:  T(F): 98.9 (08-05-24 @ 04:22)  HR: 84 (08-05-24 @ 04:22)  BP: 130/78 (08-05-24 @ 04:22)  RR: 18 (08-05-24 @ 04:22)  SpO2: 99% (08-05-24 @ 04:22)  Wt(kg): --    PHYSICAL EXAM:    Constitutional: NAD  Eyes: EOMI, sclera non-icteric  Neck: supple, no masses, no JVD  Respiratory: CTA b/l, good air entry b/l  Cardiovascular: RRR, no M/R/G  Gastrointestinal: soft, NTND, no masses palpable, + BS, no hepatosplenomegaly  Extremities: no c/c/e  Neurological: AAOx3      MEDICATIONS  (STANDING):  atorvastatin 20 milliGRAM(s) Oral daily  dextrose 5%. 1000 milliLiter(s) (100 mL/Hr) IV Continuous <Continuous>  dextrose 5%. 1000 milliLiter(s) (50 mL/Hr) IV Continuous <Continuous>  dextrose 50% Injectable 25 Gram(s) IV Push once  dextrose 50% Injectable 25 Gram(s) IV Push once  dextrose 50% Injectable 12.5 Gram(s) IV Push once  ferrous    sulfate 325 milliGRAM(s) Oral daily  folic acid 1 milliGRAM(s) Oral daily  glucagon  Injectable 1 milliGRAM(s) IntraMuscular once  insulin lispro (ADMELOG) corrective regimen sliding scale   SubCutaneous three times a day before meals  pantoprazole    Tablet 40 milliGRAM(s) Oral before breakfast  sodium chloride 0.9%. 1000 milliLiter(s) (75 mL/Hr) IV Continuous <Continuous>    MEDICATIONS  (PRN):  acetaminophen     Tablet .. 650 milliGRAM(s) Oral every 6 hours PRN Temp greater or equal to 38C (100.4F), Mild Pain (1 - 3)  dextrose Oral Gel 15 Gram(s) Oral once PRN Blood Glucose LESS THAN 70 milliGRAM(s)/deciliter      Allergies    No Known Allergies    Intolerances        LABS:                        8.1    16.62 )-----------( 459      ( 05 Aug 2024 06:21 )             25.9     08-05    131<L>  |  97  |  30<H>  ----------------------------<  117<H>  4.7   |  21<L>  |  2.15<H>    Ca    10.0      05 Aug 2024 06:21  Mg     2.0     08-04        Urinalysis Basic - ( 05 Aug 2024 06:21 )    Color: x / Appearance: x / SG: x / pH: x  Gluc: 117 mg/dL / Ketone: x  / Bili: x / Urobili: x   Blood: x / Protein: x / Nitrite: x   Leuk Esterase: x / RBC: x / WBC x   Sq Epi: x / Non Sq Epi: x / Bacteria: x        RADIOLOGY & ADDITIONAL TESTS:  Studies reviewed.
[facility]  [facility] Y. Patel, S. Shah, G. Richard  [phone]  ([phone] - weekdays after 5pm and weekends)    Name: JESSIE GREER  Age/Gender: 69y Male  MRN: [id number]    Interval History:  Patient seen and examined this morning.   No new complaints. Denies feeling fever/chills last night.   Notes reviewed. No concerning overnight events  Wife at bedside.   Allergies: No Known Allergies      Objective:  Vitals:   T(F): 98.9 (08-05-24 @ 04:22), Max: 101.2 (08-04-24 @ 20:06)  HR: 84 (08-05-24 @ 04:22) (84 - 93)  BP: 130/78 (08-05-24 @ 04:22) (120/68 - 130/78)  RR: 18 (08-05-24 @ 04:22) (18 - 18)  SpO2: 99% (08-05-24 @ 04:22) (95% - 99%)  Physical Examination:  General: no acute distress, nontoxic appearing   HEENT: NC/AT, anicteric, EOMI  Respiratory: clear to auscultation bilaterally   Cardiovascular: S1 and S2 present, normal rate   Gastrointestinal: soft, nontender, nondistended  Extremities: no edema, no cyanosis  Skin: no visible rash    Laboratory Studies:  CBC:                       8.1    16.62 )-----------( 459      ( 05 Aug 2024 06:21 )             25.9     WBC Trend:  16.62 08-05-24 @ 06:21  16.79 08-04-24 @ 06:50  15.12 08-03-24 @ 07:35  20.77 08-01-24 @ 20:14    CMP: 08-05    131<L>  |  97  |  30<H>  ----------------------------<  117<H>  4.7   |  21<L>  |  2.15<H>    Ca    10.0      05 Aug 2024 06:21  Mg     2.0     08-04      Creatinine: 2.15 mg/dL (08-05-24 @ 06:21)  Creatinine: 2.06 mg/dL (08-04-24 @ 06:50)  Creatinine: 2.09 mg/dL (08-03-24 @ 06:57)  Creatinine: 1.96 mg/dL (08-02-24 @ 03:40)  Creatinine: 2.02 mg/dL (08-01-24 @ 20:14)    Microbiology: reviewed   Culture - Blood (collected 08-02-24 @ 00:20)  Source: .Blood Blood-Venous  Preliminary Report (08-05-24 @ 06:01):    No growth at 72 Hours    Culture - Blood (collected 08-02-24 @ 00:05)  Source: .Blood Blood-Venous  Preliminary Report (08-05-24 @ 06:01):    No growth at 72 Hours    Culture - Urine (collected 08-01-24 @ 21:15)  Source: Clean Catch Clean Catch (Midstream)  Final Report (08-02-24 @ 23:37):    No growth    SARS-CoV-2 Result: NotDete (01 Aug 2024 22:57)    Radiology: reviewed     Medications:  acetaminophen     Tablet .. 650 milliGRAM(s) Oral every 6 hours PRN  atorvastatin 20 milliGRAM(s) Oral daily  cefepime   IVPB 2000 milliGRAM(s) IV Intermittent every 12 hours  dextrose 5%. 1000 milliLiter(s) IV Continuous <Continuous>  dextrose 5%. 1000 milliLiter(s) IV Continuous <Continuous>  dextrose 50% Injectable 25 Gram(s) IV Push once  dextrose 50% Injectable 25 Gram(s) IV Push once  dextrose 50% Injectable 12.5 Gram(s) IV Push once  dextrose Oral Gel 15 Gram(s) Oral once PRN  ferrous    sulfate 325 milliGRAM(s) Oral daily  folic acid 1 milliGRAM(s) Oral daily  glucagon  Injectable 1 milliGRAM(s) IntraMuscular once  insulin lispro (ADMELOG) corrective regimen sliding scale   SubCutaneous three times a day before meals  pantoprazole    Tablet 40 milliGRAM(s) Oral before breakfast  sodium chloride 0.9%. 1000 milliLiter(s) IV Continuous <Continuous>    Current Antimicrobials:  cefepime   IVPB 2000 milliGRAM(s) IV Intermittent every 12 hours    Prior/Completed Antimicrobials:  cefepime   IVPB  
Patient is a 69y old  Male who presents with a chief complaint of hyperkalemia (K 6.0), YANIRA (Scr 2.15) and PCN evaluation (02 Aug 2024 13:08)    Patient seen and examined at bedside, feels well. No fever overnight.  MEDICATIONS  (STANDING):  atorvastatin 20 milliGRAM(s) Oral daily  cefepime   IVPB 2000 milliGRAM(s) IV Intermittent every 12 hours  dextrose 5%. 1000 milliLiter(s) (50 mL/Hr) IV Continuous <Continuous>  dextrose 5%. 1000 milliLiter(s) (100 mL/Hr) IV Continuous <Continuous>  dextrose 50% Injectable 25 Gram(s) IV Push once  dextrose 50% Injectable 25 Gram(s) IV Push once  dextrose 50% Injectable 12.5 Gram(s) IV Push once  ferrous    sulfate 325 milliGRAM(s) Oral daily  folic acid 1 milliGRAM(s) Oral daily  glucagon  Injectable 1 milliGRAM(s) IntraMuscular once  insulin lispro (ADMELOG) corrective regimen sliding scale   SubCutaneous three times a day before meals  pantoprazole    Tablet 40 milliGRAM(s) Oral before breakfast  sodium chloride 0.9%. 1000 milliLiter(s) (75 mL/Hr) IV Continuous <Continuous>    MEDICATIONS  (PRN):  acetaminophen     Tablet .. 650 milliGRAM(s) Oral every 6 hours PRN Temp greater or equal to 38C (100.4F), Mild Pain (1 - 3)  dextrose Oral Gel 15 Gram(s) Oral once PRN Blood Glucose LESS THAN 70 milliGRAM(s)/deciliter      ROS  No fever, sweats, chills  No epistaxis, HA, sore throat  No CP, SOB, cough, sputum  No n/v/d, abd pain, melena, hematochezia  No edema  No rash  No anxiety  No back pain, joint pain  No bleeding, bruising  No dysuria, hematuria    Vital Signs Last 24 Hrs  T(C): 36.9 (04 Aug 2024 04:28), Max: 37.1 (03 Aug 2024 20:13)  T(F): 98.4 (04 Aug 2024 04:28), Max: 98.7 (03 Aug 2024 20:13)  HR: 83 (04 Aug 2024 04:28) (83 - 93)  BP: 108/69 (04 Aug 2024 04:28) (108/69 - 115/74)  BP(mean): --  RR: 18 (04 Aug 2024 04:28) (18 - 18)  SpO2: 97% (04 Aug 2024 04:28) (97% - 97%)    Parameters below as of 04 Aug 2024 04:28  Patient On (Oxygen Delivery Method): room air        PE  NAD  Awake, alert  Anicteric, MMM  RRR  CTAB  Abd soft, NT, ND  No c/c/e  No rash grossly  FROM                          7.5    16.79 )-----------( 452      ( 04 Aug 2024 06:50 )             25.0       08-04    133<L>  |  97  |  30<H>  ----------------------------<  109<H>  5.1   |  20<L>  |  2.06<H>    Ca    9.6      04 Aug 2024 06:50  Mg     2.0     08-04        
Patient is a 69y old  Male who presents with a chief complaint of hyperkalemia (K 6.0), YANIRA (Scr 2.15) and PCN evaluation (02 Aug 2024 13:08)    Patient seen and examined at bedside, sitting in chair. Wife present. No acute events or new complaints reported.     MEDICATIONS  (STANDING):  atorvastatin 20 milliGRAM(s) Oral daily  cefepime   IVPB 2000 milliGRAM(s) IV Intermittent every 12 hours  dextrose 5%. 1000 milliLiter(s) (100 mL/Hr) IV Continuous <Continuous>  dextrose 5%. 1000 milliLiter(s) (50 mL/Hr) IV Continuous <Continuous>  dextrose 50% Injectable 25 Gram(s) IV Push once  dextrose 50% Injectable 25 Gram(s) IV Push once  dextrose 50% Injectable 12.5 Gram(s) IV Push once  glucagon  Injectable 1 milliGRAM(s) IntraMuscular once  insulin lispro (ADMELOG) corrective regimen sliding scale   SubCutaneous three times a day before meals  pantoprazole    Tablet 40 milliGRAM(s) Oral before breakfast  sodium chloride 0.9%. 1000 milliLiter(s) (75 mL/Hr) IV Continuous <Continuous>    MEDICATIONS  (PRN):  acetaminophen     Tablet .. 650 milliGRAM(s) Oral every 6 hours PRN Temp greater or equal to 38C (100.4F), Mild Pain (1 - 3)  dextrose Oral Gel 15 Gram(s) Oral once PRN Blood Glucose LESS THAN 70 milliGRAM(s)/deciliter      ROS  Febrile overnight  No epistaxis, HA, sore throat  No CP, SOB, cough, sputum  No n/v/d, abd pain, melena, hematochezia  No edema  No rash  No anxiety  No back pain, joint pain  No bleeding, bruising  No dysuria, hematuria    Vital Signs Last 24 Hrs  T(C): 37.3 (03 Aug 2024 04:43), Max: 38.6 (02 Aug 2024 20:54)  T(F): 99.1 (03 Aug 2024 04:43), Max: 101.5 (02 Aug 2024 20:54)  HR: 98 (03 Aug 2024 04:43) (80 - 98)  BP: 117/69 (03 Aug 2024 04:43) (117/69 - 134/80)  BP(mean): --  RR: 18 (03 Aug 2024 04:43) (18 - 18)  SpO2: 98% (03 Aug 2024 04:43) (94% - 98%)    Parameters below as of 03 Aug 2024 04:43  Patient On (Oxygen Delivery Method): room air        PE  NAD  Awake, alert  Anicteric, MMM  RRR  CTAB  Abd soft, NT, ND  No c/c/e  No rash grossly  FROM                          7.5    15.12 )-----------( 455      ( 03 Aug 2024 07:35 )             23.9       08-03    130<L>  |  98  |  31<H>  ----------------------------<  127<H>  4.8   |  20<L>  |  2.09<H>    Ca    9.7      03 Aug 2024 06:57  Mg     2.0     08-01    TPro  8.0  /  Alb  3.3  /  TBili  0.4  /  DBili  x   /  AST  13  /  ALT  25  /  AlkPhos  132<H>  08-01      
SUBJECTIVE / OVERNIGHT EVENTS:      Patient seen and examined at bedside. No events noted overnight. Resting comfortably in bed watch TV this morning      --------------------------------------------------------------------------------------------  LABS:                        7.5    15.12 )-----------( 455      ( 03 Aug 2024 07:35 )             23.9     08-03    130<L>  |  98  |  31<H>  ----------------------------<  127<H>  4.8   |  20<L>  |  2.09<H>    Ca    9.7      03 Aug 2024 06:57        CAPILLARY BLOOD GLUCOSE      POCT Blood Glucose.: 168 mg/dL (03 Aug 2024 22:00)  POCT Blood Glucose.: 150 mg/dL (03 Aug 2024 17:04)  POCT Blood Glucose.: 189 mg/dL (03 Aug 2024 12:11)  POCT Blood Glucose.: 123 mg/dL (03 Aug 2024 08:11)        Urinalysis Basic - ( 03 Aug 2024 06:57 )    Color: x / Appearance: x / SG: x / pH: x  Gluc: 127 mg/dL / Ketone: x  / Bili: x / Urobili: x   Blood: x / Protein: x / Nitrite: x   Leuk Esterase: x / RBC: x / WBC x   Sq Epi: x / Non Sq Epi: x / Bacteria: x        RADIOLOGY & ADDITIONAL TESTS:    Imaging Personally Reviewed:  [x] YES  [ ] NO    Consultant(s) Notes Reviewed:  [x] YES  [ ] NO    MEDICATIONS  (STANDING):  atorvastatin 20 milliGRAM(s) Oral daily  cefepime   IVPB 2000 milliGRAM(s) IV Intermittent every 12 hours  dextrose 5%. 1000 milliLiter(s) (50 mL/Hr) IV Continuous <Continuous>  dextrose 5%. 1000 milliLiter(s) (100 mL/Hr) IV Continuous <Continuous>  dextrose 50% Injectable 25 Gram(s) IV Push once  dextrose 50% Injectable 12.5 Gram(s) IV Push once  dextrose 50% Injectable 25 Gram(s) IV Push once  glucagon  Injectable 1 milliGRAM(s) IntraMuscular once  insulin lispro (ADMELOG) corrective regimen sliding scale   SubCutaneous three times a day before meals  pantoprazole    Tablet 40 milliGRAM(s) Oral before breakfast  sodium chloride 0.9%. 1000 milliLiter(s) (75 mL/Hr) IV Continuous <Continuous>    MEDICATIONS  (PRN):  acetaminophen     Tablet .. 650 milliGRAM(s) Oral every 6 hours PRN Temp greater or equal to 38C (100.4F), Mild Pain (1 - 3)  dextrose Oral Gel 15 Gram(s) Oral once PRN Blood Glucose LESS THAN 70 milliGRAM(s)/deciliter      Care Discussed with Consultants/Other Providers [x] YES  [ ] NO    Vital Signs Last 24 Hrs  T(C): 36.9 (04 Aug 2024 04:28), Max: 37.1 (03 Aug 2024 20:13)  T(F): 98.4 (04 Aug 2024 04:28), Max: 98.7 (03 Aug 2024 20:13)  HR: 83 (04 Aug 2024 04:28) (83 - 111)  BP: 108/69 (04 Aug 2024 04:28) (108/69 - 119/70)  BP(mean): --  RR: 18 (04 Aug 2024 04:28) (18 - 18)  SpO2: 97% (04 Aug 2024 04:28) (96% - 97%)    Parameters below as of 04 Aug 2024 04:28  Patient On (Oxygen Delivery Method): room air      I&O's Summary    02 Aug 2024 07:01  -  03 Aug 2024 07:00  --------------------------------------------------------  IN: 0 mL / OUT: 1150 mL / NET: -1150 mL    03 Aug 2024 07:01  -  04 Aug 2024 06:25  --------------------------------------------------------  IN: 1200 mL / OUT: 1480 mL / NET: -280 mL      PHYSICAL EXAM:  GENERAL: NAD, well-developed, comfortable  HEAD:  Atraumatic, Normocephalic  EYES: EOMI, PERRLA, conjunctiva and sclera clear  NECK: Supple, No JVD  CHEST/LUNG: Clear to auscultation bilaterally; No wheeze  HEART: Regular rate and rhythm; No murmurs, rubs, or gallops  ABDOMEN: Soft, Nontender, Nondistended; Bowel sounds present  NEURO: AAOx3, no focal weakness, 5/5 b/l extremity strength, b/l knee no arthritis, no effusion   EXTREMITIES:  2+ Peripheral Pulses, No clubbing, cyanosis, or edema  SKIN: No rashes or lesions    
SUBJECTIVE / OVERNIGHT EVENTS:      Patient seen and examined at bedside in ED orange. Family at bedside. No events noted overnight. Resting in bed      --------------------------------------------------------------------------------------------  LABS:                        8.4    20.77 )-----------( 555      ( 01 Aug 2024 20:14 )             28.5     08-02    131<L>  |  97  |  32<H>  ----------------------------<  123<H>  4.7   |  20<L>  |  1.96<H>    Ca    9.3      02 Aug 2024 03:40  Mg     2.0     08-01    TPro  8.0  /  Alb  3.3  /  TBili  0.4  /  DBili  x   /  AST  13  /  ALT  25  /  AlkPhos  132<H>  08-01    PT/INR - ( 01 Aug 2024 20:14 )   PT: 14.5 sec;   INR: 1.33 ratio         PTT - ( 01 Aug 2024 20:14 )  PTT:29.9 sec  CAPILLARY BLOOD GLUCOSE      POCT Blood Glucose.: 123 mg/dL (02 Aug 2024 07:57)        Urinalysis Basic - ( 02 Aug 2024 03:40 )    Color: x / Appearance: x / SG: x / pH: x  Gluc: 123 mg/dL / Ketone: x  / Bili: x / Urobili: x   Blood: x / Protein: x / Nitrite: x   Leuk Esterase: x / RBC: x / WBC x   Sq Epi: x / Non Sq Epi: x / Bacteria: x        RADIOLOGY & ADDITIONAL TESTS: < from: CT Abdomen and Pelvis No Cont (08.02.24 @ 00:21) >  IMPRESSION:  1.   9 cm left renal mass, presumably malignant.  2.   There is left renal hilar metastatic lymphadenopathy.  3.   There is mild-to-moderate hydronephrosis of the left kidney, without  hydroureter, likely due to obstruction of the left UPJ by compression or   direct  invasion of the aforementioned 9 cm left renal mass.  4.   There are multiple metastatic nodules in the left perinephric fat,  measuring up to 4.3 cm.  5.   There is a 3.5 cm metastasis at splenic hilum.  6.   7.5 cm low-intermediate density exophytic round right renal   structure with  suggestion ofa small eccentric nodular area at its interface with the   kidney  (image 53/series 301) is indeterminate, potentially malignant.  7.   Multiple hepatic metastases.    < end of copied text >  < from: Xray Chest 1 View- PORTABLE-Urgent (Xray Chest 1 View- PORTABLE-Urgent .) (08.01.24 @ 23:14) >    IMPRESSION:    Clear lungs.    < end of copied text >      Imaging Personally Reviewed:  [x] YES  [ ] NO    Consultant(s) Notes Reviewed:  [x] YES  [ ] NO    MEDICATIONS  (STANDING):  atorvastatin 20 milliGRAM(s) Oral daily  cefepime   IVPB 2000 milliGRAM(s) IV Intermittent every 12 hours  dextrose 5%. 1000 milliLiter(s) (50 mL/Hr) IV Continuous <Continuous>  dextrose 5%. 1000 milliLiter(s) (100 mL/Hr) IV Continuous <Continuous>  dextrose 50% Injectable 12.5 Gram(s) IV Push once  dextrose 50% Injectable 25 Gram(s) IV Push once  dextrose 50% Injectable 25 Gram(s) IV Push once  glucagon  Injectable 1 milliGRAM(s) IntraMuscular once  insulin lispro (ADMELOG) corrective regimen sliding scale   SubCutaneous every 6 hours  pantoprazole    Tablet 40 milliGRAM(s) Oral before breakfast  sodium chloride 0.9%. 1000 milliLiter(s) (75 mL/Hr) IV Continuous <Continuous>    MEDICATIONS  (PRN):  acetaminophen     Tablet .. 650 milliGRAM(s) Oral every 6 hours PRN Temp greater or equal to 38C (100.4F), Mild Pain (1 - 3)  dextrose Oral Gel 15 Gram(s) Oral once PRN Blood Glucose LESS THAN 70 milliGRAM(s)/deciliter      Care Discussed with Consultants/Other Providers [x] YES  [ ] NO    Vital Signs Last 24 Hrs  T(C): 37.2 (02 Aug 2024 05:19), Max: 38.5 (01 Aug 2024 22:15)  T(F): 99 (02 Aug 2024 05:19), Max: 101.3 (01 Aug 2024 22:15)  HR: 91 (02 Aug 2024 05:19) (87 - 98)  BP: 116/71 (02 Aug 2024 05:19) (116/71 - 129/80)  BP(mean): 89 (02 Aug 2024 03:43) (89 - 91)  RR: 18 (02 Aug 2024 05:19) (16 - 18)  SpO2: 96% (02 Aug 2024 05:19) (96% - 99%)    Parameters below as of 02 Aug 2024 05:19  Patient On (Oxygen Delivery Method): room air      I&O's Summary      PHYSICAL EXAM:  GENERAL: NAD, well-developed, comfortable  HEAD:  Atraumatic, Normocephalic  EYES: EOMI, PERRLA, conjunctiva and sclera clear  NECK: Supple, No JVD  CHEST/LUNG: Clear to auscultation bilaterally; No wheeze  HEART: Regular rate and rhythm; No murmurs, rubs, or gallops  ABDOMEN: Soft, Nontender, Nondistended; Bowel sounds present  NEURO: AAOx3, no focal weakness, 5/5 b/l extremity strength, b/l knee no arthritis, no effusion   EXTREMITIES:  2+ Peripheral Pulses, No clubbing, cyanosis, or edema  SKIN: No rashes or lesions    
SUBJECTIVE / OVERNIGHT EVENTS:      Patient seen and examined at bedside. Febrile overnight. Resting comfortably in bed w/ family at bedside.      --------------------------------------------------------------------------------------------  LABS:                        8.1    16.62 )-----------( 459      ( 05 Aug 2024 06:21 )             25.9     08-05    131<L>  |  97  |  30<H>  ----------------------------<  117<H>  4.7   |  21<L>  |  2.15<H>    Ca    10.0      05 Aug 2024 06:21  Mg     2.0     08-04        CAPILLARY BLOOD GLUCOSE      POCT Blood Glucose.: 133 mg/dL (05 Aug 2024 07:57)  POCT Blood Glucose.: 147 mg/dL (04 Aug 2024 21:23)  POCT Blood Glucose.: 132 mg/dL (04 Aug 2024 17:10)  POCT Blood Glucose.: 139 mg/dL (04 Aug 2024 11:50)        Urinalysis Basic - ( 05 Aug 2024 06:21 )    Color: x / Appearance: x / SG: x / pH: x  Gluc: 117 mg/dL / Ketone: x  / Bili: x / Urobili: x   Blood: x / Protein: x / Nitrite: x   Leuk Esterase: x / RBC: x / WBC x   Sq Epi: x / Non Sq Epi: x / Bacteria: x        RADIOLOGY & ADDITIONAL TESTS:    Imaging Personally Reviewed:  [x] YES  [ ] NO    Consultant(s) Notes Reviewed:  [x] YES  [ ] NO    MEDICATIONS  (STANDING):  atorvastatin 20 milliGRAM(s) Oral daily  cefepime   IVPB 2000 milliGRAM(s) IV Intermittent every 12 hours  dextrose 5%. 1000 milliLiter(s) (50 mL/Hr) IV Continuous <Continuous>  dextrose 5%. 1000 milliLiter(s) (100 mL/Hr) IV Continuous <Continuous>  dextrose 50% Injectable 25 Gram(s) IV Push once  dextrose 50% Injectable 25 Gram(s) IV Push once  dextrose 50% Injectable 12.5 Gram(s) IV Push once  ferrous    sulfate 325 milliGRAM(s) Oral daily  folic acid 1 milliGRAM(s) Oral daily  glucagon  Injectable 1 milliGRAM(s) IntraMuscular once  insulin lispro (ADMELOG) corrective regimen sliding scale   SubCutaneous three times a day before meals  pantoprazole    Tablet 40 milliGRAM(s) Oral before breakfast  sodium chloride 0.9%. 1000 milliLiter(s) (75 mL/Hr) IV Continuous <Continuous>    MEDICATIONS  (PRN):  acetaminophen     Tablet .. 650 milliGRAM(s) Oral every 6 hours PRN Temp greater or equal to 38C (100.4F), Mild Pain (1 - 3)  dextrose Oral Gel 15 Gram(s) Oral once PRN Blood Glucose LESS THAN 70 milliGRAM(s)/deciliter      Care Discussed with Consultants/Other Providers [x] YES  [ ] NO    Vital Signs Last 24 Hrs  T(C): 37.2 (05 Aug 2024 04:22), Max: 38.4 (04 Aug 2024 20:06)  T(F): 98.9 (05 Aug 2024 04:22), Max: 101.2 (04 Aug 2024 20:06)  HR: 84 (05 Aug 2024 04:22) (84 - 93)  BP: 130/78 (05 Aug 2024 04:22) (120/68 - 130/78)  BP(mean): --  RR: 18 (05 Aug 2024 04:22) (18 - 18)  SpO2: 99% (05 Aug 2024 04:22) (95% - 99%)    Parameters below as of 05 Aug 2024 04:22  Patient On (Oxygen Delivery Method): room air      I&O's Summary    04 Aug 2024 07:01  -  05 Aug 2024 07:00  --------------------------------------------------------  IN: 1100 mL / OUT: 2070 mL / NET: -970 mL        PHYSICAL EXAM:  GENERAL: NAD, well-developed, comfortable  HEAD:  Atraumatic, Normocephalic  EYES: EOMI, PERRLA, conjunctiva and sclera clear  NECK: Supple, No JVD  CHEST/LUNG: Clear to auscultation bilaterally; No wheeze  HEART: Regular rate and rhythm; No murmurs, rubs, or gallops  ABDOMEN: Soft, Nontender, Nondistended; Bowel sounds present  NEURO: AAOx3, no focal weakness, 5/5 b/l extremity strength, b/l knee no arthritis, no effusion   EXTREMITIES:  2+ Peripheral Pulses, No clubbing, cyanosis, or edema  SKIN: No rashes or lesions    
SUBJECTIVE / OVERNIGHT EVENTS:    Patient seen and examined at bedside. No events noted overnight. Resting comfortably in bed. Family at bedside        --------------------------------------------------------------------------------------------  LABS:                        7.5    15.12 )-----------( 455      ( 03 Aug 2024 07:35 )             23.9     08-03    130<L>  |  98  |  31<H>  ----------------------------<  127<H>  4.8   |  20<L>  |  2.09<H>    Ca    9.7      03 Aug 2024 06:57  Mg     2.0     08-01    TPro  8.0  /  Alb  3.3  /  TBili  0.4  /  DBili  x   /  AST  13  /  ALT  25  /  AlkPhos  132<H>  08-01    PT/INR - ( 01 Aug 2024 20:14 )   PT: 14.5 sec;   INR: 1.33 ratio         PTT - ( 01 Aug 2024 20:14 )  PTT:29.9 sec  CAPILLARY BLOOD GLUCOSE      POCT Blood Glucose.: 123 mg/dL (03 Aug 2024 08:11)  POCT Blood Glucose.: 138 mg/dL (02 Aug 2024 17:33)  POCT Blood Glucose.: 124 mg/dL (02 Aug 2024 11:35)        Urinalysis Basic - ( 03 Aug 2024 06:57 )    Color: x / Appearance: x / SG: x / pH: x  Gluc: 127 mg/dL / Ketone: x  / Bili: x / Urobili: x   Blood: x / Protein: x / Nitrite: x   Leuk Esterase: x / RBC: x / WBC x   Sq Epi: x / Non Sq Epi: x / Bacteria: x        RADIOLOGY & ADDITIONAL TESTS:    Imaging Personally Reviewed:  [x] YES  [ ] NO    Consultant(s) Notes Reviewed:  [x] YES  [ ] NO    MEDICATIONS  (STANDING):  atorvastatin 20 milliGRAM(s) Oral daily  cefepime   IVPB 2000 milliGRAM(s) IV Intermittent every 12 hours  dextrose 5%. 1000 milliLiter(s) (100 mL/Hr) IV Continuous <Continuous>  dextrose 5%. 1000 milliLiter(s) (50 mL/Hr) IV Continuous <Continuous>  dextrose 50% Injectable 25 Gram(s) IV Push once  dextrose 50% Injectable 12.5 Gram(s) IV Push once  dextrose 50% Injectable 25 Gram(s) IV Push once  glucagon  Injectable 1 milliGRAM(s) IntraMuscular once  insulin lispro (ADMELOG) corrective regimen sliding scale   SubCutaneous three times a day before meals  pantoprazole    Tablet 40 milliGRAM(s) Oral before breakfast  sodium chloride 0.9%. 1000 milliLiter(s) (75 mL/Hr) IV Continuous <Continuous>    MEDICATIONS  (PRN):  acetaminophen     Tablet .. 650 milliGRAM(s) Oral every 6 hours PRN Temp greater or equal to 38C (100.4F), Mild Pain (1 - 3)  dextrose Oral Gel 15 Gram(s) Oral once PRN Blood Glucose LESS THAN 70 milliGRAM(s)/deciliter      Care Discussed with Consultants/Other Providers [x] YES  [ ] NO    Vital Signs Last 24 Hrs  T(C): 37.3 (03 Aug 2024 04:43), Max: 38.6 (02 Aug 2024 20:54)  T(F): 99.1 (03 Aug 2024 04:43), Max: 101.5 (02 Aug 2024 20:54)  HR: 98 (03 Aug 2024 04:43) (80 - 98)  BP: 117/69 (03 Aug 2024 04:43) (117/69 - 134/80)  BP(mean): --  RR: 18 (03 Aug 2024 04:43) (18 - 18)  SpO2: 98% (03 Aug 2024 04:43) (94% - 98%)    Parameters below as of 03 Aug 2024 04:43  Patient On (Oxygen Delivery Method): room air      I&O's Summary    02 Aug 2024 07:01  -  03 Aug 2024 07:00  --------------------------------------------------------  IN: 0 mL / OUT: 1150 mL / NET: -1150 mL    03 Aug 2024 07:01  -  03 Aug 2024 09:36  --------------------------------------------------------  IN: 480 mL / OUT: 0 mL / NET: 480 mL      PHYSICAL EXAM:  GENERAL: NAD, well-developed, comfortable  HEAD:  Atraumatic, Normocephalic  EYES: EOMI, PERRLA, conjunctiva and sclera clear  NECK: Supple, No JVD  CHEST/LUNG: Clear to auscultation bilaterally; No wheeze  HEART: Regular rate and rhythm; No murmurs, rubs, or gallops  ABDOMEN: Soft, Nontender, Nondistended; Bowel sounds present  NEURO: AAOx3, no focal weakness, 5/5 b/l extremity strength, b/l knee no arthritis, no effusion   EXTREMITIES:  2+ Peripheral Pulses, No clubbing, cyanosis, or edema  SKIN: No rashes or lesions  
[facility] DIVISION of INFECTIOUS DISEASE  Michael Clements MD PhD, Meseret Gardner MD, Carol Pineda MD, Talib Brooke MD, Chaz Ramos MD  and providing coverage with Elsa Edmond MD  Providing Infectious Disease Consultations at [facility], [facility], [place], [facility], [facility]'s    Office# [phone] to schedule follow up appointments  Answering Service for urgent calls or New Consults [phone]  Cell# to text for urgent issues Michael Clements [phone]     infectious diseases progress note:    JESSIE GREER is a 69y y. o. Male patient    Overnight and events of the last 24hrs reviewed    Allergies    No Known Allergies    Intolerances        ANTIBIOTICS/RELEVANT:  antimicrobials  cefepime   IVPB 2000 milliGRAM(s) IV Intermittent every 12 hours    immunologic:    OTHER:  acetaminophen     Tablet .. 650 milliGRAM(s) Oral every 6 hours PRN  atorvastatin 20 milliGRAM(s) Oral daily  dextrose 5%. 1000 milliLiter(s) IV Continuous <Continuous>  dextrose 5%. 1000 milliLiter(s) IV Continuous <Continuous>  dextrose 50% Injectable 25 Gram(s) IV Push once  dextrose 50% Injectable 25 Gram(s) IV Push once  dextrose 50% Injectable 12.5 Gram(s) IV Push once  dextrose Oral Gel 15 Gram(s) Oral once PRN  glucagon  Injectable 1 milliGRAM(s) IntraMuscular once  insulin lispro (ADMELOG) corrective regimen sliding scale   SubCutaneous three times a day before meals  pantoprazole    Tablet 40 milliGRAM(s) Oral before breakfast  sodium chloride 0.9%. 1000 milliLiter(s) IV Continuous <Continuous>      Objective:  Vital Signs Last 24 Hrs  T(C): 36.8 (03 Aug 2024 11:40), Max: 38.6 (02 Aug 2024 20:54)  T(F): 98.3 (03 Aug 2024 11:40), Max: 101.5 (02 Aug 2024 20:54)  HR: 111 (03 Aug 2024 11:40) (96 - 111)  BP: 119/70 (03 Aug 2024 11:40) (117/69 - 134/80)  BP(mean): --  RR: 18 (03 Aug 2024 11:40) (18 - 18)  SpO2: 96% (03 Aug 2024 11:40) (94% - 98%)    Parameters below as of 03 Aug 2024 11:40  Patient On (Oxygen Delivery Method): room air        T(C): 36.8 (08-03-24 @ 11:40), Max: 38.6 (08-02-24 @ 20:54)  T(C): 36.8 (08-03-24 @ 11:40), Max: 38.6 (08-02-24 @ 20:54)  T(C): 36.8 (08-03-24 @ 11:40), Max: 38.6 (08-02-24 @ 20:54)    PHYSICAL EXAM:  HEENT: NC atraumatic  Neck: supple  Respiratory: no accessory muscle use, breathing comfortably  Cardiovascular: distant  Gastrointestinal: normal appearing, nondistended  Extremities: no clubbing, no cyanosis,        LABS:                          7.5    15.12 )-----------( 455      ( 03 Aug 2024 07:35 )             23.9       WBC  15.12 08-03 @ 07:35  20.77 08-01 @ 20:14      08-03    130<L>  |  98  |  31<H>  ----------------------------<  127<H>  4.8   |  20<L>  |  2.09<H>    Ca    9.7      03 Aug 2024 06:57  Mg     2.0     08-01    TPro  8.0  /  Alb  3.3  /  TBili  0.4  /  DBili  x   /  AST  13  /  ALT  25  /  AlkPhos  132<H>  08-01      Creatinine: 2.09 mg/dL (08-03-24 @ 06:57)  Creatinine: 1.96 mg/dL (08-02-24 @ 03:40)  Creatinine: 2.02 mg/dL (08-01-24 @ 20:14)      PT/INR - ( 01 Aug 2024 20:14 )   PT: 14.5 sec;   INR: 1.33 ratio         PTT - ( 01 Aug 2024 20:14 )  PTT:29.9 sec  Urinalysis Basic - ( 03 Aug 2024 06:57 )    Color: x / Appearance: x / SG: x / pH: x  Gluc: 127 mg/dL / Ketone: x  / Bili: x / Urobili: x   Blood: x / Protein: x / Nitrite: x   Leuk Esterase: x / RBC: x / WBC x   Sq Epi: x / Non Sq Epi: x / Bacteria: x            INFLAMMATORY MARKERS      MICROBIOLOGY:              RADIOLOGY & ADDITIONAL STUDIES:  
[facility] DIVISION OF KIDNEY DISEASE AND HYPERTENSION  [phone]    RENAL FOLLOW UP [facility] NEPHROLOGY CONSULT SERVICE  --------------------------------------------------------------------------------  Patient seen and examined this morning with family at bedside.  Chart reviewed and recurrent fevers noted.  ID follow-up noted    PAST HISTORY  --------------------------------------------------------------------------------  No significant changes to PMH, PSH, FHx, SHx, unless otherwise noted    ALLERGIES & MEDICATIONS  --------------------------------------------------------------------------------  Allergies    No Known Allergies    Intolerances      Standing Inpatient Medications  atorvastatin 20 milliGRAM(s) Oral daily  dextrose 5%. 1000 milliLiter(s) IV Continuous <Continuous>  dextrose 5%. 1000 milliLiter(s) IV Continuous <Continuous>  dextrose 50% Injectable 25 Gram(s) IV Push once  dextrose 50% Injectable 25 Gram(s) IV Push once  dextrose 50% Injectable 12.5 Gram(s) IV Push once  ferrous    sulfate 325 milliGRAM(s) Oral daily  folic acid 1 milliGRAM(s) Oral daily  glucagon  Injectable 1 milliGRAM(s) IntraMuscular once  insulin lispro (ADMELOG) corrective regimen sliding scale   SubCutaneous three times a day before meals  pantoprazole    Tablet 40 milliGRAM(s) Oral before breakfast  sodium chloride 0.9%. 1000 milliLiter(s) IV Continuous <Continuous>    PRN Inpatient Medications  acetaminophen     Tablet .. 650 milliGRAM(s) Oral every 6 hours PRN  dextrose Oral Gel 15 Gram(s) Oral once PRN      FOCUSED REVIEW OF SYSTEMS  --------------------------------------------------------------------------------  + fevers/sweats  denies CP/palpitations  denies SOB/cough  denies N/V/Abd pain  +intermittent L flank pain. denies dysuria/hematuria      VITALS/PHYSICAL EXAM  --------------------------------------------------------------------------------  T(C): 37.2 (08-05-24 @ 04:22), Max: 38.4 (08-04-24 @ 20:06)  HR: 84 (08-05-24 @ 04:22) (84 - 93)  BP: 130/78 (08-05-24 @ 04:22) (120/68 - 130/78)  RR: 18 (08-05-24 @ 04:22) (18 - 18)  SpO2: 99% (08-05-24 @ 04:22) (95% - 99%)  Wt(kg): --        08-04-24 @ 07:01  -  08-05-24 @ 07:00  --------------------------------------------------------  IN: 1100 mL / OUT: 2070 mL / NET: -970 mL    08-05-24 @ 07:01  -  08-05-24 @ 10:55  --------------------------------------------------------  IN: 240 mL / OUT: 250 mL / NET: -10 mL      Physical Exam:  	Gen: NAD, sitting up in bed  	Pulm: CTA B/L ant/lat fields  	CV: RRR, S1S2  	Abd: +BS, soft, nontender/nondistended  	: No suprapubic tenderness.  no nolen          Extremity: No LE edema  	Neuro: A&O x 3      LABS/STUDIES  --------------------------------------------------------------------------------              8.1    16.62 >-----------<  459      [08-05-24 @ 06:21]              25.9     131  |  97  |  30  ----------------------------<  117      [08-05-24 @ 06:21]  4.7   |  21  |  2.15        Ca     10.0     [08-05-24 @ 06:21]      Mg     2.0     [08-04-24 @ 06:50]              Creatinine Trend:  SCr 2.15 [08-05 @ 06:21]  SCr 2.06 [08-04 @ 06:50]  SCr 2.09 [08-03 @ 06:57]  SCr 1.96 [08-02 @ 03:40]  SCr 2.02 [08-01 @ 20:14]              Urinalysis - [08-05-24 @ 06:21]      Color  / Appearance  / SG  / pH       Gluc 117 / Ketone   / Bili  / Urobili        Blood  / Protein  / Leuk Est  / Nitrite       RBC  / WBC  / Hyaline  / Gran  / Sq Epi  / Non Sq Epi  / Bacteria       Iron 13, TIBC 141, %sat 9      [08-03-24 @ 06:57]  Ferritin 1457      [08-03-24 @ 07:05]

## 2024-08-06 ENCOUNTER — NON-APPOINTMENT (OUTPATIENT)
Age: 69
End: 2024-08-06

## 2024-08-06 PROBLEM — N17.9 AKI (ACUTE KIDNEY INJURY): Status: ACTIVE | Noted: 2024-08-01

## 2024-08-07 ENCOUNTER — OUTPATIENT (OUTPATIENT)
Dept: OUTPATIENT SERVICES | Facility: HOSPITAL | Age: 69
LOS: 1 days | Discharge: HOME | End: 2024-08-07

## 2024-08-07 ENCOUNTER — INPATIENT (INPATIENT)
Facility: HOSPITAL | Age: 69
LOS: 1 days | Discharge: HOME | DRG: 687 | End: 2024-08-09
Attending: STUDENT IN AN ORGANIZED HEALTH CARE EDUCATION/TRAINING PROGRAM | Admitting: STUDENT IN AN ORGANIZED HEALTH CARE EDUCATION/TRAINING PROGRAM
Payer: MEDICARE

## 2024-08-07 VITALS
HEIGHT: 70 IN | RESPIRATION RATE: 24 BRPM | WEIGHT: 210.1 LBS | TEMPERATURE: 100 F | OXYGEN SATURATION: 98 % | DIASTOLIC BLOOD PRESSURE: 68 MMHG | SYSTOLIC BLOOD PRESSURE: 110 MMHG | HEART RATE: 117 BPM

## 2024-08-07 DIAGNOSIS — Z98.890 OTHER SPECIFIED POSTPROCEDURAL STATES: Chronic | ICD-10-CM

## 2024-08-07 DIAGNOSIS — C64.9 MALIGNANT NEOPLASM OF UNSPECIFIED KIDNEY, EXCEPT RENAL PELVIS: ICD-10-CM

## 2024-08-07 DIAGNOSIS — D72.829 ELEVATED WHITE BLOOD CELL COUNT, UNSPECIFIED: ICD-10-CM

## 2024-08-07 LAB
ALBUMIN SERPL ELPH-MCNC: 2.8 G/DL — LOW (ref 3.3–5)
ALP SERPL-CCNC: 174 U/L — HIGH (ref 40–120)
ALT FLD-CCNC: 35 U/L — SIGNIFICANT CHANGE UP (ref 10–45)
ANION GAP SERPL CALC-SCNC: 18 MMOL/L — HIGH (ref 5–17)
APPEARANCE UR: CLEAR — SIGNIFICANT CHANGE UP
APTT BLD: 27.9 SEC — SIGNIFICANT CHANGE UP (ref 24.5–35.6)
AST SERPL-CCNC: 24 U/L — SIGNIFICANT CHANGE UP (ref 10–40)
BACTERIA # UR AUTO: NEGATIVE /HPF — SIGNIFICANT CHANGE UP
BASE EXCESS BLDV CALC-SCNC: -4.2 MMOL/L — LOW (ref -2–3)
BASE EXCESS BLDV CALC-SCNC: -5.1 MMOL/L — LOW (ref -2–3)
BASOPHILS # BLD AUTO: 0.06 K/UL — SIGNIFICANT CHANGE UP (ref 0–0.2)
BASOPHILS NFR BLD AUTO: 0.3 % — SIGNIFICANT CHANGE UP (ref 0–2)
BILIRUB SERPL-MCNC: 0.4 MG/DL — SIGNIFICANT CHANGE UP (ref 0.2–1.2)
BILIRUB UR-MCNC: NEGATIVE — SIGNIFICANT CHANGE UP
BUN SERPL-MCNC: 36 MG/DL — HIGH (ref 7–23)
CA-I SERPL-SCNC: 1.29 MMOL/L — SIGNIFICANT CHANGE UP (ref 1.15–1.33)
CA-I SERPL-SCNC: 1.39 MMOL/L — HIGH (ref 1.15–1.33)
CALCIUM SERPL-MCNC: 10 MG/DL — SIGNIFICANT CHANGE UP (ref 8.4–10.5)
CAST: 1 /LPF — SIGNIFICANT CHANGE UP (ref 0–4)
CHLORIDE BLDV-SCNC: 100 MMOL/L — SIGNIFICANT CHANGE UP (ref 96–108)
CHLORIDE BLDV-SCNC: 101 MMOL/L — SIGNIFICANT CHANGE UP (ref 96–108)
CHLORIDE SERPL-SCNC: 94 MMOL/L — LOW (ref 96–108)
CO2 BLDV-SCNC: 22 MMOL/L — SIGNIFICANT CHANGE UP (ref 22–26)
CO2 BLDV-SCNC: 23 MMOL/L — SIGNIFICANT CHANGE UP (ref 22–26)
CO2 SERPL-SCNC: 16 MMOL/L — LOW (ref 22–31)
COLOR SPEC: YELLOW — SIGNIFICANT CHANGE UP
CREAT SERPL-MCNC: 2.13 MG/DL — HIGH (ref 0.5–1.3)
CULTURE RESULTS: SIGNIFICANT CHANGE UP
CULTURE RESULTS: SIGNIFICANT CHANGE UP
DIFF PNL FLD: NEGATIVE — SIGNIFICANT CHANGE UP
EGFR: 33 ML/MIN/1.73M2 — LOW
EOSINOPHIL # BLD AUTO: 0.13 K/UL — SIGNIFICANT CHANGE UP (ref 0–0.5)
EOSINOPHIL NFR BLD AUTO: 0.7 % — SIGNIFICANT CHANGE UP (ref 0–6)
FLUAV AG NPH QL: SIGNIFICANT CHANGE UP
FLUBV AG NPH QL: SIGNIFICANT CHANGE UP
GAS PNL BLDV: 129 MMOL/L — LOW (ref 136–145)
GAS PNL BLDV: 130 MMOL/L — LOW (ref 136–145)
GAS PNL BLDV: SIGNIFICANT CHANGE UP
GLUCOSE BLDC GLUCOMTR-MCNC: 168 MG/DL — HIGH (ref 70–99)
GLUCOSE BLDV-MCNC: 216 MG/DL — HIGH (ref 70–99)
GLUCOSE BLDV-MCNC: 93 MG/DL — SIGNIFICANT CHANGE UP (ref 70–99)
GLUCOSE SERPL-MCNC: 206 MG/DL — HIGH (ref 70–99)
GLUCOSE UR QL: NEGATIVE MG/DL — SIGNIFICANT CHANGE UP
HCO3 BLDV-SCNC: 21 MMOL/L — LOW (ref 22–29)
HCO3 BLDV-SCNC: 22 MMOL/L — SIGNIFICANT CHANGE UP (ref 22–29)
HCT VFR BLD CALC: 27 % — LOW (ref 39–50)
HCT VFR BLDA CALC: 23 % — LOW (ref 39–51)
HCT VFR BLDA CALC: 26 % — LOW (ref 39–51)
HGB BLD CALC-MCNC: 7.8 G/DL — LOW (ref 12.6–17.4)
HGB BLD CALC-MCNC: 8.6 G/DL — LOW (ref 12.6–17.4)
HGB BLD-MCNC: 8.1 G/DL — LOW (ref 13–17)
IMM GRANULOCYTES NFR BLD AUTO: 1.2 % — HIGH (ref 0–0.9)
INR BLD: 1.41 RATIO — HIGH (ref 0.85–1.18)
KETONES UR-MCNC: NEGATIVE MG/DL — SIGNIFICANT CHANGE UP
LACTATE BLDV-MCNC: 1.8 MMOL/L — SIGNIFICANT CHANGE UP (ref 0.5–2)
LACTATE BLDV-MCNC: 3.6 MMOL/L — HIGH (ref 0.5–2)
LEUKOCYTE ESTERASE UR-ACNC: NEGATIVE — SIGNIFICANT CHANGE UP
LYMPHOCYTES # BLD AUTO: 1.91 K/UL — SIGNIFICANT CHANGE UP (ref 1–3.3)
LYMPHOCYTES # BLD AUTO: 9.6 % — LOW (ref 13–44)
MCHC RBC-ENTMCNC: 24 PG — LOW (ref 27–34)
MCHC RBC-ENTMCNC: 30 GM/DL — LOW (ref 32–36)
MCV RBC AUTO: 79.9 FL — LOW (ref 80–100)
MONOCYTES # BLD AUTO: 1.38 K/UL — HIGH (ref 0–0.9)
MONOCYTES NFR BLD AUTO: 7 % — SIGNIFICANT CHANGE UP (ref 2–14)
NEUTROPHILS # BLD AUTO: 16.09 K/UL — HIGH (ref 1.8–7.4)
NEUTROPHILS NFR BLD AUTO: 81.2 % — HIGH (ref 43–77)
NITRITE UR-MCNC: NEGATIVE — SIGNIFICANT CHANGE UP
NRBC # BLD: 0 /100 WBCS — SIGNIFICANT CHANGE UP (ref 0–0)
OSMOLALITY UR: 513 MOS/KG — SIGNIFICANT CHANGE UP (ref 300–900)
PCO2 BLDV: 40 MMHG — LOW (ref 42–55)
PCO2 BLDV: 42 MMHG — SIGNIFICANT CHANGE UP (ref 42–55)
PH BLDV: 7.32 — SIGNIFICANT CHANGE UP (ref 7.32–7.43)
PH BLDV: 7.32 — SIGNIFICANT CHANGE UP (ref 7.32–7.43)
PH UR: 5.5 — SIGNIFICANT CHANGE UP (ref 5–8)
PLATELET # BLD AUTO: 466 K/UL — HIGH (ref 150–400)
PO2 BLDV: 25 MMHG — SIGNIFICANT CHANGE UP (ref 25–45)
PO2 BLDV: 40 MMHG — SIGNIFICANT CHANGE UP (ref 25–45)
POTASSIUM BLDV-SCNC: 4.1 MMOL/L — SIGNIFICANT CHANGE UP (ref 3.5–5.1)
POTASSIUM BLDV-SCNC: 5.2 MMOL/L — HIGH (ref 3.5–5.1)
POTASSIUM SERPL-MCNC: 4.1 MMOL/L — SIGNIFICANT CHANGE UP (ref 3.5–5.3)
POTASSIUM SERPL-SCNC: 4.1 MMOL/L — SIGNIFICANT CHANGE UP (ref 3.5–5.3)
PROT SERPL-MCNC: 7.9 G/DL — SIGNIFICANT CHANGE UP (ref 6–8.3)
PROT UR-MCNC: 30 MG/DL
PROTHROM AB SERPL-ACNC: 15.4 SEC — HIGH (ref 9.5–13)
RBC # BLD: 3.38 M/UL — LOW (ref 4.2–5.8)
RBC # FLD: 17.4 % — HIGH (ref 10.3–14.5)
RBC CASTS # UR COMP ASSIST: 0 /HPF — SIGNIFICANT CHANGE UP (ref 0–4)
RSV RNA NPH QL NAA+NON-PROBE: SIGNIFICANT CHANGE UP
SAO2 % BLDV: 33.4 % — LOW (ref 67–88)
SAO2 % BLDV: 62 % — LOW (ref 67–88)
SARS-COV-2 RNA SPEC QL NAA+PROBE: SIGNIFICANT CHANGE UP
SODIUM SERPL-SCNC: 128 MMOL/L — LOW (ref 135–145)
SODIUM UR-SCNC: 70 MMOL/L — SIGNIFICANT CHANGE UP
SP GR SPEC: >1.03 — HIGH (ref 1–1.03)
SPECIMEN SOURCE: SIGNIFICANT CHANGE UP
SPECIMEN SOURCE: SIGNIFICANT CHANGE UP
SQUAMOUS # UR AUTO: 1 /HPF — SIGNIFICANT CHANGE UP (ref 0–5)
UROBILINOGEN FLD QL: 0.2 MG/DL — SIGNIFICANT CHANGE UP (ref 0.2–1)
WBC # BLD: 19.8 K/UL — HIGH (ref 3.8–10.5)
WBC # FLD AUTO: 19.8 K/UL — HIGH (ref 3.8–10.5)
WBC UR QL: 2 /HPF — SIGNIFICANT CHANGE UP (ref 0–5)

## 2024-08-07 PROCEDURE — 99285 EMERGENCY DEPT VISIT HI MDM: CPT | Mod: FS

## 2024-08-07 PROCEDURE — 99223 1ST HOSP IP/OBS HIGH 75: CPT | Mod: GC

## 2024-08-07 PROCEDURE — 71045 X-RAY EXAM CHEST 1 VIEW: CPT | Mod: 26

## 2024-08-07 PROCEDURE — 74177 CT ABD & PELVIS W/CONTRAST: CPT | Mod: 26,MC

## 2024-08-07 RX ORDER — ACETAMINOPHEN 500 MG
650 TABLET ORAL EVERY 6 HOURS
Refills: 0 | Status: DISCONTINUED | OUTPATIENT
Start: 2024-08-07 | End: 2024-08-09

## 2024-08-07 RX ORDER — PIPERACILLIN SODIUM, TAZOBACTAM SODIUM 3; .375 G/15ML; G/15ML
3.38 INJECTION, POWDER, LYOPHILIZED, FOR SOLUTION INTRAVENOUS ONCE
Refills: 0 | Status: COMPLETED | OUTPATIENT
Start: 2024-08-07 | End: 2024-08-07

## 2024-08-07 RX ORDER — PANTOPRAZOLE SODIUM 20 MG/1
40 TABLET, DELAYED RELEASE ORAL
Refills: 0 | Status: DISCONTINUED | OUTPATIENT
Start: 2024-08-07 | End: 2024-08-09

## 2024-08-07 RX ORDER — GLUCAGON INJECTION, SOLUTION 0.5 MG/.1ML
1 INJECTION, SOLUTION SUBCUTANEOUS ONCE
Refills: 0 | Status: DISCONTINUED | OUTPATIENT
Start: 2024-08-07 | End: 2024-08-09

## 2024-08-07 RX ORDER — DEXTROSE 4 G
25 TABLET,CHEWABLE ORAL ONCE
Refills: 0 | Status: DISCONTINUED | OUTPATIENT
Start: 2024-08-07 | End: 2024-08-09

## 2024-08-07 RX ORDER — DULAGLUTIDE 1.5 MG/.5ML
1.5 INJECTION, SOLUTION SUBCUTANEOUS
Refills: 0 | DISCHARGE

## 2024-08-07 RX ORDER — DEXTROSE 4 G
12.5 TABLET,CHEWABLE ORAL ONCE
Refills: 0 | Status: DISCONTINUED | OUTPATIENT
Start: 2024-08-07 | End: 2024-08-09

## 2024-08-07 RX ORDER — DEXTROSE MONOHYDRATE, SODIUM CHLORIDE, SODIUM LACTATE, CALCIUM CHLORIDE, MAGNESIUM CHLORIDE 1.5; 538; 448; 18.4; 5.08 G/100ML; MG/100ML; MG/100ML; MG/100ML; MG/100ML
1000 SOLUTION INTRAPERITONEAL
Refills: 0 | Status: DISCONTINUED | OUTPATIENT
Start: 2024-08-07 | End: 2024-08-09

## 2024-08-07 RX ORDER — DEXTROSE 4 G
15 TABLET,CHEWABLE ORAL ONCE
Refills: 0 | Status: DISCONTINUED | OUTPATIENT
Start: 2024-08-07 | End: 2024-08-09

## 2024-08-07 RX ORDER — MELATONIN 3 MG
3 TABLET ORAL AT BEDTIME
Refills: 0 | Status: DISCONTINUED | OUTPATIENT
Start: 2024-08-07 | End: 2024-08-09

## 2024-08-07 RX ORDER — MAGNESIUM, ALUMINUM HYDROXIDE 200-225/5
30 SUSPENSION, ORAL (FINAL DOSE FORM) ORAL EVERY 4 HOURS
Refills: 0 | Status: DISCONTINUED | OUTPATIENT
Start: 2024-08-07 | End: 2024-08-09

## 2024-08-07 RX ORDER — INSULIN LISPRO 100/ML
VIAL (ML) SUBCUTANEOUS
Refills: 0 | Status: DISCONTINUED | OUTPATIENT
Start: 2024-08-07 | End: 2024-08-09

## 2024-08-07 RX ORDER — MAGNESIUM OXIDE 253 MG/1
400 TABLET ORAL DAILY
Refills: 0 | Status: DISCONTINUED | OUTPATIENT
Start: 2024-08-07 | End: 2024-08-09

## 2024-08-07 RX ORDER — ATORVASTATIN CALCIUM 40 MG/1
20 TABLET, FILM COATED ORAL DAILY
Refills: 0 | Status: DISCONTINUED | OUTPATIENT
Start: 2024-08-07 | End: 2024-08-09

## 2024-08-07 RX ORDER — MAGNESIUM OXIDE 253 MG/1
1 TABLET ORAL
Refills: 0 | DISCHARGE

## 2024-08-07 RX ORDER — ONDANSETRON HCL/PF 4 MG/2 ML
4 VIAL (ML) INJECTION EVERY 8 HOURS
Refills: 0 | Status: DISCONTINUED | OUTPATIENT
Start: 2024-08-07 | End: 2024-08-09

## 2024-08-07 RX ORDER — BACTERIOSTATIC SODIUM CHLORIDE 0.9 %
1000 VIAL (ML) INJECTION ONCE
Refills: 0 | Status: COMPLETED | OUTPATIENT
Start: 2024-08-07 | End: 2024-08-07

## 2024-08-07 RX ORDER — DEXTROSE MONOHYDRATE, SODIUM CHLORIDE, SODIUM LACTATE, CALCIUM CHLORIDE, MAGNESIUM CHLORIDE 1.5; 538; 448; 18.4; 5.08 G/100ML; MG/100ML; MG/100ML; MG/100ML; MG/100ML
1000 SOLUTION INTRAPERITONEAL ONCE
Refills: 0 | Status: COMPLETED | OUTPATIENT
Start: 2024-08-07 | End: 2024-08-07

## 2024-08-07 RX ORDER — OMEPRAZOLE 100 %
1 POWDER (GRAM) MISCELLANEOUS
Refills: 0 | DISCHARGE

## 2024-08-07 RX ADMIN — PIPERACILLIN SODIUM, TAZOBACTAM SODIUM 200 GRAM(S): 3; .375 INJECTION, POWDER, LYOPHILIZED, FOR SOLUTION INTRAVENOUS at 10:35

## 2024-08-07 RX ADMIN — Medication 1000 MILLILITER(S): at 14:16

## 2024-08-07 RX ADMIN — MAGNESIUM OXIDE 400 MILLIGRAM(S): 253 TABLET ORAL at 18:22

## 2024-08-07 RX ADMIN — ATORVASTATIN CALCIUM 20 MILLIGRAM(S): 40 TABLET, FILM COATED ORAL at 18:23

## 2024-08-07 RX ADMIN — DEXTROSE MONOHYDRATE, SODIUM CHLORIDE, SODIUM LACTATE, CALCIUM CHLORIDE, MAGNESIUM CHLORIDE 1000 MILLILITER(S): 1.5; 538; 448; 18.4; 5.08 SOLUTION INTRAPERITONEAL at 09:47

## 2024-08-07 NOTE — ED PROVIDER NOTE - CARE PLAN
1 Principal Discharge DX:	Leukocytosis  Secondary Diagnosis:	High serum lactate  Secondary Diagnosis:	Hyponatremia

## 2024-08-07 NOTE — ED PROVIDER NOTE - PROGRESS NOTE DETAILS
Lubna Carlin DO (Attending):  patient with elevated lactate and uptrending white count compared to labs a few days ago when patient was discharged.  Will empirically cover with antibiotics. Case discussed with nephrology for plan for admission, agrees with plan giving elevated wbc/lactate. stable for admit to [facility]  - Luis Duncan PA-C

## 2024-08-07 NOTE — H&P ADULT - HISTORY OF PRESENT ILLNESS
Patient is a 69 year old male with PMHx of T2DM, HTN, HPL, Nephrolithiasis and Left Kidney/Urothelial Cancer c/b left sided mild-moderate hydronephrosis. Presents to [facility] sent by outpt nephrologist Dr. Sen for CT of Kidneys with IV contrast and IV hydration before and after. Patient states he was feeling in his normal state of health and was due for his first recurrent chemo treatment this AM at [facility] however it was canceled due to the fact that [facility] thought patient was still admitted. Patient then spoke to his Nephrologist who was unable to get patient to an ambulatory setting for CT with IV hydrations and recommended he come to the ED.

## 2024-08-07 NOTE — CONSULT NOTE ADULT - SUBJECTIVE AND OBJECTIVE BOX
[facility] DIVISION OF INFECTIOUS DISEASES  BRADFORD Nolasco S. Shah, Y. Patel, G. Richard  [phone]  ([phone] - weekdays after 5pm and weekends)    JESSIE GREER  69y, Male  [id number]    HPI:  Patient is a 69 year old male with PMH of T2DM, HTN, HPL, Nephrolithiasis and Left Kidney/Urothelial Cancer c/b left sided mild-moderate hydronephrosis who was sent to [facility] by outpt nephrologist Dr. Sen for CT of Kidneys with IV contrast and IV hydration before and after. Patient states he was feeling in his normal state of health and was due for his first recurrent chemo treatment this AM at [facility] however it was canceled due to the fact that [facility] thought patient was still admitted. Patient then spoke to his Nephrologist who was unable to get patient to an ambulatory setting for CT with IV hydrations and recommended he come to the ED. Resting comfortably in ER, wife at bedside, states he feels fine with no complaints. Denies fever, chills, chest pain, abdominal pain, nausea, vomiting, diarrhea, dysuria or any other pain at this time. Per wife he has fevers in the evening/night with no change from before; states WBC has always been elevated per [facility] labs.   ROS: 14 point review of systems completed, pertinent positives and negatives as per HPI.    Allergies: No Known Allergies  PMH -- Coronary artery disease  Hypertension  Hyperlipidemia  Asthma  Diabetes mellitus  Anxiety  Glaucoma  Herniated disc  Bilateral carpal tunnel syndrome  Neuropathy  Kidney stone on right side  Type 2 diabetes mellitus  Stented coronary artery    PSH -- S/P shoulder surgery  S/P hernia surgery  S/P angioplasty with stent  Tendon laceration  H/O colonoscopy    FH -- Family history of Alzheimer's disease (Mother)  Family history of type 2 diabetes mellitus in father (Father)  FH: COPD (chronic obstructive pulmonary disease) (Father)    Social History -- denies tobacco, alcohol or illicit drug use    Physical Exam--  Vital Signs Last 24 Hrs  T(F): 99.5 (07 Aug 2024 08:21), Max: 99.5 (07 Aug 2024 08:21)  HR: 91 (07 Aug 2024 10:04) (91 - 117)  BP: 124/73 (07 Aug 2024 10:04) (110/68 - 124/73)  RR: 22 (07 Aug 2024 10:04) (22 - 24)  SpO2: 98% (07 Aug 2024 10:04) (98% - 98%)  General: no acute distress  HEENT: NC/AT, EOMI, anicteric  Lungs: clear to auscultation bilaterally  Heart: S1, S2 present, normal rate/rhythm  Abdomen: Soft. ND. NT. BS present.   Neuro: AAOx3, no obvious focal deficits   Extremities: No cyanosis. No edema.   Skin: Warm. Dry. No visible rash.   Lines: PIV     Laboratory & Imaging Data--  CBC:                       8.1    19.80 )-----------( 466      ( 07 Aug 2024 09:54 )             27.0     WBC Count: 19.80 K/uL (08-07-24 @ 09:54)  WBC Count: 16.62 K/uL (08-05-24 @ 06:21)  WBC Count: 16.79 K/uL (08-04-24 @ 06:50)  WBC Count: 15.12 K/uL (08-03-24 @ 07:35)  WBC Count: 20.77 K/uL (08-01-24 @ 20:14)    CMP: 08-07    128<L>  |  94<L>  |  36<H>  ----------------------------<  206<H>  4.1   |  16<L>  |  2.13<H>    Ca    10.0      07 Aug 2024 09:54    TPro  7.9  /  Alb  2.8<L>  /  TBili  0.4  /  DBili  x   /  AST  24  /  ALT  35  /  AlkPhos  174<H>  08-07    LIVER FUNCTIONS - ( 07 Aug 2024 09:54 )  Alb: 2.8 g/dL / Pro: 7.9 g/dL / ALK PHOS: 174 U/L / ALT: 35 U/L / AST: 24 U/L / GGT: x           Microbiology: reviewed  Culture - Blood (collected 08-02-24 @ 00:20)  Source: .Blood Blood-Venous  Final Report (08-07-24 @ 06:00):    No growth at 5 days    Culture - Blood (collected 08-02-24 @ 00:05)  Source: .Blood Blood-Venous  Final Report (08-07-24 @ 06:00):    No growth at 5 days    Culture - Urine (collected 08-01-24 @ 21:15)  Source: Clean Catch Clean Catch (Midstream)  Final Report (08-02-24 @ 23:37):    No growth    SARS-CoV-2 Result: NotDete (07 Aug 2024 10:47)    Radiology--reviewed  < from: Xray Chest 1 View- PORTABLE-Urgent (08.07.24 @ 10:45) >  IMPRESSION:    No focal consolidation.    < end of copied text >      Active Medications--  sodium chloride 0.9% Bolus 1000 milliLiter(s) IV Bolus once    Current Antimicrobials:     Prior/Completed Antimicrobials:  piperacillin/tazobactam IVPB...    Immunologic:

## 2024-08-07 NOTE — CONSULT NOTE ADULT - SUBJECTIVE AND OBJECTIVE BOX
[facility] DIVISION OF KIDNEY DISEASES AND HYPERTENSION -- [phone]  -- INITIAL CONSULT NOTE  --------------------------------------------------------------------------------  HPI: 68yo M w/ PMH of T2DM, HTN, HPL, nephrolithiasis, and left kidney/urothelial cancer (s/p carboplatin/gemcitabine, stopped avelumab 1mth ago) complicated by Left sided mild-moderate hydronephrosis for which he had a stent placed (removed and exchange unsuccessful last week) sent to ED on 8/1 by outpt nephrologist Dr. Sen for hyperkalemia (K 6.0), YANIRA (Scr 2.15) and PCN evaluation. Per IR and urology during that hospitalization, would only consider PCN if pt had pyelo or worsening YANIRA, but Cr remained stable at ~2. Pt had persistent fevers but cultures remained negative. Patient was discharged prior to CT A/P with IV contrast, which was recommended by IR. Pt did not want to miss his [facility] [place] [place] appt, but when he went the following day they told him they would not start tx at this time. Therefore, he returned to the ED today to get CT A/P with IV contrast with IVF pre- and post. Labs notable for chronic leukocytosis, stable Cr, mild hyponatremia with Na 128, and lact 3.6 that improved with IVF. Nephrology consulted for CKD management.    Pt was seen and examined at bedside. He denies any new complaints. Still has persistent low grade fevers at night. Denies SOB, chest pain, abd pain, n/v/d, reports some constipation.    Kidney hx: Hx of nephrolithiasis, left carotid artery stenosis and left kidney/urothelial cancer. He initially presented in 6/2023 with hematuria and kidney stones. Imaging showed a mass in the left kidney lower pole/collecting system region, complicated by mild-moderate hydronephrosis for which he had a stent placed. 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 Received 6 cycles of chemo with carboplatin/gemcitabine 8/2023-12/2023 with good response and has been maintained on avelumab. Ureteral stent was exchanged successfully in 2/2024 but exchange last week at [facility] [place] was unsuccessful. Per patient's wife, the stent was removed but unable to be replaced due to obstruction of the renal pelvis due to "tumor". He was given cephalexin x 7 days, and nephrostomy tube placement was discussed. Lab results scanned into the patient's chart from [facility] [place] from 7/2/2024 show Cr 2.4 (reportedly increased from 2.1 in recent past - prompting the referral) and K 4.9. A quick review of historical labs on the patient's phone shows near normal Cr in 2023 before cancer diagnosis and starting chemotherapy.       PAST HISTORY  --------------------------------------------------------------------------------  PAST MEDICAL & SURGICAL HISTORY:  Coronary artery disease  Hypertension  Hyperlipidemia  Asthma  Denies use of Inhaler at this time  Anxiety  Glaucoma  bilat. " elevated pressure"- notes eye drops have been stopped for a year now  Herniated disc  Bilateral carpal tunnel syndrome  Neuropathy  fingers ,hands ,feet-bilateral  Kidney stone on right side  Type 2 diabetes mellitus  Stented coronary artery  2010 RIGHT Coronary Artery Stent  S/P shoulder surgery  1971, 1975- LEFT Shoulder  S/P hernia surgery  2010  S/P angioplasty with stent  x 1 2011  Tendon laceration  left hand 1988      PAST SOCIAL HISTORY:    ALLERGIES & MEDICATIONS  --------------------------------------------------------------------------------  Allergies    No Known Allergies      Standing Inpatient Medications  atorvastatin 20 milliGRAM(s) Oral daily  dextrose 5%. 1000 milliLiter(s) IV Continuous <Continuous>  dextrose 5%. 1000 milliLiter(s) IV Continuous <Continuous>  dextrose 50% Injectable 25 Gram(s) IV Push once  dextrose 50% Injectable 12.5 Gram(s) IV Push once  dextrose 50% Injectable 25 Gram(s) IV Push once  glucagon  Injectable 1 milliGRAM(s) IntraMuscular once  insulin lispro (ADMELOG) corrective regimen sliding scale   SubCutaneous three times a day before meals  magnesium oxide 400 milliGRAM(s) Oral daily  pantoprazole    Tablet 40 milliGRAM(s) Oral before breakfast    PRN Inpatient Medications  acetaminophen     Tablet .. 650 milliGRAM(s) Oral every 6 hours PRN  aluminum hydroxide/magnesium hydroxide/simethicone Suspension 30 milliLiter(s) Oral every 4 hours PRN  dextrose Oral Gel 15 Gram(s) Oral once PRN  melatonin 3 milliGRAM(s) Oral at bedtime PRN  ondansetron Injectable 4 milliGRAM(s) IV Push every 8 hours PRN      REVIEW OF SYSTEMS  --------------------------------------------------------------------------------  Constitutional: No fevers   Respiratory: No SOB  Cardiovascular: No chest pain  Gastrointestinal: No abdominal pain, diarrhea, nausea, vomiting  Genitourinary: No dysuria, hematuria, urgency    All other review of systems noted above.    VITALS/PHYSICAL EXAM  --------------------------------------------------------------------------------  T(C): 37.5 (08-07-24 @ 08:21), Max: 37.5 (08-07-24 @ 08:21)  HR: 88 (08-07-24 @ 16:24) (88 - 117)  BP: 107/66 (08-07-24 @ 16:24) (107/66 - 124/73)  RR: 20 (08-07-24 @ 16:24) (20 - 24)  SpO2: 98% (08-07-24 @ 16:24) (98% - 98%)  Wt(kg): --  Height (cm): 177.8 (08-07-24 @ 08:21)  Weight (kg): 95.3 (08-07-24 @ 08:21)  BMI (kg/m2): 30.1 (08-07-24 @ 08:21)  BSA (m2): 2.13 (08-07-24 @ 08:21)      Physical Exam:                       Gen: NAD                       Pulm: CTA B/L                       CV: +S1S2                       Abd: +BS, soft, nondistended/nontender                       Extremities: no bilateral LE edema                       Neuro: non-focal    LABS/STUDIES  --------------------------------------------------------------------------------              8.1    19.80 >-----------<  466      [08-07-24 @ 09:54]              27.0     128  |  94  |  36  ----------------------------<  206      [08-07-24 @ 09:54]  4.1   |  16  |  2.13        Ca     10.0     [08-07-24 @ 09:54]    TPro  7.9  /  Alb  2.8  /  TBili  0.4  /  DBili  x   /  AST  24  /  ALT  35  /  AlkPhos  174  [08-07-24 @ 09:54]    PT/INR: PT 15.4 , INR 1.41       [08-07-24 @ 09:54]  PTT: 27.9       [08-07-24 @ 09:54]    Creatinine Trend:  SCr 2.13 [08-07 @ 09:54]  SCr 2.15 [08-05 @ 06:21]  SCr 2.06 [08-04 @ 06:50]  SCr 2.09 [08-03 @ 06:57]  SCr 1.96 [08-02 @ 03:40]    Iron 13, TIBC 141, %sat 9      [08-03-24 @ 06:57]  Ferritin 1457      [08-03-24 @ 07:05]

## 2024-08-07 NOTE — CONSULT NOTE ADULT - ATTENDING COMMENTS
70yo M w/ PMH of T2DM, HTN, HPL, nephrolithiasis, and left kidney/urothelial cancer (s/p carboplatin/gemcitabine, stopped avelumab 1mth ago) complicated by Left sided mild-moderate hydronephrosis for which he had a stent placed (removed and exchange unsuccessful)     this time coming in to get a CT abdomen and pelvis with iv contrast to better assess his kidneys     - Creatinine is at his baseline   - agree with iv fluids pre and post contrast   - Hyponatremia likely increased ADH due to cancer+ impaired free water clearance due to renal failure     shraddha goldsmith  nephrology attending   please contact me on TEAMS   Office- [phone]

## 2024-08-07 NOTE — H&P ADULT - NSHPLABSRESULTS_GEN_ALL_CORE
LABS:                        8.1    19.80 )-----------( 466      ( 07 Aug 2024 09:54 )             27.0     08-07    128<L>  |  94<L>  |  36<H>  ----------------------------<  206<H>  4.1   |  16<L>  |  2.13<H>    Ca    10.0      07 Aug 2024 09:54    TPro  7.9  /  Alb  2.8<L>  /  TBili  0.4  /  DBili  x   /  AST  24  /  ALT  35  /  AlkPhos  174<H>  08-07    PT/INR - ( 07 Aug 2024 09:54 )   PT: 15.4 sec;   INR: 1.41 ratio         PTT - ( 07 Aug 2024 09:54 )  PTT:27.9 sec  CAPILLARY BLOOD GLUCOSE            Urinalysis Basic - ( 07 Aug 2024 09:54 )    Color: x / Appearance: x / SG: x / pH: x  Gluc: 206 mg/dL / Ketone: x  / Bili: x / Urobili: x   Blood: x / Protein: x / Nitrite: x   Leuk Esterase: x / RBC: x / WBC x   Sq Epi: x / Non Sq Epi: x / Bacteria: x        RADIOLOGY & ADDITIONAL TESTS:    < from: Xray Chest 1 View- PORTABLE-Urgent (08.07.24 @ 10:45) >    No focal consolidation.    < end of copied text >        Imaging Personally Reviewed:  [x] YES  [ ] NO    Consultant(s) Notes Reviewed:  [x] YES  [ ] NO    Care Discussed with Consultants/Other Providers [x] YES  [ ] NO

## 2024-08-07 NOTE — CONSULT NOTE ADULT - ASSESSMENT
Patient is a 69 year old male with PMH of T2DM, HTN, HPL, Nephrolithiasis and Left Kidney/Urothelial metastatic cancer with heavy tumor burden in the L kidney, c/b left sided mild-moderate hydronephrosis with inability to place retrograde ureteral stent who was sent to [facility] by outpt nephrologist Dr. Sen for CT of Kidneys with IV contrast and IV hydration before and after. Patient states he was feeling in his normal state of health and was due for his first recurrent chemo treatment this AM at [facility] however it was canceled due to the fact that [facility] thought patient was still admitted. Patient then spoke to his Nephrologist who was unable to get patient to an ambulatory setting for CT with IV hydrations and recommended he come to the ED.     Leukocytosis - chronic per review  suspect likely malignancy related  Rule out infectious source   WBC noted, nontoxic, afebrile  CXR with no pneumonia   exam with no new focal findings  abd soft, nontender   s/p zosyn x1 in the ER    Recommendations:   Urology and Nephrology following  Follow up CTAP with contrast-pending   Continue to monitor off antibiotics for now  Monitor temps/WBC - if any fevers or worsening - start zosyn  Continue rest of care per primary team       Talib Brooke M.D.  [facility], Division of Infectious Diseases  [phone]  After 5pm on weekdays and all day on weekends - please call [phone]  Available on [employer] TEAMS

## 2024-08-07 NOTE — ED PROVIDER NOTE - NSFOLLOWUPINSTRUCTIONS_ED_ALL_ED_FT
Please follow up with your primary care doctor in 1-3 days.  Follow up at [facility] [place] to begin treatment sessions    *Bring all printed lab/test results to your appointment(s).*    Continue all home medications    Return for fevers, chills, chest pain, shortness of breath, dizziness, loss of consciousness, or any other concerns.

## 2024-08-07 NOTE — CONSULT NOTE ADULT - SUBJECTIVE AND OBJECTIVE BOX
69 year old male with hx of T2DM, HTN, HPL, nephrolithiasis, and left kidney/urothelial cancer (s/p carboplatin/gemcitabine, stopped avelumab 1mth ago) complicated by Left sided mild-moderate hydronephrosis for which he had had a stent placed but was unable to be exchanged.  Per Notes by Urologist at [facility] [place], the renal pelvis was full of tumor.  No other diversion procedure was done at that time.        Today he was sent by outpt nephrologist Dr. Sen for CT A/P with contrast with IV Hydration (unable to do IV Hydration as outpt). Pt currently without any complaints. He feels some discomfort  in his L flank region occasionally but no pain. Denies any dysuria or hematuria. No issues voiding. No chest pains, palpitations, SOB, abdominal pain, constipation ,diarrhea, nausea or vomiting.  Hyperkalemia improved after last admission. Also noted to have YANIRA with slightly worsening creating from 24 (1.96 -> 2.09). At the last admission,  IR did not want to proceed with nephrostomy tube due to high risk of bleeding and seeding of tumor.      He was diagnosed about one year ago and was previously on gem carbo x 6 through 2023 -> avelumab since 2024 then held since 24 due to CRI anemia, dizziness. PET 2024 shows progression of disease at multiple sites. Currently will be starting treatment next week on  enfortumab vedotin. 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 Follows with Dr. Herring at [facility] [place] and Dr. Gómez.        PAST MEDICAL & SURGICAL HISTORY:  Coronary artery disease      Hypertension      Hyperlipidemia      Asthma  Denies use of Inhaler at this time      Anxiety      Glaucoma  bilat. " elevated pressure"- notes eye drops have been stopped for a year now      Herniated disc      Bilateral carpal tunnel syndrome      Neuropathy  fingers ,hands ,feet-bilateral      Kidney stone on right side      Type 2 diabetes mellitus      Stented coronary artery   RIGHT Coronary Artery Stent      S/P shoulder surgery  , - LEFT Shoulder      S/P hernia surgery        S/P angioplasty with stent  x 1       Tendon laceration  left hand       H/O colonoscopy    HPI:  69y Male with PMHx    PAST MEDICAL & SURGICAL HISTORY:  Coronary artery disease      Hypertension      Hyperlipidemia      Asthma  Denies use of Inhaler at this time      Anxiety      Glaucoma  bilat. " elevated pressure"- notes eye drops have been stopped for a year now      Herniated disc      Bilateral carpal tunnel syndrome      Neuropathy  fingers ,hands ,feet-bilateral      Kidney stone on right side      Type 2 diabetes mellitus      Stented coronary artery  2010 RIGHT Coronary Artery Stent      S/P shoulder surgery  , - LEFT Shoulder      S/P hernia surgery        S/P angioplasty with stent  x 1       Tendon laceration  left hand       H/O colonoscopy        FAMILY HISTORY:  Family history of Alzheimer's disease (Mother)  Mother -  Age 82    Family history of type 2 diabetes mellitus in father (Father)  Father - Age 80    FH: COPD (chronic obstructive pulmonary disease) (Father)  Father -  Age 80    No known  malignancy     SOCIAL HISTORY  Denies alcohol and drug abuse, nonsmoker     MEDICATIONS  (STANDING):    MEDICATIONS  (PRN):    Allergies    No Known Allergies    Intolerances      REVIEW OF SYSTEMS: Pertinent positives and negatives as stated in HPI, otherwise negative    Vital signs  T(C): 37.5 (24 @ 08:21), Max: 37.5 (24 @ 08:21)  HR: 91 (24 @ 10:04)  BP: 124/73 (24 @ 10:04)  SpO2: 98% (24 @ 10:04)  Wt(kg): --    Physical Exam  Gen: NAD  HEENT: normocephalic, atraumatic, no scleral icterus  Pulm: No respiratory distress, no subcostal retractions, no accessory muscle use   CV: RRR,  no JVD  Abd: Soft, NT, ND, no rebound tenderness or guarding  : Uncircumcised/Circumcised, no lesions.  No discharge or blood at urethral meatus.  Testes descended bilaterally.  Testes and epididymis nontender bilaterally.  Cremasteric reflex present bilaterally.  MSK:  No CVAT, Moving all extremities, full ROM in all extremities, No edema present  NEURO: A&Ox3, no focal neurological deficits, CN 2-12 grossly intact  SKIN: warm, dry, no rash.    LABS:  CBC                       8.1    19.80 )-----------( 466      ( 07 Aug 2024 09:54 )             27.0     BMP   08-07    128<L>  |  94<L>  |  36<H>  ----------------------------<  206<H>  4.1   |  16<L>  |  2.13<H>    Ca    10.0      07 Aug 2024 09:54    TPro  7.9  /  Alb  2.8<L>  /  TBili  0.4  /  DBili  x   /  AST  24  /  ALT  35  /  AlkPhos  174<H>  08-    PT/INR - ( 07 Aug 2024 09:54 )   PT: 15.4 sec;   INR: 1.41 ratio         PTT - ( 07 Aug 2024 09:54 )  PTT:27.9 sec    Urinalysis Basic - ( 07 Aug 2024 09:54 )    Color: x / Appearance: x / SG: x / pH: x  Gluc: 206 mg/dL / Ketone: x  / Bili: x / Urobili: x   Blood: x / Protein: x / Nitrite: x   Leuk Esterase: x / RBC: x / WBC x   Sq Epi: x / Non Sq Epi: x / Bacteria: x    Radiology:    24 CT A/P with contrast     FINDINGS:  LOWER CHEST: Within normal limits.    LIVER: Ill-defined hypoattenuating liver lesions measuring up to 5.6 x   5.3 cm in the inferior right hepatic lobe.  BILE DUCTS: Normal caliber.  GALLBLADDER: Within normal limits.  SPLEEN: Within normal limits.  PANCREAS: Within normal limits.  ADRENALS: Within normal limits.  KIDNEYS/URETERS: Ill-defined left interpolar/lower pole renal mass,   suboptimally assessed without intravenous contrast, measuring about 9.0 x   7.6 cm in the axial plane (301-52). Multiple tumor deposits in the left   perinephric space, for example measuring 4.1 x 3.6 cm inferiorly   (301-69). There is another 2.9 x 2.4 cm tumoral implant in the left   para-aortic space at its indistinguishable from the adjacent left renal   vein. While evaluation is suboptimal without intravenous contrast,   heterogeneous attenuation of the proximal left renal vein is suspicious   for tumor thrombus. Dilated left upper pole renal calyces to the level of   the renal pelvis, which is likely filled with tumor. The ureters are   grossly normal in caliber. Several hyperdense and hypodense right renal   lesions are suboptimally assessed. Nonobstructing right renal stones   without right hydronephrosis.    BLADDER: Within normal limits.  REPRODUCTIVE ORGANS: Prostate is enlarged.    BOWEL: No bowel obstruction. Appendix is normal.  PERITONEUM/RETROPERITONEUM: Multiple retroperitoneal perinephric tumor   deposits as described above. There is also a tumoral implant in the left   upper quadrant adjacent to the spleen measuring 3.3 x 2.8 cm (301-29).  VESSELS: Atherosclerotic changes.  LYMPH NODES: Retroperitoneal lymphadenopathy, for example an aortocaval   node measuring 1 cm in short axis (301-36) and left para-aortic lymph   node versus tumor deposit measuring 1.3 cm in short axis (301-49)  ABDOMINAL WALL: Within normal limits.  BONES: Degenerative changes. Postsurgical changes in the L3 and L4   vertebral bodies.    IMPRESSION:  Ill-defined left renal mass, compatible with given history of known   urothelial malignancy. Multiple left perinephric tumor deposits with   suspected tumor thrombus in the left renal vein, suboptimally assessed   without intravenous contrast. Dilated left upper pole renal calyces to   the level of the renal pelvis which is likely filled with tumor. Further   evaluation with CT urogram can be performed.    Several ill-defined hypoattenuating liver lesions suspicious for   metastases. Left upper quadrant peritoneal implant.    Please note a preliminary report was provided by [email or web address].        --- End of Report ---       69 year old male with hx of T2DM, HTN, HPL, nephrolithiasis, and left kidney/urothelial cancer (s/p carboplatin/gemcitabine, stopped avelumab 1mth ago) complicated by Left sided mild-moderate hydronephrosis for which he had had a stent placed but was unable to be exchanged.  [place] [facility] [place] full of tumor.  No other diversion procedure was done at that time.        Today he was sent by outpt nephrologist Dr. Sen for CT A/P with contrast with IV Hydration (unable to do IV Hydration as outpt). Pt currently without any complaints. He feels some discomfort  in his L flank region occasionally but no pain. Denies any dysuria or hematuria. No issues voiding. No chest pains, palpitations, SOB, abdominal pain, constipation ,diarrhea, nausea or vomiting.  Hyperkalemia improved after last admission. Also noted to have YANIRA with slightly worsening creating from 24 (1.96 -> 2.09). At the last admission,  IR did not want to proceed with nephrostomy tube due to high risk of bleeding and seeding of tumor.      He was diagnosed about one year ago and was previously on gem carbo x 6 through 2023 -> avelumab since 2024 then held since 24 due to CRI anemia, dizziness. PET 2024 shows progression of disease at multiple sites. Currently will be starting treatment next week on  enfortumab vedotin. 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 [place] Herring [place] [facility] [place]. Gómez.        PAST MEDICAL & SURGICAL HISTORY:  Coronary artery disease      Hypertension      Hyperlipidemia      Asthma  Denies use of Inhaler at this time      Anxiety      Glaucoma  bilat. " elevated pressure"- notes eye drops have been stopped for a year now      Herniated disc      Bilateral carpal tunnel syndrome      Neuropathy  fingers ,hands ,feet-bilateral      Kidney stone on right side      Type 2 diabetes mellitus      Stented coronary artery   RIGHT Coronary Artery Stent      S/P shoulder surgery  , - LEFT Shoulder      S/P hernia surgery        S/P angioplasty with stent  x 1       Tendon laceration  left hand       H/O colonoscopy    HPI:  69y Male with PMHx    PAST MEDICAL & SURGICAL HISTORY:  Coronary artery disease      Hypertension      Hyperlipidemia      Asthma  Denies use of Inhaler at this time      Anxiety      Glaucoma  bilat. " elevated pressure"- notes eye drops have been stopped for a year now      Herniated disc      Bilateral carpal tunnel syndrome      Neuropathy  fingers ,hands ,feet-bilateral      Kidney stone on right side      Type 2 diabetes mellitus      Stented coronary artery  2010 RIGHT Coronary Artery Stent      S/P shoulder surgery  , - LEFT Shoulder      S/P hernia surgery        S/P angioplasty with stent  x 1       Tendon laceration  left hand       H/O colonoscopy        FAMILY HISTORY:  Family history of Alzheimer's disease (Mother)  Mother -  Age 82    Family history of type 2 diabetes mellitus in father (Father)  Father - Age 80    FH: COPD (chronic obstructive pulmonary disease) (Father)  Father -  Age 80    No known  malignancy     SOCIAL HISTORY  Denies alcohol and drug abuse, nonsmoker     MEDICATIONS  (STANDING):    MEDICATIONS  (PRN):    Allergies    No Known Allergies    Intolerances      REVIEW OF SYSTEMS: Pertinent positives and negatives as stated in HPI, otherwise negative    Vital signs  T(C): 37.5 (24 @ 08:21), Max: 37.5 (24 @ 08:21)  HR: 91 (24 @ 10:04)  BP: 124/73 (24 @ 10:04)  SpO2: 98% (24 @ 10:04)  Wt(kg): --    Physical Exam  Gen: NAD  HEENT: normocephalic, atraumatic, no scleral icterus  Pulm: No respiratory distress, no subcostal retractions, no accessory muscle use   CV: RRR,  no JVD  Abd: Soft, NT, ND, no rebound tenderness or guarding  : Uncircumcised/Circumcised, no lesions.  No discharge or blood at urethral meatus.  Testes descended bilaterally.  Testes and epididymis nontender bilaterally.  Cremasteric reflex present bilaterally.  MSK:  No CVAT, Moving all extremities, full ROM in all extremities, No edema present  NEURO: A&Ox3, no focal neurological deficits, CN 2-12 grossly intact  SKIN: warm, dry, no rash.    LABS:  CBC                       8.1    19.80 )-----------( 466      ( 07 Aug 2024 09:54 )             27.0     BMP   08-07    128<L>  |  94<L>  |  36<H>  ----------------------------<  206<H>  4.1   |  16<L>  |  2.13<H>    Ca    10.0      07 Aug 2024 09:54    TPro  7.9  /  Alb  2.8<L>  /  TBili  0.4  /  DBili  x   /  AST  24  /  ALT  35  /  AlkPhos  174<H>  08-    PT/INR - ( 07 Aug 2024 09:54 )   PT: 15.4 sec;   INR: 1.41 ratio         PTT - ( 07 Aug 2024 09:54 )  PTT:27.9 sec    Urinalysis Basic - ( 07 Aug 2024 09:54 )    Color: x / Appearance: x / SG: x / pH: x  Gluc: 206 mg/dL / Ketone: x  / Bili: x / Urobili: x   Blood: x / Protein: x / Nitrite: x   Leuk Esterase: x / RBC: x / WBC x   Sq Epi: x / Non Sq Epi: x / Bacteria: x    Radiology:    24 CT A/P with contrast     FINDINGS:  LOWER CHEST: Within normal limits.    LIVER: Ill-defined hypoattenuating liver lesions measuring up to 5.6 x   5.3 cm in the inferior right hepatic lobe.  BILE DUCTS: Normal caliber.  GALLBLADDER: Within normal limits.  SPLEEN: Within normal limits.  PANCREAS: Within normal limits.  ADRENALS: Within normal limits.  KIDNEYS/URETERS: Ill-defined left interpolar/lower pole renal mass,   suboptimally assessed without intravenous contrast, measuring about 9.0 x   7.6 cm in the axial plane (301-52). Multiple tumor deposits in the left   perinephric space, for example measuring 4.1 x 3.6 cm inferiorly   (301-69). There is another 2.9 x 2.4 cm tumoral implant in the left   para-aortic space at its indistinguishable from the adjacent left renal   vein. While evaluation is suboptimal without intravenous contrast,   heterogeneous attenuation of the proximal left renal vein is suspicious   for tumor thrombus. Dilated left upper pole renal calyces to the level of   the renal pelvis, which is likely filled with tumor. The ureters are   grossly normal in caliber. Several hyperdense and hypodense right renal   lesions are suboptimally assessed. Nonobstructing right renal stones   without right hydronephrosis.    BLADDER: Within normal limits.  REPRODUCTIVE ORGANS: Prostate is enlarged.    BOWEL: No bowel obstruction. Appendix is normal.  PERITONEUM/RETROPERITONEUM: Multiple retroperitoneal perinephric tumor   deposits as described above. There is also a tumoral implant in the left   upper quadrant adjacent to the spleen measuring 3.3 x 2.8 cm (301-29).  VESSELS: Atherosclerotic changes.  LYMPH NODES: Retroperitoneal lymphadenopathy, for example an aortocaval   node measuring 1 cm in short axis (301-36) and left para-aortic lymph   node versus tumor deposit measuring 1.3 cm in short axis (301-49)  ABDOMINAL WALL: Within normal limits.  BONES: Degenerative changes. Postsurgical changes in the L3 and L4   vertebral bodies.    IMPRESSION:  Ill-defined left renal mass, compatible with given history of known   urothelial malignancy. Multiple left perinephric tumor deposits with   suspected tumor thrombus in the left renal vein, suboptimally assessed   without intravenous contrast. Dilated left upper pole renal calyces to   the level of the renal pelvis which is likely filled with tumor. Further   evaluation with CT urogram can be performed.    Several ill-defined hypoattenuating liver lesions suspicious for   metastases. Left upper quadrant peritoneal implant.    Please note a preliminary report was provided by [employer].        --- End of Report ---      ACC: [id number] EXAM: CT ABDOMEN AND PELVIS IC ORDERED BY: JOCE WOLFE    PROCEDURE DATE: 2024        INTERPRETATION: CLINICAL INFORMATION: eval for hydro, abscess MCT    COMPARISON: 8.2.24.    CONTRAST/COMPLICATIONS:  IV Contrast: Omnipaque 350 90 cc administered 10 cc discarded  Oral Contrast: NONE  Complications: None reported at time of study completion    PROCEDURE:  CT of the Abdomen and Pelvis was performed.  Sagittal and coronal reformats were performed.    FINDINGS:    LOWER CHEST: Within normal limits.    LIVER: A 4 x 3 cm right lobe lesion. A 1.6 x 1.4 cm right lobe lesion.  BILE DUCTS: Normal caliber.  GALLBLADDER: Within normal limits.  SPLEEN: A 3.2 cm mass in the splenic hilum.  PANCREAS: Within normal limits.  ADRENALS: Within normal limits.  KIDNEYS/URETERS: Cysts and other lesions too small to characterize. Indeterminate 1.3 cm right upper pole renal lesion. Subcentimeter right renal calculi.    There is a large infiltrative mass involving the left mid and lower kidney with effacement of the collecting system measuring 7 cm. There is tumor thrombus in the involving the left renal vein best seen on coronal image 61. There is moderate hydronephrosis of the right upper pole collecting system. There are left-sided perinephric nodules including a 1.5 cm posterior nodule on image 49. There is a 2.9 x 2.1 cm left para-aortic node. Additional smaller nodes are noted.    BLADDER: Within normal limits.  REPRODUCTIVE ORGANS: The prostate is enlarged and heterogeneous.    BOWEL: No bowel obstruction.  PERITONEUM: No ascites.  VESSELS: Within normal limits.  RETROPERITONEUM/LYMPH NODES: See above.  ABDOMINAL WALL: Small right inguinal hernia.  BONES: Within normal limits.    IMPRESSION: Large infiltrative left renal mass consistent with history of urothelial cancer.    Tumoral invasion of the left renal vein.    Perinephric implants and retroperitoneal lymphadenopathy.    A 3.2 cm implant in the splenic hilum.    Right-sided liver lesions suspicious for metastatic disease.    Left upper pole hydronephrosis.    Enlarged heterogeneous prostate.    --- End of Report --

## 2024-08-07 NOTE — H&P ADULT - NSHPREVIEWOFSYSTEMS_GEN_ALL_CORE
GENERAL: no weakness, no fever/chills, no weight loss/gain  EYES/ENT: No visual changes, no vertigo or throat pain  NECK: No pain or stiffness   RESPIRATORY: no cough, no wheezing, no hemoptysis, no dyspnea, no shortness of breath  CARDIOVASCULAR: no chest pain or palpitations  GASTROINTESTINAL: no n/v/d, no abdominal or epigastric pain  GENITOURINARY: no dysuria, no frequency, no nocturia, no hematuria  MUSCULOSKELETAL: no trauma, no sprain/strain, no myalgias, no arthralgias, no fracture  NEUROLOGICAL: no HA, no dizziness, no weakness, no numbness  SKIN: No itching, rashes

## 2024-08-07 NOTE — ED PROVIDER NOTE - OBJECTIVE STATEMENT
69-year-old male history type 2 diabetes, HTN, HPL, nephrolithiasis, recurrent left kidney/urothelial cancer status posttreatment complicated by left sided mild to moderate hydronephrosis with stent placed and removed last week, sent by outpatient nephrology to obtain IV hydration prior to CT abdomen pelvis with IV contrast.  Patient reports otherwise asymptomatic feeling well since time of discharge, recent admit for hyperkalemia.  Tolerating p.o., making adequate amount of urine.  Denies abdominal pain, hematuria, nausea, vomiting, fever, chills, chest pain, shortness of breath, dizziness, palpitations, LOC. pt was supposed to have first recurrent chemo session this am at [facility] [place], was canceled as they thought pt was still admitted and has not had CT w/IV contrast

## 2024-08-07 NOTE — ED ADULT NURSE NOTE - OBJECTIVE STATEMENT
70 yo presents to the ED from home. A&Ox4, ambulatory with wife at bedside, sent by outpatient nephrology to obtain IV hydration prior to CT abdomen pelvis with IV contrast. Patient reports otherwise asymptomatic feeling well since time of discharge, recent admit for hyperkalemia. Tolerating p.o., making adequate amount of urine. Denies abdominal pain, hematuria, nausea, vomiting, fever, chills, chest pain, shortness of breath, dizziness, palpitations, LOC. pt has history of type 2 diabetes, HTN, HPL, nephrolithiasis, recurrent left kidney/urothelial cancer status posttreatment complicated by left sided mild to moderate hydronephrosis with stent placed and removed last week. pt was supposed to have first recurrent chemo session this am at [facility] [place], was canceled as they thought pt was still admitted and has not had CT w/IV contrast. 20G inserted L forearm. Patient undressed and placed into gown, call bell in hand and side rails up for safety. warm blanket provided, vital signs stable, pt in no acute distress.

## 2024-08-07 NOTE — H&P ADULT - NSHPPHYSICALEXAM_GEN_ALL_CORE
Vital Signs Last 24 Hrs  T(C): 37.5 (07 Aug 2024 08:21), Max: 37.5 (07 Aug 2024 08:21)  T(F): 99.5 (07 Aug 2024 08:21), Max: 99.5 (07 Aug 2024 08:21)  HR: 91 (07 Aug 2024 10:04) (91 - 117)  BP: 124/73 (07 Aug 2024 10:04) (110/68 - 124/73)  BP(mean): --  RR: 22 (07 Aug 2024 10:04) (22 - 24)  SpO2: 98% (07 Aug 2024 10:04) (98% - 98%)    Parameters below as of 07 Aug 2024 10:04  Patient On (Oxygen Delivery Method): room air    PHYSICAL EXAM:  GENERAL: NAD, well-developed, comfortable  HEAD:  Atraumatic, Normocephalic  EYES: EOMI, PERRLA, conjunctiva and sclera clear  NECK: Supple, No JVD  CHEST/LUNG: Clear to auscultation bilaterally; No wheeze  HEART: Regular rate and rhythm; No murmurs, rubs, or gallops  ABDOMEN: Soft, Nontender, Nondistended; Bowel sounds present  NEURO: AAOx3, no focal weakness, 5/5 b/l extremity strength, b/l knee no arthritis, no effusion   EXTREMITIES:  2+ Peripheral Pulses, No clubbing, cyanosis, or edema  SKIN: No rashes or lesions

## 2024-08-07 NOTE — H&P ADULT - ASSESSMENT
Patient is a 69 year old male with PMHx of T2DM, HTN, HPL, Nephrolithiasis and Left Kidney/Urothelial Cancer c/b left sided mild-moderate hydronephrosis. Presents to [facility] sent by outpt nephrologist Dr. Sen for CT of Kidneys with IV contrast and IV hydration before and after.    Plan:    # Leukocytosis:  - Afebrile  - Lactate 3.6  - Fu BCx/UCx (in progress)  - CXR with no focal consolidation  - CT A/P completed, fu results   - sp IV abx x1 in ED  - Abx mgmt per ID  - Monitor temps/WBC  - ID consult pending (called)    # YANIRA/Hyponatremia:  - Na 128 on admission  - Renal consult pending (follows with house)    # Nephrolithiasis and Left Kidney/Urothelial Cancer:  - Metastatic cancer with heavy tumor burden in the left kidney causing inability to place a retrograde ureteral stent  - Fu CT A/P-> nephrology concerned regarding possible fluid collection around the left kidney, r/o infectious collection   - Follows with [facility] [place]  - Fu Heme recs    # DM2:  - HgbA1c 6.6% ( from previous admission last week)  - Continue to monitor blood glucose levels  - Sliding Scale    # HTN:  - Trend BP, currently off BP meds    # DVT ppx:  - IPCs    [employer]  [phone]     Patient is a 69 year old male with PMHx of T2DM, HTN, HPL, Nephrolithiasis and Left Kidney/Urothelial Cancer c/b left sided mild-moderate hydronephrosis. Presents to [facility] sent by outpt nephrologist Dr. Sen for CT of Kidneys with IV contrast and IV hydration before and after.    Plan:    # Leukocytosis likely 2/2 malignancy:  - Afebrile  - Lactate 3.6  - Fu BCx/UCx (in progress)  - CXR with no focal consolidation  - CT A/P completed, fu results   - sp IV abx x1 in ED  - Monitor off abx per ID -> if any fevers or worsening start Zosyn   - Monitor temps/WBC  - ID following    # YANIRA/Hyponatremia:  - Na 128 on admission  - Renal consult pending (follows with house)    # Nephrolithiasis and Left Kidney/Urothelial Cancer:  - Metastatic cancer with heavy tumor burden in the left kidney causing inability to place a retrograde ureteral stent  - Fu CT A/P-> nephrology concerned regarding possible fluid collection around the left kidney, r/o infectious collection   - Follows with [facility] [place]  - Fu Heme recs    # DM2:  - HgbA1c 6.6% ( from previous admission last week)  - Continue to monitor blood glucose levels  - Sliding Scale    # HTN:  - Trend BP, currently off BP meds    # DVT ppx:  - IPCs    [employer]  [phone]

## 2024-08-07 NOTE — ED ADULT NURSE NOTE - NS PRO AD NO ADVANCE DIRECTIVE
PA approved  Wife notified   Refill sent  
Pt wife called requesting start PA for Omeprazole  Appt scheduled   States PA [facility] [phone]    
No

## 2024-08-07 NOTE — CONSULT NOTE ADULT - ASSESSMENT
69 year old male with hx of T2DM, HTN, HPL, nephrolithiasis, and left kidney/urothelial cancer who was sent in by his nephrologist to have a CT A/P with IV Hydration. There is currently no  complaints appreciable as per pt.     Assessment:  68 yo male with left kidney/urothelial metastatic cancer with heavy tumor burden in the left kidney causing inability to place a retrograde ureteral stent  - CT A/P with contrast - nephrology concerned regarding possible fluid collection around the left kidney, r/o infectious collection  69 year old male with hx of T2DM, HTN, HPL, nephrolithiasis, and left kidney/urothelial cancer with possible metastases to the splenic hilum, liver, invasion to left renal vein and retroperitoneal lymphadenopathy who was sent in by his nephrologist to have a CT A/P with IV Hydration. Urology was originally consulted on 8/2 for malignant L hydronephrosis, during which Urology recommended IR nephrostomy drainage of left kidney given failed ureteral stent exchange by Dr. Gómez at [facility] 2 weeks ago. Urology was reconsulted today regarding hydronephrosis 2/2 malignant obstruction iso persistent YANIRA and leukocytosis.    CTAP w IV contrast from 8/7 reviewed with attendings and demonstrates significant tumor burden in lower pole of kidney but with possible window for nephrostomy tube placement in upper pole; however, the risk of draining the small amount of parenchyma of upper pole likely outweighs the benefits of preserving small remainder of tumor-ridden L renal function. Persistent leukocytosis likely malignancy related as patient continues to be afebrile and CTAP does not note any abscess collections.    Recommendations:  - No emergent urologic intervention  - IR nephrostomy drainage of left kidney may be futile as well  - Remainder of care per primary team    Discussed with Dr. Yulia Melo [place] for Urology  [address] [place] [phone]

## 2024-08-07 NOTE — CONSULT NOTE ADULT - ASSESSMENT
70yo M w/ PMH of T2DM, HTN, HPL, nephrolithiasis, and left kidney/urothelial cancer (s/p carboplatin/gemcitabine, stopped avelumab 1mth ago) complicated by Left sided mild-moderate hydronephrosis for which he had a stent placed (removed and exchange unsuccessful last week) sent to ED on 8/1 by outpt nephrologist Dr. Sen for hyperkalemia (K 6.0), YANIRA (Scr 2.15) and PCN evaluation. Per IR and urology during that hospitalization, would only consider PCN if pt had pyelo or worsening YANIRA, but Cr remained stable at ~2. Pt had persistent fevers but cultures remained negative. Patient was discharged prior to CT A/P with IV contrast, which was recommended by IR. Pt did not want to [place] [facility] [place], but when he went the following day they told him they would not start tx at this time. Therefore, he returned to the ED today to get CT A/P with IV contrast with IVF pre- and post. Labs notable for chronic leukocytosis, stable Cr, mild hyponatremia with Na 128, and lact 3.6 that improved with IVF.       1. CKD III  - Cr stable  - Received IVF pre-and post IV contrast study. Will monitor renal function while admitted  - Per IR and urology, they cannot offer any intervention as hydro is mainly in superior portion of kidney and rest of the kidney is atrophic so not amenable to PCN tube.    2. Hyponatremia  - Na 128, likely iso poor PO intake  - check serum osm, urine osm, urine Na  - repeat BMP tomorrow AM, may improve after IVF given      Samina Lam, PGY-5  Nephrology Fellow  MS TEAMS preferred  (After 4pm or on weekends, please contact the fellow on call).

## 2024-08-07 NOTE — ED PROVIDER NOTE - ATTENDING APP SHARED VISIT CONTRIBUTION OF CARE
I have personally performed a face to face medical and diagnostic evaluation of the patient. I have discussed with and reviewed the Resident's and/or ACP's and/or Medical/PA/NP student's note and agree with the History, ROS, Physical Exam and MDM unless otherwise indicated. A brief summary of my personal evaluation and impression can be found below.     69-year-old male past medical history T2DM, HTN, nephrolithiasis, and left kidney/urothelial cancer (s/p carboplatin/gemcitabine, stopped avelumab 1mth ago) complicated by Left sided mild-moderate hydronephrosis for which he had a stent placed (removed and exchange unsuccessful, no stent currently in place)  recent admission for hypokalemia found on outpatient labs and evaluation for percutaneous nephrostomy drain sent by outpt nephrologist Dr. Sen for  CT scan with IV contrast to further evaluate hydro to decide about percutaneous nephrostomy drain versus stent.  Patient states he has been having subjective fevers at night intermittently for the past few weeks.  No new change since discharged from the hospital.  Patient has no new symptoms at this time and presented for CAT scan.  Denies nausea, vomiting, chest pain, SOB.    GENERAL: Awake. Alert. NAD. Well nourished.  HEENT: NC/AT, Conjunctiva pink, no scleral icterus. Airway patent.   LUNGS: CTAB. No wheezes or rales noted.  CARDIAC: RRR.  ABDOMEN: Soft, NT, ND, no rebound, no guarding.  EXT: No edema, no calf tenderness, distal pulses 2+ bilaterally  NEURO: A&Ox3. Moving all extremities. Sensation and strength intact throughout.   SKIN: Warm and dry.   PSYCH: Normal affect.     Given hx and physical, ddx includes but is not limited to YANIRA, metabolic derangement, UTI, patient with recent sepsis rule out upon last admission, negative blood cultures however patient with intermittent fevers, arrives tachycardic today (patient states this is baseline), will obtain labs and blood cultures for further evaluation.  Plan for labs, EKG, CT, UA, nephrology consult.     Lubna Carlin DO (Attending):  EKG showing normal sinus rhythm, heart rate 91, AK interval 176, QRS 86, QTc 450, no acute ischemic changes

## 2024-08-08 LAB
ALBUMIN SERPL ELPH-MCNC: 2.6 G/DL — LOW (ref 3.3–5)
ALP SERPL-CCNC: 148 U/L — HIGH (ref 40–120)
ALT FLD-CCNC: 30 U/L — SIGNIFICANT CHANGE UP (ref 10–45)
ANION GAP SERPL CALC-SCNC: 13 MMOL/L — SIGNIFICANT CHANGE UP (ref 5–17)
AST SERPL-CCNC: 22 U/L — SIGNIFICANT CHANGE UP (ref 10–40)
BILIRUB SERPL-MCNC: 0.4 MG/DL — SIGNIFICANT CHANGE UP (ref 0.2–1.2)
BLD GP AB SCN SERPL QL: NEGATIVE — SIGNIFICANT CHANGE UP
BUN SERPL-MCNC: 33 MG/DL — HIGH (ref 7–23)
CALCIUM SERPL-MCNC: 9.3 MG/DL — SIGNIFICANT CHANGE UP (ref 8.4–10.5)
CHLORIDE SERPL-SCNC: 97 MMOL/L — SIGNIFICANT CHANGE UP (ref 96–108)
CO2 SERPL-SCNC: 19 MMOL/L — LOW (ref 22–31)
CREAT SERPL-MCNC: 2.1 MG/DL — HIGH (ref 0.5–1.3)
EGFR: 33 ML/MIN/1.73M2 — LOW
FERRITIN SERPL-MCNC: 1467 NG/ML — HIGH (ref 30–400)
FOLATE SERPL-MCNC: 8 NG/ML — SIGNIFICANT CHANGE UP
GLUCOSE BLDC GLUCOMTR-MCNC: 134 MG/DL — HIGH (ref 70–99)
GLUCOSE BLDC GLUCOMTR-MCNC: 145 MG/DL — HIGH (ref 70–99)
GLUCOSE BLDC GLUCOMTR-MCNC: 148 MG/DL — HIGH (ref 70–99)
GLUCOSE BLDC GLUCOMTR-MCNC: 149 MG/DL — HIGH (ref 70–99)
GLUCOSE SERPL-MCNC: 109 MG/DL — HIGH (ref 70–99)
HCT VFR BLD CALC: 22.7 % — LOW (ref 39–50)
HCT VFR BLD CALC: 23.6 % — LOW (ref 39–50)
HGB BLD-MCNC: 6.5 G/DL — CRITICAL LOW (ref 13–17)
HGB BLD-MCNC: 6.9 G/DL — CRITICAL LOW (ref 13–17)
IRON SATN MFR SERPL: 10 % — LOW (ref 16–55)
IRON SATN MFR SERPL: 13 UG/DL — LOW (ref 45–165)
MCHC RBC-ENTMCNC: 23.2 PG — LOW (ref 27–34)
MCHC RBC-ENTMCNC: 23.5 PG — LOW (ref 27–34)
MCHC RBC-ENTMCNC: 28.6 GM/DL — LOW (ref 32–36)
MCHC RBC-ENTMCNC: 29.2 GM/DL — LOW (ref 32–36)
MCV RBC AUTO: 80.5 FL — SIGNIFICANT CHANGE UP (ref 80–100)
MCV RBC AUTO: 81.1 FL — SIGNIFICANT CHANGE UP (ref 80–100)
NRBC # BLD: 0 /100 WBCS — SIGNIFICANT CHANGE UP (ref 0–0)
NRBC # BLD: 0 /100 WBCS — SIGNIFICANT CHANGE UP (ref 0–0)
OSMOLALITY SERPL: 283 MOSMOL/KG — SIGNIFICANT CHANGE UP (ref 280–301)
PLATELET # BLD AUTO: 364 K/UL — SIGNIFICANT CHANGE UP (ref 150–400)
PLATELET # BLD AUTO: 438 K/UL — HIGH (ref 150–400)
POTASSIUM SERPL-MCNC: 4.3 MMOL/L — SIGNIFICANT CHANGE UP (ref 3.5–5.3)
POTASSIUM SERPL-SCNC: 4.3 MMOL/L — SIGNIFICANT CHANGE UP (ref 3.5–5.3)
PROT SERPL-MCNC: 6.7 G/DL — SIGNIFICANT CHANGE UP (ref 6–8.3)
RBC # BLD: 2.8 M/UL — LOW (ref 4.2–5.8)
RBC # BLD: 2.93 M/UL — LOW (ref 4.2–5.8)
RBC # FLD: 17.3 % — HIGH (ref 10.3–14.5)
RBC # FLD: 17.4 % — HIGH (ref 10.3–14.5)
RH IG SCN BLD-IMP: POSITIVE — SIGNIFICANT CHANGE UP
RH IG SCN BLD-IMP: POSITIVE — SIGNIFICANT CHANGE UP
SODIUM SERPL-SCNC: 129 MMOL/L — LOW (ref 135–145)
TIBC SERPL-MCNC: 141 UG/DL — LOW (ref 220–430)
UIBC SERPL-MCNC: 128 UG/DL — SIGNIFICANT CHANGE UP (ref 110–370)
VIT B12 SERPL-MCNC: 705 PG/ML — SIGNIFICANT CHANGE UP (ref 232–1245)
WBC # BLD: 15.97 K/UL — HIGH (ref 3.8–10.5)
WBC # BLD: 18.14 K/UL — HIGH (ref 3.8–10.5)
WBC # FLD AUTO: 15.97 K/UL — HIGH (ref 3.8–10.5)
WBC # FLD AUTO: 18.14 K/UL — HIGH (ref 3.8–10.5)

## 2024-08-08 PROCEDURE — 99232 SBSQ HOSP IP/OBS MODERATE 35: CPT | Mod: GC

## 2024-08-08 RX ADMIN — PANTOPRAZOLE SODIUM 40 MILLIGRAM(S): 20 TABLET, DELAYED RELEASE ORAL at 06:20

## 2024-08-08 NOTE — POST DISCHARGE NOTE - NOTIFICATION:
Patient's CT scan reflected on the Radiology Incidental Findings report for today. Patient is already being navigated by RN, Ariana Tanner for Urology Navigation.

## 2024-08-08 NOTE — PROVIDER CONTACT NOTE (OTHER) - BACKGROUND
Pt had a hemoglobin of 6.5 this morning. Provider ordered a repeat CBC stat. Pt refused blood draw. Pt states " I find it unnecessary since my blood levels have been this low before."

## 2024-08-08 NOTE — PATIENT PROFILE ADULT - FALL HARM RISK - HARM RISK INTERVENTIONS

## 2024-08-08 NOTE — PROGRESS NOTE ADULT - ATTENDING COMMENTS
Patient seen and examined with fellow, Agree with A/P as above.    Please start salt tabs as above for chronic hyponatremia/SIADH.  no renal objection to d/c planning if no intervention to be performed.  Patient should follow up with Dr. Angelita Sen, [facility] Division of Kidney Disease and Hypertension, in ~1 month:    [address] , [address]  [place], [place] [id number] [phone]

## 2024-08-08 NOTE — CONSULT NOTE ADULT - ASSESSMENT
Patient is a 69 year old male with PMHx of T2DM, HTN, HPL, Nephrolithiasis and Left Kidney/Urothelial Cancer c/b left sided mild-moderate hydronephrosis    Metastatic urothelial cancer  --Previously on gem carbo x 6 through 12/2023 -> avelumab since 2/2024 then held since 6/18/24 due to CRI, anemia, dizziness. PET 7/2024 shows PD at multiple sites. Planned for treatment with enfortumab vedotin.   --[place] [facility]  --No plan for treatment while admitted    Anemia   --Multifactorial d/t malignancy, treatment, CKD  --Hgb 7.5-9.5 at baseline  --s/p Venofer on 5/31, 7/2, 7/16 at [facility] [place].  --will monitor     Hydronephrosis  YANIRA  --2/2 disease burden  --CT a/p w/ Large infiltrative left renal mass consistent with history of urothelial cancer. Tumoral invasion of the left renal vein. Perinephric implants and retroperitoneal lymphadenopathy. A 3.2 cm implant in the splenic hilum. Right-sided liver lesions suspicious for metastatic disease. Left upper pole hydronephrosis.  --urology and renal following  --per Urology and IR no intervention to be offered as hydro is mainly in superior portion of kidney and rest of the kidney is atrophic so not amenable to PCN tube.    Leukocytosis  --chronic, wbc 12-19 at baseline  --ID following, infectious w/up in progress  --CXR w/o PNA  --monitoring off abx    will follow,    Samaria Dangelo NP  Hematology/ Oncology  [facility]  [phone] (office)  [phone] (alt office)  Evenings and weekends please call MD on call or office     Patient is a 69 year old male with PMHx of T2DM, HTN, HPL, Nephrolithiasis and Left Kidney/Urothelial Cancer c/b left sided mild-moderate hydronephrosis    Metastatic urothelial cancer  --Previously on gem carbo x 6 through 12/2023 -> avelumab since 2/2024 then held since 6/18/24 due to CRI, anemia, dizziness. PET 7/2024 shows PD at multiple sites. Planned for treatment with enfortumab vedotin.   --[place] [facility]  --No plan for treatment while admitted    Anemia   --Multifactorial d/t malignancy, treatment, CKD  --Hgb 7.5-9.5 at baseline  --s/p Venofer on 5/31, 7/2, 7[place]16 [place] [facility].  --will get 1u PRBCs for Hgb 6.5    Hydronephrosis  YANIRA  --2/2 disease burden  --CT a/p w/ Large infiltrative left renal mass consistent with history of urothelial cancer. Tumoral invasion of the left renal vein. Perinephric implants and retroperitoneal lymphadenopathy. A 3.2 cm implant in the splenic hilum. Right-sided liver lesions suspicious for metastatic disease. Left upper pole hydronephrosis.  --urology and renal following  --per Urology and IR no intervention to be offered as hydro is mainly in superior portion of kidney and rest of the kidney is atrophic so not amenable to PCN tube.    Leukocytosis  --chronic, wbc 12-19 at baseline  --ID following, infectious w/up in progress  --CXR w/o PNA  --monitoring off abx    will follow,    Samaria Dangelo NP  Hematology/ Oncology  [facility]  [phone] (office)  [phone] (alt office)  Evenings and weekends please call MD on call or office

## 2024-08-08 NOTE — CONSULT NOTE ADULT - NS ATTEND AMEND GEN_ALL_CORE FT
Agree with above assessment and plan.   Urothelial carcinoma plan for next line of therapy with enfortumab. He has CT A/P completed showing large infiltrative kidney mass with renal vein invasion. Known liver mets. Hemoglobin 6.5 today, needs PRBC. Once optimized can be followed outpatient with Dr Herring with [facility] for further therapy. Discussed at length with patient and wife at bedside including enfortumab treatment, prognosis etc. No plans for treatment while inpatient.

## 2024-08-08 NOTE — CONSULT NOTE ADULT - REASON FOR ADMISSION
CT Kidneys with IV Contrast and IV Hydration

## 2024-08-08 NOTE — CONSULT NOTE ADULT - SUBJECTIVE AND OBJECTIVE BOX
Reason for consult: urothelial ca    HPI:  Patient is a 69 year old male with PMHx of T2DM, HTN, HPL, Nephrolithiasis and Left Kidney/Urothelial Cancer c/b left sided mild-moderate hydronephrosis. Presents to [facility] sent by outpt nephrologist Dr. Sen for CT of Kidneys with IV contrast and IV hydration before and after. Patient states he was feeling in his normal state of health and was due for his first recurrent chemo treatment this AM at [facility] however it was canceled due to the fact that [facility] thought patient was still admitted. Patient then spoke to his Nephrologist who was unable to get patient to an ambulatory setting for CT with IV hydrations and recommended he come to the ED.     Hematology/Oncology called to see patient who follows with Dr Herring of [facility] for the management of urothelial ca      PAST MEDICAL & SURGICAL HISTORY:  Coronary artery disease      Hypertension      Hyperlipidemia      Asthma  Denies use of Inhaler at this time      Anxiety      Glaucoma  bilat. " elevated pressure"- notes eye drops have been stopped for a year now      Herniated disc      Bilateral carpal tunnel syndrome      Neuropathy  fingers ,hands ,feet-bilateral      Kidney stone on right side      Type 2 diabetes mellitus      Stented coronary artery  2010 RIGHT Coronary Artery Stent      S/P shoulder surgery  , - LEFT Shoulder      S/P hernia surgery        S/P angioplasty with stent  x 1       Tendon laceration  left hand       H/O colonoscopy          FAMILY HISTORY:  Family history of Alzheimer's disease (Mother)  Mother -  Age 82    Family history of type 2 diabetes mellitus in father (Father)  Father - Age 80    FH: COPD (chronic obstructive pulmonary disease) (Father)  Father -  Age 80        Alochol: Denied  Smoking: Nonsmoker  Drug Use: Denied  Marital Status:         Allergies    No Known Allergies    Intolerances        MEDICATIONS  (STANDING):  atorvastatin 20 milliGRAM(s) Oral daily  dextrose 5%. 1000 milliLiter(s) (50 mL/Hr) IV Continuous <Continuous>  dextrose 5%. 1000 milliLiter(s) (100 mL/Hr) IV Continuous <Continuous>  dextrose 50% Injectable 12.5 Gram(s) IV Push once  dextrose 50% Injectable 25 Gram(s) IV Push once  dextrose 50% Injectable 25 Gram(s) IV Push once  glucagon  Injectable 1 milliGRAM(s) IntraMuscular once  insulin lispro (ADMELOG) corrective regimen sliding scale   SubCutaneous three times a day before meals  magnesium oxide 400 milliGRAM(s) Oral daily  pantoprazole    Tablet 40 milliGRAM(s) Oral before breakfast    MEDICATIONS  (PRN):  acetaminophen     Tablet .. 650 milliGRAM(s) Oral every 6 hours PRN Temp greater or equal to 38C (100.4F), Mild Pain (1 - 3)  aluminum hydroxide/magnesium hydroxide/simethicone Suspension 30 milliLiter(s) Oral every 4 hours PRN Dyspepsia  dextrose Oral Gel 15 Gram(s) Oral once PRN Blood Glucose LESS THAN 70 milliGRAM(s)/deciliter  melatonin 3 milliGRAM(s) Oral at bedtime PRN Insomnia  ondansetron Injectable 4 milliGRAM(s) IV Push every 8 hours PRN Nausea and/or Vomiting      ROS  No fever, sweats, chills  No epistaxis, HA, sore throat  No CP, SOB, cough, sputum  No n/v/d, abd pain, melena, hematochezia  No edema  No rash  No anxiety  No back pain, joint pain  No bleeding, bruising  No dysuria, hematuria    T(C): 36.1 (24 @ 05:13), Max: 37.5 (24 @ 08:21)  HR: 70 (24 @ 05:13) (70 - 117)  BP: 99/63 (24 @ 05:13) (99/63 - 124/73)  RR: 20 (24 @ 05:13) (18 - 24)  SpO2: 100% (24 @ 05:13) (98% - 100%)  Wt(kg): --    PE  NAD  Awake, alert  Anicteric, MMM  RRR  CTAB  Abd soft, NT, ND  No c/c/e  No rash grossly  FROM                          8.1    19.80 )-----------( 466      ( 07 Aug 2024 09:54 )             27.0       08-07    128<L>  |  94<L>  |  36<H>  ----------------------------<  206<H>  4.1   |  16<L>  |  2.13<H>    Ca    10.0      07 Aug 2024 09:54    TPro  7.9  /  Alb  2.8<L>  /  TBili  0.4  /  DBili  x   /  AST  24  /  ALT  35  /  AlkPhos  174<H>      ACC: [id number] EXAM:  CT ABDOMEN AND PELVIS IC   ORDERED BY: JOCE WOLFE     PROCEDURE DATE:  2024          INTERPRETATION:  CLINICAL INFORMATION: eval for hydro, abscess MCT    COMPARISON: 24.    CONTRAST/COMPLICATIONS:  IV Contrast: Omnipaque 350  90 cc administered   10 cc discarded  Oral Contrast: NONE  Complications: None reported at time of study completion    PROCEDURE:  CT of the Abdomen and Pelvis was performed.  Sagittal and coronal reformats were performed.    FINDINGS:    LOWER CHEST: Within normal limits.    LIVER: A 4 x 3 cm right lobe lesion. A 1.6 x 1.4 cm right lobe lesion.  BILE DUCTS: Normal caliber.  GALLBLADDER: Within normal limits.  SPLEEN: A 3.2 cm mass in the splenic hilum.  PANCREAS: Within normal limits.  ADRENALS: Within normal limits.  KIDNEYS/URETERS: Cysts and other lesions too small to characterize.   Indeterminate 1.3 cm right upper pole renal lesion. Subcentimeter right   renal calculi.    There is a large infiltrative mass involving the left mid and lower   kidney with effacement of the collecting system measuring 7 cm. There is   tumor thrombus in the involving the left renal vein best seen on coronal   image 61. There is moderate hydronephrosis of the right upper pole   collecting system. There are left-sided perinephric nodules including a   1.5 cm posterior nodule on image 49. There is a 2.9 x 2.1 cm left   para-aortic node. Additional smaller nodes are noted.    BLADDER: Within normal limits.  REPRODUCTIVE ORGANS: The prostate is enlarged and heterogeneous.    BOWEL: No bowel obstruction.  PERITONEUM: No ascites.  VESSELS:  Within normal limits.  RETROPERITONEUM/LYMPH NODES: See above.  ABDOMINAL WALL: Small right inguinal hernia.  BONES: Within normal limits.    IMPRESSION: Large infiltrative left renal mass consistent with history of   urothelial cancer.    Tumoral invasion of the left renal vein.    Perinephric implants and retroperitoneal lymphadenopathy.    A 3.2 cm implant in the splenic hilum.    Right-sided liver lesions suspicious for metastatic disease.    Left upper pole hydronephrosis.    Enlarged heterogeneous prostate.    --- End of Report ---

## 2024-08-09 ENCOUNTER — TRANSCRIPTION ENCOUNTER (OUTPATIENT)
Age: 69
End: 2024-08-09

## 2024-08-09 VITALS
RESPIRATION RATE: 18 BRPM | SYSTOLIC BLOOD PRESSURE: 113 MMHG | OXYGEN SATURATION: 97 % | DIASTOLIC BLOOD PRESSURE: 72 MMHG | TEMPERATURE: 98 F | HEART RATE: 87 BPM

## 2024-08-09 DIAGNOSIS — N17.9 ACUTE KIDNEY FAILURE, UNSPECIFIED: ICD-10-CM

## 2024-08-09 DIAGNOSIS — N20.0 CALCULUS OF KIDNEY: ICD-10-CM

## 2024-08-09 DIAGNOSIS — E11.9 TYPE 2 DIABETES MELLITUS WITHOUT COMPLICATIONS: ICD-10-CM

## 2024-08-09 DIAGNOSIS — I10 ESSENTIAL (PRIMARY) HYPERTENSION: ICD-10-CM

## 2024-08-09 LAB
ANION GAP SERPL CALC-SCNC: 15 MMOL/L — SIGNIFICANT CHANGE UP (ref 5–17)
BASOPHILS # BLD AUTO: 0 K/UL — SIGNIFICANT CHANGE UP (ref 0–0.2)
BASOPHILS NFR BLD AUTO: 0 % — SIGNIFICANT CHANGE UP (ref 0–2)
BUN SERPL-MCNC: 28 MG/DL — HIGH (ref 7–23)
CALCIUM SERPL-MCNC: 9.8 MG/DL — SIGNIFICANT CHANGE UP (ref 8.4–10.5)
CHLORIDE SERPL-SCNC: 97 MMOL/L — SIGNIFICANT CHANGE UP (ref 96–108)
CO2 SERPL-SCNC: 20 MMOL/L — LOW (ref 22–31)
CREAT SERPL-MCNC: 2.05 MG/DL — HIGH (ref 0.5–1.3)
EGFR: 34 ML/MIN/1.73M2 — LOW
EOSINOPHIL # BLD AUTO: 0 K/UL — SIGNIFICANT CHANGE UP (ref 0–0.5)
EOSINOPHIL NFR BLD AUTO: 0 % — SIGNIFICANT CHANGE UP (ref 0–6)
GIANT PLATELETS BLD QL SMEAR: PRESENT — SIGNIFICANT CHANGE UP
GLUCOSE BLDC GLUCOMTR-MCNC: 122 MG/DL — HIGH (ref 70–99)
GLUCOSE BLDC GLUCOMTR-MCNC: 153 MG/DL — HIGH (ref 70–99)
GLUCOSE SERPL-MCNC: 99 MG/DL — SIGNIFICANT CHANGE UP (ref 70–99)
HCT VFR BLD CALC: 25.3 % — LOW (ref 39–50)
HGB BLD-MCNC: 7.7 G/DL — LOW (ref 13–17)
LYMPHOCYTES # BLD AUTO: 15.7 % — SIGNIFICANT CHANGE UP (ref 13–44)
LYMPHOCYTES # BLD AUTO: 2.6 K/UL — SIGNIFICANT CHANGE UP (ref 1–3.3)
MANUAL SMEAR VERIFICATION: SIGNIFICANT CHANGE UP
MCHC RBC-ENTMCNC: 24.1 PG — LOW (ref 27–34)
MCHC RBC-ENTMCNC: 30.4 GM/DL — LOW (ref 32–36)
MCV RBC AUTO: 79.1 FL — LOW (ref 80–100)
MONOCYTES # BLD AUTO: 1.72 K/UL — HIGH (ref 0–0.9)
MONOCYTES NFR BLD AUTO: 10.4 % — SIGNIFICANT CHANGE UP (ref 2–14)
NEUTROPHILS # BLD AUTO: 12.22 K/UL — HIGH (ref 1.8–7.4)
NEUTROPHILS NFR BLD AUTO: 69.6 % — SIGNIFICANT CHANGE UP (ref 43–77)
NEUTS BAND # BLD: 4.3 % — SIGNIFICANT CHANGE UP (ref 0–8)
PLAT MORPH BLD: ABNORMAL
PLATELET # BLD AUTO: 406 K/UL — HIGH (ref 150–400)
POTASSIUM SERPL-MCNC: 4.3 MMOL/L — SIGNIFICANT CHANGE UP (ref 3.5–5.3)
POTASSIUM SERPL-SCNC: 4.3 MMOL/L — SIGNIFICANT CHANGE UP (ref 3.5–5.3)
RBC # BLD: 3.2 M/UL — LOW (ref 4.2–5.8)
RBC # FLD: 17.5 % — HIGH (ref 10.3–14.5)
RBC BLD AUTO: SIGNIFICANT CHANGE UP
SODIUM SERPL-SCNC: 132 MMOL/L — LOW (ref 135–145)
WBC # BLD: 16.54 K/UL — HIGH (ref 3.8–10.5)
WBC # FLD AUTO: 16.54 K/UL — HIGH (ref 3.8–10.5)

## 2024-08-09 PROCEDURE — 36415 COLL VENOUS BLD VENIPUNCTURE: CPT

## 2024-08-09 PROCEDURE — 84300 ASSAY OF URINE SODIUM: CPT

## 2024-08-09 PROCEDURE — 86920 COMPATIBILITY TEST SPIN: CPT

## 2024-08-09 PROCEDURE — 83605 ASSAY OF LACTIC ACID: CPT

## 2024-08-09 PROCEDURE — 85018 HEMOGLOBIN: CPT

## 2024-08-09 PROCEDURE — 82947 ASSAY GLUCOSE BLOOD QUANT: CPT

## 2024-08-09 PROCEDURE — 96374 THER/PROPH/DIAG INJ IV PUSH: CPT

## 2024-08-09 PROCEDURE — 82607 VITAMIN B-12: CPT

## 2024-08-09 PROCEDURE — 86906 BLD TYPING SEROLOGIC RH PHNT: CPT

## 2024-08-09 PROCEDURE — 86850 RBC ANTIBODY SCREEN: CPT

## 2024-08-09 PROCEDURE — 80053 COMPREHEN METABOLIC PANEL: CPT

## 2024-08-09 PROCEDURE — 85730 THROMBOPLASTIN TIME PARTIAL: CPT

## 2024-08-09 PROCEDURE — 84295 ASSAY OF SERUM SODIUM: CPT

## 2024-08-09 PROCEDURE — 83935 ASSAY OF URINE OSMOLALITY: CPT

## 2024-08-09 PROCEDURE — 87040 BLOOD CULTURE FOR BACTERIA: CPT

## 2024-08-09 PROCEDURE — 84132 ASSAY OF SERUM POTASSIUM: CPT

## 2024-08-09 PROCEDURE — 82330 ASSAY OF CALCIUM: CPT

## 2024-08-09 PROCEDURE — 74177 CT ABD & PELVIS W/CONTRAST: CPT | Mod: MC

## 2024-08-09 PROCEDURE — 85610 PROTHROMBIN TIME: CPT

## 2024-08-09 PROCEDURE — 82746 ASSAY OF FOLIC ACID SERUM: CPT

## 2024-08-09 PROCEDURE — 85027 COMPLETE CBC AUTOMATED: CPT

## 2024-08-09 PROCEDURE — 87637 SARSCOV2&INF A&B&RSV AMP PRB: CPT

## 2024-08-09 PROCEDURE — 81001 URINALYSIS AUTO W/SCOPE: CPT

## 2024-08-09 PROCEDURE — 82962 GLUCOSE BLOOD TEST: CPT

## 2024-08-09 PROCEDURE — 83930 ASSAY OF BLOOD OSMOLALITY: CPT

## 2024-08-09 PROCEDURE — 85014 HEMATOCRIT: CPT

## 2024-08-09 PROCEDURE — 71045 X-RAY EXAM CHEST 1 VIEW: CPT

## 2024-08-09 PROCEDURE — 99285 EMERGENCY DEPT VISIT HI MDM: CPT

## 2024-08-09 PROCEDURE — 80048 BASIC METABOLIC PNL TOTAL CA: CPT

## 2024-08-09 PROCEDURE — 82728 ASSAY OF FERRITIN: CPT

## 2024-08-09 PROCEDURE — 82803 BLOOD GASES ANY COMBINATION: CPT

## 2024-08-09 PROCEDURE — 83550 IRON BINDING TEST: CPT

## 2024-08-09 PROCEDURE — 86900 BLOOD TYPING SEROLOGIC ABO: CPT

## 2024-08-09 PROCEDURE — 83540 ASSAY OF IRON: CPT

## 2024-08-09 PROCEDURE — 85025 COMPLETE CBC W/AUTO DIFF WBC: CPT

## 2024-08-09 PROCEDURE — P9040: CPT

## 2024-08-09 PROCEDURE — 82435 ASSAY OF BLOOD CHLORIDE: CPT

## 2024-08-09 PROCEDURE — 36430 TRANSFUSION BLD/BLD COMPNT: CPT

## 2024-08-09 PROCEDURE — 86901 BLOOD TYPING SEROLOGIC RH(D): CPT

## 2024-08-09 RX ORDER — BACTERIOSTATIC SODIUM CHLORIDE 0.9 %
1 VIAL (ML) INJECTION
Qty: 45 | Refills: 0
Start: 2024-08-09 | End: 2024-08-23

## 2024-08-09 RX ORDER — BACTERIOSTATIC SODIUM CHLORIDE 0.9 %
1 VIAL (ML) INJECTION THREE TIMES A DAY
Refills: 0 | Status: DISCONTINUED | OUTPATIENT
Start: 2024-08-09 | End: 2024-08-09

## 2024-08-09 RX ADMIN — MAGNESIUM OXIDE 400 MILLIGRAM(S): 253 TABLET ORAL at 12:42

## 2024-08-09 RX ADMIN — Medication 1 GRAM(S): at 12:40

## 2024-08-09 RX ADMIN — PANTOPRAZOLE SODIUM 40 MILLIGRAM(S): 20 TABLET, DELAYED RELEASE ORAL at 06:21

## 2024-08-09 RX ADMIN — Medication 1: at 12:33

## 2024-08-09 RX ADMIN — ATORVASTATIN CALCIUM 20 MILLIGRAM(S): 40 TABLET, FILM COATED ORAL at 12:41

## 2024-08-09 NOTE — DIETITIAN INITIAL EVALUATION ADULT - ORAL INTAKE PTA/DIET HISTORY
Patient and wife reports patient with good appetite and PO intake PTA, patient eating 3 meals/day. Reports no dietary restrictions followed PTA. Wife notes they were told to follow low potassium diet. Not drinking any oral nutrition supplements PTA. Confirms no known food allergies.

## 2024-08-09 NOTE — PROGRESS NOTE ADULT - ASSESSMENT
70yo M w/ PMH of T2DM, HTN, HPL, nephrolithiasis, and left kidney/urothelial cancer (s/p carboplatin/gemcitabine, stopped avelumab 1mth ago) complicated by Left sided mild-moderate hydronephrosis for which he had a stent placed (removed and exchange unsuccessful last week) sent to ED on 8/1 by outpt nephrologist Dr. Sen for hyperkalemia (K 6.0), YANIRA (Scr 2.15) and PCN evaluation. Per IR and urology during that hospitalization, would only consider PCN if pt had pyelo or worsening YANIRA, but Cr remained stable at ~2. Pt had persistent fevers but cultures remained negative. Patient was discharged prior to CT A/P with IV contrast, which was recommended by IR. Pt did not want to miss his [facility] appt, but when he went the following day they told him they would not start tx at this time. Therefore, he returned to the ED today to get CT A/P with IV contrast with IVF pre- and post. Labs notable for chronic leukocytosis, stable Cr, mild hyponatremia with Na 128, and lact 3.6 that improved with IVF.       1. CKD III  - Cr stable  - Received IVF pre-and post IV contrast study. Will monitor renal function while admitted  - Per IR and urology, they cannot offer any intervention as hydro is mainly in superior portion of kidney and rest of the kidney is atrophic so not amenable to PCN tube.    2. Hyponatremia  - Na 128, likely iso poor PO intake  - check serum osm, urine osm, urine Na  - repeat BMP today shows Na 129  - start salt tabs 1g TID and continue on discharge    From renal standpoint, there are no contraindications for discharge since no intervention is being offered. Please confirm with IR/urology prior to discharging.    Samina Lam, PGY-5  Nephrology Fellow  MS TEAMS preferred  (After 4pm or on weekends, please contact the fellow on call). 
Patient is a 69 year old male with PMH of T2DM, HTN, HPL, Nephrolithiasis and Left Kidney/Urothelial metastatic cancer with heavy tumor burden in the L kidney, c/b left sided mild-moderate hydronephrosis with inability to place retrograde ureteral stent who was sent to [facility] by outpt nephrologist Dr. Sen for CT of Kidneys with IV contrast and IV hydration before and after. Patient states he was feeling in his normal state of health and was due for his first recurrent chemo treatment this AM at [facility] however it was canceled due to the fact that [facility] thought patient was still admitted. Patient then spoke to his Nephrologist who was unable to get patient to an ambulatory setting for CT with IV hydrations and recommended he come to the ED.     Leukocytosis - chronic per review  suspect likely malignancy related  WBC trend noted, remains afebrile, nontoxic  CXR with no pneumonia   exam with no new focal findings  abd soft, nontender   s/p zosyn x1 in the ER  CTAP with large infiltrative L renal mass, tumor invasion of L renal vein, LAD, liver mets, L upper pole hydronephrosis, no abscess   Per IR and Urology, hydronephrosis in upper part of kidney and rest of kidney is atrophic, so not amenable to PCN  Nephrology, Heme/Onc following  no infectious source found to date     Recommendations:   Continue off antibiotics   Continue rest of care per primary team     Stable from ID standpoint at this time.   Discharge planning per primary team.   Please call if any questions, thank you     Talib Brooke M.D.  [facility], Division of Infectious Diseases  [phone]  After 5pm on weekdays and all day on weekends - please call [phone]  Available on [employer] TEAMS 
Patient is a 69 year old male with PMHx of T2DM, HTN, HPL, Nephrolithiasis and Left Kidney/Urothelial Cancer c/b left sided mild-moderate hydronephrosis    Metastatic urothelial cancer  --Previously on gem carbo x 6 through 12/2023 -> avelumab since 2/2024 then held since 6/18/24 due to CRI, anemia, dizziness. PET 7/2024 shows PD at multiple sites. Planned for treatment with enfortumab vedotin.   --Follows with Dr. Herring at [facility].  --No plan for treatment while admitted    Anemia   --Multifactorial d/t malignancy, treatment, CKD  --Hgb 7.5-9.5 at baseline  --s/p Venofer on 5/31, 7/2, 7/16 at [facility].  --s/p 1u PRBCs for Hgb 6.5 on 8/8    Hydronephrosis  YANIRA  --2/2 disease burden  --CT a/p w/ Large infiltrative left renal mass consistent with history of urothelial cancer. Tumoral invasion of the left renal vein. Perinephric implants and retroperitoneal lymphadenopathy. A 3.2 cm implant in the splenic hilum. Right-sided liver lesions suspicious for metastatic disease. Left upper pole hydronephrosis.  --urology and renal following  --per Urology and IR no intervention to be offered as hydro is mainly in superior portion of kidney and rest of the kidney is atrophic so not amenable to PCN tube.    Leukocytosis  --chronic, wbc 12-19 at baseline  --ID following, infectious w/up in progress  --CXR w/o PNA  --monitoring off abx    Patient cleared for discharge from a heme/onc perspective    Samaria Dangelo NP  Hematology/ Oncology  [facility]  [phone] (office)  [phone] (alt office)  Evenings and weekends please call MD on call or office    
Patient is a 69 year old male with PMHx of T2DM, HTN, HPL, Nephrolithiasis and Left Kidney/Urothelial Cancer c/b left sided mild-moderate hydronephrosis. Presents to [facility] sent by outpt nephrologist Dr. Sen for CT of Kidneys with IV contrast and IV hydration before and after.    Plan:    # Leukocytosis likely 2/2 malignancy:  - Afebrile  - Lactate 3.6  - Fu BCx (in progress), UCx pending  - CXR with no focal consolidation  - CT A/P with large infiltrative left renal mass consistent with history of urothelial cancer. Tumoral invasion of the left renal vein. Perinephric implants and retroperitoneal lymphadenopathy. A 3.2 cm implant in the splenic hilum. Right-sided liver lesions suspicious for metastatic disease. Left upper pole hydronephrosis.  Enlarged heterogeneous prostate  - As per IR and Urology -> cannot offer any intervention as hydro is mainly in superior portion of kidney and rest of the kidney is atrophic so not amenable to PCN tube  - sp IV abx x1 in ED  - Monitor off abx per ID -> if any fevers or worsening start Zosyn   - Monitor temps/WBC  - ID following    # Anemia:  - Monitor CBC  - Transfuse PRN    # YANIRA/Hyponatremia:  - Na 128 on admission likely 2/2 poor PO intake  - Renal following    # Nephrolithiasis and Left Kidney/Urothelial Cancer:  - Metastatic cancer with heavy tumor burden in the left kidney causing inability to place a retrograde ureteral stent  - Fu CT A/P-> nephrology concerned regarding possible fluid collection around the left kidney, r/o infectious collection   - [place] [facility]  [place] Heme consult    # DM2:  - HgbA1c 6.6% (from previous admission last week)  - Continue to monitor blood glucose levels  - Sliding Scale    # HTN:  - Trend BP, currently off BP meds    # GI ppx:  - PPI    # DVT ppx:  - IPCs    [employer]  [phone]    
Patient is a 69 year old male with PMHx of T2DM, HTN, HPL, Nephrolithiasis and Left Kidney/Urothelial Cancer c/b left sided mild-moderate hydronephrosis. Presents to [facility] sent by outpt nephrologist Dr. Sen for CT of Kidneys with IV contrast and IV hydration before and after.    Plan:    # Leukocytosis likely 2/2 malignancy:  - Afebrile  - Lactate 3.6  - BCx NGTD x24H, UCx pending  - CXR with no focal consolidation  - CT A/P with large infiltrative left renal mass consistent with history of urothelial cancer. Tumoral invasion of the left renal vein. Perinephric implants and retroperitoneal lymphadenopathy. A 3.2 cm implant in the splenic hilum. Right-sided liver lesions suspicious for metastatic disease. Left upper pole hydronephrosis.  Enlarged heterogeneous prostate  - As per IR and Urology -> cannot offer any intervention as hydro is mainly in superior portion of kidney and rest of the kidney is atrophic so not amenable to PCN tube  - sp IV abx x1 in ED  - Monitor off abx per ID -> if any fevers or worsening start Zosyn   - Monitor temps/WBC  - ID following    # Anemia:  - Monitor CBC  - Transfuse PRN    # YANIRA/Hyponatremia:  - Na 128 on admission likely 2/2 poor PO intake  - Salt tabs 1G TID started per Renal recs -> to be continued on DC  - No objection on DC from renal stand point 2/2 no intervention being offered   - Renal following    # Nephrolithiasis and Left Kidney/Urothelial Cancer:  - Metastatic cancer with heavy tumor burden in the left kidney causing inability to place a retrograde ureteral stent  - Fu CT A/P-> nephrology concerned regarding possible fluid collection around the left kidney, r/o infectious collection   - Follows with [facility]  - Heme following     # DM2:  - HgbA1c 6.6% (from previous admission last week)  - Continue to monitor blood glucose levels  - Sliding Scale    # HTN:  - Trend BP, currently off BP meds    # GI ppx:  - PPI    # DVT ppx:  - IPCs    [employer]  [phone]    
Patient is a 69 year old male with PMH of T2DM, HTN, HPL, Nephrolithiasis and Left Kidney/Urothelial metastatic cancer with heavy tumor burden in the L kidney, c/b left sided mild-moderate hydronephrosis with inability to place retrograde ureteral stent who was sent to [facility] by outpt nephrologist Dr. Sen for CT of Kidneys with IV contrast and IV hydration before and after. Patient states he was feeling in his normal state of health and was due for his first recurrent chemo treatment this AM at [facility] however it was canceled due to the fact that [facility] thought patient was still admitted. Patient then spoke to his Nephrologist who was unable to get patient to an ambulatory setting for CT with IV hydrations and recommended he come to the ED.     Leukocytosis - chronic per review  suspect likely malignancy related  WBC trend noted, remains afebrile, nontoxic  CXR with no pneumonia   exam with no new focal findings  abd soft, nontender   s/p zosyn x1 in the ER  CTAP with large infiltrative L renal mass, tumor invasion of L renal vein, LAD, liver mets, L upper pole hydronephrosis, no abscess   Per IR and Urology, hydronephrosis in upper part of kidney and rest of kidney is atrophic, so not amenable to PCN  Nephrology, Heme/Onc following  no infectious source found to date     Recommendations:   Continue off antibiotics   Continue rest of care per primary team     Stable from ID standpoint at this time.   Discharge planning per primary team.   Please call if any questions, thank you     Talib Brooke M.D.  [facility], Division of Infectious Diseases  [phone]  After 5pm on weekdays and all day on weekends - please call [phone]  Available on [employer] TEAMS

## 2024-08-09 NOTE — DISCHARGE NOTE PROVIDER - CARE PROVIDER_API CALL
Charly Sena   Internal Medicine  [address] [place] [id number]  Phone: [phone]  Fax: [phone]  Follow Up Time:

## 2024-08-09 NOTE — DIETITIAN INITIAL EVALUATION ADULT - LITERATURE/VIDEOS GIVEN
Chronic Kidney Disease Stage 3-5 Nutrition Therapy, Potassium Content of Foods, Phosphorous Content of Foods

## 2024-08-09 NOTE — DISCHARGE NOTE NURSING/CASE MANAGEMENT/SOCIAL WORK - PATIENT PORTAL LINK FT
You can access the FollowMyHealth Patient Portal offered by [facility] by registering at the following website: [email or web address]. By joining [facility]’s FollowMyHealth portal, you will also be able to view your health information using other applications (apps) compatible with our system.

## 2024-08-09 NOTE — PROGRESS NOTE ADULT - REASON FOR ADMISSION
CT Kidneys with IV Contrast and IV Hydration

## 2024-08-09 NOTE — DIETITIAN INITIAL EVALUATION ADULT - OTHER CALCULATIONS
Defer fluid needs to team.  Estimated nutrient needs based on IBW 166lb, with consideration for metastatic cancer, CKD3

## 2024-08-09 NOTE — DIETITIAN INITIAL EVALUATION ADULT - PERTINENT LABORATORY DATA
08-09    132<L>  |  97  |  28<H>  ----------------------------<  99  4.3   |  20<L>  |  2.05<H>    Ca    9.8      09 Aug 2024 07:10    TPro  6.7  /  Alb  2.6<L>  /  TBili  0.4  /  DBili  x   /  AST  22  /  ALT  30  /  AlkPhos  148<H>  08-08    POCT Blood Glucose.: 122 mg/dL (09 Aug 2024 08:15)  POCT Blood Glucose.: 145 mg/dL (08 Aug 2024 21:43)  POCT Blood Glucose.: 148 mg/dL (08 Aug 2024 18:13)    A1C with Estimated Average Glucose Result: 6.6 % (08-03-24 @ 06:57)

## 2024-08-09 NOTE — DISCHARGE NOTE PROVIDER - HOSPITAL COURSE
HPI:  Patient is a 69 year old male with PMHx of T2DM, HTN, HPL, Nephrolithiasis and Left Kidney/Urothelial Cancer c/b left sided mild-moderate hydronephrosis. Presents to [facility] sent by outpt nephrologist Dr. Sen for CT of Kidneys with IV contrast and IV hydration before and after. Patient states he was feeling in his normal state of health and was due for his first recurrent chemo treatment this AM at [facility] however it was canceled due to the fact that [facility] thought patient was still admitted. Patient then spoke to his Nephrologist who was unable to get patient to an ambulatory setting for CT with IV hydrations and recommended he come to the ED.      (07 Aug 2024 13:51)    Hospital Course:  # Leukocytosis likely 2/2 malignancy - Afebrile, Lactate 3.6, BCx NGTD x24H, UCx pending, s/p abx x1 in ED   - CXR with no focal consolidation, CT A/P with large infiltrative left renal mass consistent with history of urothelial cancer. Tumoral invasion of the left renal vein. Perinephric implants and retroperitoneal lymphadenopathy. A 3.2 cm implant in the splenic hilum. Right-sided liver lesions suspicious for metastatic disease. Left upper pole hydronephrosis.  Enlarged heterogeneous prostate  - As per IR and Urology -> cannot offer any intervention as hydro is mainly in superior portion of kidney and rest of the kidney is atrophic so not amenable to PCN tube  - Monitor off abx per ID -> if any fevers or worsening start Zosyn and monnitored temps/WBC  - ID following    # Anemia - Monitor CBC and transfused PRN    # YANIRA / Hyponatremia - Na 128 on admission likely 2/2 poor PO intake  - Salt tabs 1G TID started per Renal recs -> to be continued on DC  - No objection on DC from renal stand point 2/2 no intervention being offered and Renal followed throughout     # Nephrolithiasis and Left Kidney/Urothelial Cancer - Metastatic cancer with heavy tumor burden in the left kidney causing inability to place a retrograde ureteral stent  - Fu CT A/P-> nephrology concerned regarding possible fluid collection around the left kidney, r/o infectious collection   - Follows with [facility] and Heme followed in-pt      # DM2 - HgbA1c 6.6% (from previous admission last week), monitored blood glucose levels and sliding scale    # HTN - trended BP, currently off BP meds    # GI ppx - PPI    # DVT ppx - IPC's      Important Medication Changes and Reason:    Active or Pending Issues Requiring Follow-up:    Advanced Directives:   [ X] Full code  [ ] DNR  [ ] Hospice    Discharge Diagnoses:  leukocytosis   anemia  yanira   nephrolithiasis   dmt2             HPI:  Patient is a 69 year old male with PMHx of T2DM, HTN, HPL, Nephrolithiasis and Left Kidney/Urothelial Cancer c/b left sided mild-moderate hydronephrosis. Presents to [facility] sent by outpt nephrologist Dr. Sen for CT of Kidneys with IV contrast and IV hydration before and after. Patient states he was feeling in his normal state of health and was due for his first recurrent chemo treatment this AM at [facility] however it was canceled due to the fact that [facility] thought patient was still admitted. Patient then spoke to his Nephrologist who was unable to get patient to an ambulatory setting for CT with IV hydrations and recommended he come to the ED.      (07 Aug 2024 13:51)    Hospital Course:  # Leukocytosis likely 2/2 malignancy - Afebrile, Lactate 3.6, BCx NGTD x24H, UCx pending, s/p abx x1 in ED   - CXR with no focal consolidation, CT A/P with large infiltrative left renal mass consistent with history of urothelial cancer. Tumoral invasion of the left renal vein. Perinephric implants and retroperitoneal lymphadenopathy. A 3.2 cm implant in the splenic hilum. Right-sided liver lesions suspicious for metastatic disease. Left upper pole hydronephrosis.  Enlarged heterogeneous prostate  - As per IR and Urology -> cannot offer any intervention as hydro is mainly in superior portion of kidney and rest of the kidney is atrophic so not amenable to PCN tube  - Monitor off abx per ID -> if any fevers or worsening start Zosyn and monnitored temps/WBC  - ID following    # Anemia - Monitor CBC and transfused PRN    # YANIRA / Hyponatremia - Na 128 on admission likely 2/2 poor PO intake  - Salt tabs 1G TID started per Renal recs -> to be continued on DC  - No objection on DC from renal stand point 2/2 no intervention being offered and Renal followed throughout     # Nephrolithiasis and Left Kidney/Urothelial Cancer - Metastatic cancer with heavy tumor burden in the left kidney causing inability to place a retrograde ureteral stent  - Fu CT A/P-> nephrology concerned regarding possible fluid collection around the left kidney, r/o infectious collection.  -CT a/p w/ Large infiltrative left renal mass consistent with history of urothelial cancer. Tumoral invasion of the left renal vein. Perinephric implants and retroperitoneal lymphadenopathy. A 3.2 cm implant in the splenic hilum. Right-sided liver lesions suspicious for metastatic disease. Left upper pole hydronephrosis.  --urology and renal following  --per Urology and IR no intervention to be offered as hydro is mainly in superior portion of kidney and rest of the kidney is atrophic so not amenable to PCN tube.    Leukocytosis  --chronic, wbc 12-19 at baseline  --ID following, infectious w/up in progress, CXR w/o PNA, monitored off abx    - Follows with [facility] and [facility] followed in-pt  / cleared for d/c per [facility] onc and f/u with [facility] MD      # DM2 - HgbA1c 6.6% (from previous admission last week), monitored blood glucose levels and sliding scale    # HTN - trended BP, currently off BP meds    # GI ppx - PPI    # DVT ppx - IPC's      Important Medication Changes and Reason: NaCl 1g 3x day     Active or Pending Issues Requiring Follow-up: Cleared by Dr. Oliveira and [facility] onc and will f[place]u at [facility]    Advanced Directives:   [ X] Full code  [ ] DNR  [ ] Hospice    Discharge Diagnoses:  leukocytosis   anemia  yanira   nephrolithiasis   dmt2

## 2024-08-09 NOTE — DIETITIAN INITIAL EVALUATION ADULT - PERSON TAUGHT/METHOD
Wife with questions regarding Renal diet. RD provided brief renal diet education with written handouts. RD emphasized overall importance of protein-energy intake for maintaining weight and muscle mass in setting of cancer treatment. Pt/family made aware of menu ordering procedure in house w/ menu provided, encourage pt/family to order preferred foods to optimize PO intake while in house. Patient without specific food preferences for RD at this time. Patient made aware RD to remain available./verbal instruction/patient instructed

## 2024-08-09 NOTE — DIETITIAN INITIAL EVALUATION ADULT - PERTINENT MEDS FT
MEDICATIONS  (STANDING):  atorvastatin 20 milliGRAM(s) Oral daily  dextrose 5%. 1000 milliLiter(s) (50 mL/Hr) IV Continuous <Continuous>  dextrose 5%. 1000 milliLiter(s) (100 mL/Hr) IV Continuous <Continuous>  dextrose 50% Injectable 12.5 Gram(s) IV Push once  dextrose 50% Injectable 25 Gram(s) IV Push once  dextrose 50% Injectable 25 Gram(s) IV Push once  glucagon  Injectable 1 milliGRAM(s) IntraMuscular once  insulin lispro (ADMELOG) corrective regimen sliding scale   SubCutaneous three times a day before meals  magnesium oxide 400 milliGRAM(s) Oral daily  pantoprazole    Tablet 40 milliGRAM(s) Oral before breakfast  sodium chloride 1 Gram(s) Oral three times a day    MEDICATIONS  (PRN):  acetaminophen     Tablet .. 650 milliGRAM(s) Oral every 6 hours PRN Temp greater or equal to 38C (100.4F), Mild Pain (1 - 3)  aluminum hydroxide/magnesium hydroxide/simethicone Suspension 30 milliLiter(s) Oral every 4 hours PRN Dyspepsia  dextrose Oral Gel 15 Gram(s) Oral once PRN Blood Glucose LESS THAN 70 milliGRAM(s)/deciliter  melatonin 3 milliGRAM(s) Oral at bedtime PRN Insomnia  ondansetron Injectable 4 milliGRAM(s) IV Push every 8 hours PRN Nausea and/or Vomiting

## 2024-08-09 NOTE — DISCHARGE NOTE PROVIDER - NSDCMRMEDTOKEN_GEN_ALL_CORE_FT
atorvastatin 20 mg oral tablet: 1 tab(s) orally once a day- IN AM  magnesium oxide 400 mg oral capsule: 1 cap(s) orally once a day  metFORMIN 500 mg oral tablet: 1 tab(s) orally once a day  omeprazole 40 mg oral delayed release capsule: 1 cap(s) orally once a day  Trulicity Pen 1.5 mg/0.5 mL subcutaneous solution: 1.5 milligram(s) subcutaneously once a week   atorvastatin 20 mg oral tablet: 1 tab(s) orally once a day- IN AM  magnesium oxide 400 mg oral capsule: 1 cap(s) orally once a day  metFORMIN 500 mg oral tablet: 1 tab(s) orally once a day  omeprazole 40 mg oral delayed release capsule: 1 cap(s) orally once a day  sodium chloride 1 g oral tablet: 1 tab(s) orally 3 times a day as directed  Trulicity Pen 1.5 mg/0.5 mL subcutaneous solution: 1.5 milligram(s) subcutaneously once a week

## 2024-08-09 NOTE — DIETITIAN INITIAL EVALUATION ADULT - OTHER INFO
Patient reports weight loss within past few years, specifically within past year. Notes highest weight was ~310lb. Reports current UBW 210lb, notes recent weight at home 206.5lb on scale.  Dosing weight: 210.1lb (8/7).  IBW: 166lb, %IBW: 127% based on dosing weight.  [facility] 260lb (9/28/22), 245lb (12/3/18).  Weight appears decreasing, not meeting malnutrition criteria at this time based on HIE weight hx. RD to continue to monitor weight trends as available/able.     -Per Heme/Onc: "Metastatic urothelial cancer --Previously on gem carbo x 6 through 12/2023 -> avelumab since 2/2024 then held since 6/18/24 due to CRI, anemia, dizziness. PET 7/2024 shows PD at multiple sites. Planned for treatment with enfortumab vedotin."  -Per Nephrology: CKD3. Potassium WNL and no phosphorus to assess. Hyponatremia noted.  -Current insulin regimen in-house: Correctional sliding scale insulin. Finger Sticks WNL for DM.  -ordered for PO mag-ox and NaCl.

## 2024-08-09 NOTE — DIETITIAN INITIAL EVALUATION ADULT - ADD RECOMMEND
-Continue Consistent Carbohydrate diet. Consider liberalizing Renal restriction to low sodium as potassium WNL and phosphorus not being drawn.  -Monitor PO intake, diet, weight, labs, skin, GI symptoms, and BM regularity.  -RD remains available upon request and will follow up per protocol.   -Monitor for malnutrition. -Continue Consistent Carbohydrate diet. Consider liberalizing Renal restriction to low sodium as potassium WNL and phosphorus not being drawn.  -RD to allow double protein at meals.  -Monitor PO intake, diet, weight, labs, skin, GI symptoms, and BM regularity.  -RD remains available upon request and will follow up per protocol.   -Monitor for malnutrition.

## 2024-08-09 NOTE — DIETITIAN INITIAL EVALUATION ADULT - REASON INDICATOR FOR ASSESSMENT
RD assessment warranted for: Consult for MST score 2 or >. Chart reviewed, events noted.  Source: medical record, patient, wife at bedside.

## 2024-08-09 NOTE — DIETITIAN INITIAL EVALUATION ADULT - NSFNSPHYEXAMSKINFT_GEN_A_CORE
per flowsheets:   Pressure Injury 1: Left:, heel, Stage I  Pressure Injury 2: Right:, heel, Stage I  Pressure Injury 3: sacrum, coccyx, buttocks, Bilateral:, Stage I

## 2024-08-09 NOTE — PROGRESS NOTE ADULT - SUBJECTIVE AND OBJECTIVE BOX
[facility] INFECTIOUS DISEASES  BRADOFRD Nolasco Y. Patel, S. Shah, G. Richard  [phone]  ([phone] - weekdays after 5pm and weekends)    Name: JESSIE GREER  Age/Gender: 69y Male  MRN: [id number]    Interval History:  Patient seen and examined this morning.   No new complaints noted.  Notes reviewed  No concerning overnight events  Afebrile   Allergies: No Known Allergies      Objective:  Vitals:   T(F): 98 (08-08-24 @ 09:45), Max: 98.9 (08-07-24 @ 18:15)  HR: 97 (08-08-24 @ 09:45) (70 - 97)  BP: 116/75 (08-08-24 @ 09:45) (99/63 - 124/73)  RR: 18 (08-08-24 @ 09:45) (18 - 22)  SpO2: 97% (08-08-24 @ 09:45) (97% - 100%)  Physical Examination:  General: no acute distress, nontoxic appearing   HEENT: NC/AT, anicteric, EOMI  Respiratory: no acc muscle use, breathing comfortably  Cardiovascular: S1 and S2 present  Gastrointestinal: soft, nontender   Extremities: no edema, no cyanosis  Skin: no visible rash    Laboratory Studies:  CBC:                       6.5    15.97 )-----------( 364      ( 08 Aug 2024 07:18 )             22.7     WBC Trend:  15.97 08-08-24 @ 07:18  19.80 08-07-24 @ 09:54  16.62 08-05-24 @ 06:21  16.79 08-04-24 @ 06:50  15.12 08-03-24 @ 07:35  20.77 08-01-24 @ 20:14    CMP: 08-08    129<L>  |  97  |  33<H>  ----------------------------<  109<H>  4.3   |  19<L>  |  2.10<H>    Ca    9.3      08 Aug 2024 07:18    TPro  6.7  /  Alb  2.6<L>  /  TBili  0.4  /  DBili  x   /  AST  22  /  ALT  30  /  AlkPhos  148<H>  08-08    Creatinine: 2.10 mg/dL (08-08-24 @ 07:18)  Creatinine: 2.13 mg/dL (08-07-24 @ 09:54)  Creatinine: 2.15 mg/dL (08-05-24 @ 06:21)  Creatinine: 2.06 mg/dL (08-04-24 @ 06:50)  Creatinine: 2.09 mg/dL (08-03-24 @ 06:57)  Creatinine: 1.96 mg/dL (08-02-24 @ 03:40)  Creatinine: 2.02 mg/dL (08-01-24 @ 20:14)    LIVER FUNCTIONS - ( 08 Aug 2024 07:18 )  Alb: 2.6 g/dL / Pro: 6.7 g/dL / ALK PHOS: 148 U/L / ALT: 30 U/L / AST: 22 U/L / GGT: x           Urinalysis (08.07.24 @ 19:10)    Glucose Qualitative, Urine: Negative mg/dL   Blood, Urine: Negative   pH Urine: 5.5   Color: Yellow   Urine Appearance: Clear   Bilirubin: Negative   Ketone - Urine: Negative mg/dL   Specific Gravity: >1.030   Protein, Urine: 30 mg/dL   Urobilinogen: 0.2 mg/dL   Nitrite: Negative   Leukocyte Esterase Concentration: Negative    Microbiology: reviewed   Culture - Blood (collected 08-02-24 @ 00:20)  Source: .Blood Blood-Venous  Final Report (08-07-24 @ 06:00):    No growth at 5 days    Culture - Blood (collected 08-02-24 @ 00:05)  Source: .Blood Blood-Venous  Final Report (08-07-24 @ 06:00):    No growth at 5 days    Culture - Urine (collected 08-01-24 @ 21:15)  Source: Clean Catch Clean Catch (Midstream)  Final Report (08-02-24 @ 23:37):    No growth    SARS-CoV-2 Result: NotDete (07 Aug 2024 10:47)    Radiology: reviewed   < from: CT Abdomen and Pelvis w/ IV Cont (08.07.24 @ 13:55) >  IMPRESSION: Large infiltrative left renal mass consistent with history of   urothelial cancer.    Tumoral invasion of the left renal vein.    Perinephric implants and retroperitoneal lymphadenopathy.    A 3.2 cm implant in the splenic hilum.    Right-sided liver lesions suspicious for metastatic disease.    Left upper pole hydronephrosis.    Enlarged heterogeneous prostate.    < end of copied text >    Medications:  acetaminophen     Tablet .. 650 milliGRAM(s) Oral every 6 hours PRN  aluminum hydroxide/magnesium hydroxide/simethicone Suspension 30 milliLiter(s) Oral every 4 hours PRN  atorvastatin 20 milliGRAM(s) Oral daily  dextrose 5%. 1000 milliLiter(s) IV Continuous <Continuous>  dextrose 5%. 1000 milliLiter(s) IV Continuous <Continuous>  dextrose 50% Injectable 12.5 Gram(s) IV Push once  dextrose 50% Injectable 25 Gram(s) IV Push once  dextrose 50% Injectable 25 Gram(s) IV Push once  dextrose Oral Gel 15 Gram(s) Oral once PRN  glucagon  Injectable 1 milliGRAM(s) IntraMuscular once  insulin lispro (ADMELOG) corrective regimen sliding scale   SubCutaneous three times a day before meals  magnesium oxide 400 milliGRAM(s) Oral daily  melatonin 3 milliGRAM(s) Oral at bedtime PRN  ondansetron Injectable 4 milliGRAM(s) IV Push every 8 hours PRN  pantoprazole    Tablet 40 milliGRAM(s) Oral before breakfast    Current Antimicrobials:    Prior/Completed Antimicrobials:  piperacillin/tazobactam IVPB...  
Patient is a 69y old  Male who presents with a chief complaint of Leukocytosis    Patient seen and examined  Appears comfortable, no complaints offered  Family at bedside     MEDICATIONS  (STANDING):  atorvastatin 20 milliGRAM(s) Oral daily  dextrose 5%. 1000 milliLiter(s) (50 mL/Hr) IV Continuous <Continuous>  dextrose 5%. 1000 milliLiter(s) (100 mL/Hr) IV Continuous <Continuous>  dextrose 50% Injectable 12.5 Gram(s) IV Push once  dextrose 50% Injectable 25 Gram(s) IV Push once  dextrose 50% Injectable 25 Gram(s) IV Push once  glucagon  Injectable 1 milliGRAM(s) IntraMuscular once  insulin lispro (ADMELOG) corrective regimen sliding scale   SubCutaneous three times a day before meals  magnesium oxide 400 milliGRAM(s) Oral daily  pantoprazole    Tablet 40 milliGRAM(s) Oral before breakfast  sodium chloride 1 Gram(s) Oral three times a day    MEDICATIONS  (PRN):  acetaminophen     Tablet .. 650 milliGRAM(s) Oral every 6 hours PRN Temp greater or equal to 38C (100.4F), Mild Pain (1 - 3)  aluminum hydroxide/magnesium hydroxide/simethicone Suspension 30 milliLiter(s) Oral every 4 hours PRN Dyspepsia  dextrose Oral Gel 15 Gram(s) Oral once PRN Blood Glucose LESS THAN 70 milliGRAM(s)/deciliter  melatonin 3 milliGRAM(s) Oral at bedtime PRN Insomnia  ondansetron Injectable 4 milliGRAM(s) IV Push every 8 hours PRN Nausea and/or Vomiting        Vital Signs Last 24 Hrs  T(C): 36.9 (09 Aug 2024 12:29), Max: 37.9 (08 Aug 2024 19:40)  T(F): 98.5 (09 Aug 2024 12:29), Max: 100.3 (08 Aug 2024 19:40)  HR: 87 (09 Aug 2024 12:29) (82 - 97)  BP: 113/72 (09 Aug 2024 12:29) (107/68 - 142/77)  BP(mean): --  RR: 18 (09 Aug 2024 12:29) (16 - 18)  SpO2: 97% (09 Aug 2024 12:29) (96% - 99%)    Parameters below as of 09 Aug 2024 12:29  Patient On (Oxygen Delivery Method): room air        PE  Awake, alert  Anicteric, MMM  RRR  CTAB  Abd soft, NT, ND  No c/c                        7.7    16.54 )-----------( 406      ( 09 Aug 2024 07:20 )             25.3       08-09    132<L>  |  97  |  28<H>  ----------------------------<  99  4.3   |  20<L>  |  2.05<H>    Ca    9.8      09 Aug 2024 07:10    TPro  6.7  /  Alb  2.6<L>  /  TBili  0.4  /  DBili  x   /  AST  22  /  ALT  30  /  AlkPhos  148<H>  08-08      
SUBJECTIVE / OVERNIGHT EVENTS:    patient seen and examined  resting comfortably in bed  no c/o at this time  hgb better this AM - sp 1 unit PRBC    --------------------------------------------------------------------------------------------  LABS:                        7.7    16.54 )-----------( 406      ( 09 Aug 2024 07:20 )             25.3     08-09    132<L>  |  97  |  28<H>  ----------------------------<  99  4.3   |  20<L>  |  2.05<H>    Ca    9.8      09 Aug 2024 07:10    TPro  6.7  /  Alb  2.6<L>  /  TBili  0.4  /  DBili  x   /  AST  22  /  ALT  30  /  AlkPhos  148<H>  08-08    PT/INR - ( 07 Aug 2024 09:54 )   PT: 15.4 sec;   INR: 1.41 ratio         PTT - ( 07 Aug 2024 09:54 )  PTT:27.9 sec  CAPILLARY BLOOD GLUCOSE      POCT Blood Glucose.: 122 mg/dL (09 Aug 2024 08:15)  POCT Blood Glucose.: 145 mg/dL (08 Aug 2024 21:43)  POCT Blood Glucose.: 148 mg/dL (08 Aug 2024 18:13)  POCT Blood Glucose.: 149 mg/dL (08 Aug 2024 11:14)        Urinalysis Basic - ( 09 Aug 2024 07:10 )    Color: x / Appearance: x / SG: x / pH: x  Gluc: 99 mg/dL / Ketone: x  / Bili: x / Urobili: x   Blood: x / Protein: x / Nitrite: x   Leuk Esterase: x / RBC: x / WBC x   Sq Epi: x / Non Sq Epi: x / Bacteria: x        RADIOLOGY & ADDITIONAL TESTS:    Imaging Personally Reviewed:  [x] YES  [ ] NO    Consultant(s) Notes Reviewed:  [x] YES  [ ] NO    MEDICATIONS  (STANDING):  atorvastatin 20 milliGRAM(s) Oral daily  dextrose 5%. 1000 milliLiter(s) (50 mL/Hr) IV Continuous <Continuous>  dextrose 5%. 1000 milliLiter(s) (100 mL/Hr) IV Continuous <Continuous>  dextrose 50% Injectable 12.5 Gram(s) IV Push once  dextrose 50% Injectable 25 Gram(s) IV Push once  dextrose 50% Injectable 25 Gram(s) IV Push once  glucagon  Injectable 1 milliGRAM(s) IntraMuscular once  insulin lispro (ADMELOG) corrective regimen sliding scale   SubCutaneous three times a day before meals  magnesium oxide 400 milliGRAM(s) Oral daily  pantoprazole    Tablet 40 milliGRAM(s) Oral before breakfast    MEDICATIONS  (PRN):  acetaminophen     Tablet .. 650 milliGRAM(s) Oral every 6 hours PRN Temp greater or equal to 38C (100.4F), Mild Pain (1 - 3)  aluminum hydroxide/magnesium hydroxide/simethicone Suspension 30 milliLiter(s) Oral every 4 hours PRN Dyspepsia  dextrose Oral Gel 15 Gram(s) Oral once PRN Blood Glucose LESS THAN 70 milliGRAM(s)/deciliter  melatonin 3 milliGRAM(s) Oral at bedtime PRN Insomnia  ondansetron Injectable 4 milliGRAM(s) IV Push every 8 hours PRN Nausea and/or Vomiting      Care Discussed with Consultants/Other Providers [x] YES  [ ] NO    Vital Signs Last 24 Hrs  T(C): 37.1 (09 Aug 2024 05:15), Max: 37.9 (08 Aug 2024 19:40)  T(F): 98.7 (09 Aug 2024 05:15), Max: 100.3 (08 Aug 2024 19:40)  HR: 84 (09 Aug 2024 05:15) (82 - 97)  BP: 117/70 (09 Aug 2024 05:15) (107/68 - 142/77)  BP(mean): --  RR: 18 (09 Aug 2024 05:15) (16 - 18)  SpO2: 97% (09 Aug 2024 05:15) (96% - 99%)    Parameters below as of 09 Aug 2024 05:15  Patient On (Oxygen Delivery Method): room air      I&O's Summary    08 Aug 2024 07:01  -  09 Aug 2024 07:00  --------------------------------------------------------  IN: 0 mL / OUT: 450 mL / NET: -450 mL    PHYSICAL EXAM:  GENERAL: NAD, well-developed, comfortable  HEAD:  Atraumatic, Normocephalic  EYES: EOMI, PERRLA, conjunctiva and sclera clear  NECK: Supple, No JVD  CHEST/LUNG: Clear to auscultation bilaterally; No wheeze  HEART: Regular rate and rhythm; No murmurs, rubs, or gallops  ABDOMEN: Soft, Nontender, Nondistended; Bowel sounds present  NEURO: AAOx3, no focal weakness, 5/5 b/l extremity strength, b/l knee no arthritis, no effusion   EXTREMITIES:  2+ Peripheral Pulses, No clubbing, cyanosis, or edema  SKIN: No rashes or lesions      
[facility]  BRADFORD Nolasco Y. Patel, S. Shah, G. Casimir  [phone]  ([phone] - weekdays after 5pm and weekends)    Name: JESSIE GREER  Age/Gender: 69y Male  MRN: [id number]    Interval History:  Patient seen and examined this morning.   Resting comfortably, no new complaints.   Notes reviewed  No concerning overnight events  Afebrile   Allergies: No Known Allergies      Objective:  Vitals:   T(F): 98.7 (08-09-24 @ 05:15), Max: 100.3 (08-08-24 @ 19:40)  HR: 84 (08-09-24 @ 05:15) (82 - 97)  BP: 117/70 (08-09-24 @ 05:15) (107/68 - 142/77)  RR: 18 (08-09-24 @ 05:15) (16 - 18)  SpO2: 97% (08-09-24 @ 05:15) (96% - 99%)  Physical Examination:  General: no acute distress, nontoxic appearing   HEENT: NC/AT, anicteric, EOMI  Respiratory: no acc muscle use, breathing comfortably  Cardiovascular: S1 and S2 present  Gastrointestinal: soft, nontender   Extremities: no edema, no cyanosis  Skin: no visible rash    Laboratory Studies:  CBC:                       7.7    16.54 )-----------( 406      ( 09 Aug 2024 07:20 )             25.3     WBC Trend:  16.54 08-09-24 @ 07:20  18.14 08-08-24 @ 15:10  15.97 08-08-24 @ 07:18  19.80 08-07-24 @ 09:54  16.62 08-05-24 @ 06:21  16.79 08-04-24 @ 06:50  15.12 08-03-24 @ 07:35    CMP: 08-09    132<L>  |  97  |  28<H>  ----------------------------<  99  4.3   |  20<L>  |  2.05<H>    Ca    9.8      09 Aug 2024 07:10    TPro  6.7  /  Alb  2.6<L>  /  TBili  0.4  /  DBili  x   /  AST  22  /  ALT  30  /  AlkPhos  148<H>  08-08    Creatinine: 2.05 mg/dL (08-09-24 @ 07:10)  Creatinine: 2.10 mg/dL (08-08-24 @ 07:18)  Creatinine: 2.13 mg/dL (08-07-24 @ 09:54)  Creatinine: 2.15 mg/dL (08-05-24 @ 06:21)  Creatinine: 2.06 mg/dL (08-04-24 @ 06:50)  Creatinine: 2.09 mg/dL (08-03-24 @ 06:57)    LIVER FUNCTIONS - ( 08 Aug 2024 07:18 )  Alb: 2.6 g/dL / Pro: 6.7 g/dL / ALK PHOS: 148 U/L / ALT: 30 U/L / AST: 22 U/L / GGT: x           Microbiology: reviewed   Culture - Blood (collected 08-07-24 @ 10:30)  Source: .Blood Blood-Peripheral  Preliminary Report (08-08-24 @ 16:01):    No growth at 24 hours    Culture - Blood (collected 08-07-24 @ 09:25)  Source: .Blood Blood-Peripheral  Preliminary Report (08-08-24 @ 16:01):    No growth at 24 hours    Culture - Blood (collected 08-02-24 @ 00:20)  Source: .Blood Blood-Venous  Final Report (08-07-24 @ 06:00):    No growth at 5 days    Culture - Blood (collected 08-02-24 @ 00:05)  Source: .Blood Blood-Venous  Final Report (08-07-24 @ 06:00):    No growth at 5 days    Culture - Urine (collected 08-01-24 @ 21:15)  Source: Clean Catch Clean Catch (Midstream)  Final Report (08-02-24 @ 23:37):    No growth    SARS-CoV-2 Result: NotDete (07 Aug 2024 10:47)    Radiology: reviewed     Medications:  acetaminophen     Tablet .. 650 milliGRAM(s) Oral every 6 hours PRN  aluminum hydroxide/magnesium hydroxide/simethicone Suspension 30 milliLiter(s) Oral every 4 hours PRN  atorvastatin 20 milliGRAM(s) Oral daily  dextrose 5%. 1000 milliLiter(s) IV Continuous <Continuous>  dextrose 5%. 1000 milliLiter(s) IV Continuous <Continuous>  dextrose 50% Injectable 12.5 Gram(s) IV Push once  dextrose 50% Injectable 25 Gram(s) IV Push once  dextrose 50% Injectable 25 Gram(s) IV Push once  dextrose Oral Gel 15 Gram(s) Oral once PRN  glucagon  Injectable 1 milliGRAM(s) IntraMuscular once  insulin lispro (ADMELOG) corrective regimen sliding scale   SubCutaneous three times a day before meals  magnesium oxide 400 milliGRAM(s) Oral daily  melatonin 3 milliGRAM(s) Oral at bedtime PRN  ondansetron Injectable 4 milliGRAM(s) IV Push every 8 hours PRN  pantoprazole    Tablet 40 milliGRAM(s) Oral before breakfast  sodium chloride 1 Gram(s) Oral three times a day    Current Antimicrobials:    Prior/Completed Antimicrobials:  piperacillin/tazobactam IVPB...  
SUBJECTIVE / OVERNIGHT EVENTS:    patient seen and examined in ED  resting comfortably on stretcher  no c/o at this time  no events noted overnight  hgb low this AM - awaiting repeat CBC - ORDERED  --------------------------------------------------------------------------------------------  LABS:                        6.5    15.97 )-----------( 364      ( 08 Aug 2024 07:18 )             22.7     08-08    129<L>  |  97  |  33<H>  ----------------------------<  109<H>  4.3   |  19<L>  |  2.10<H>    Ca    9.3      08 Aug 2024 07:18    TPro  6.7  /  Alb  2.6<L>  /  TBili  0.4  /  DBili  x   /  AST  22  /  ALT  30  /  AlkPhos  148<H>  08-08    PT/INR - ( 07 Aug 2024 09:54 )   PT: 15.4 sec;   INR: 1.41 ratio         PTT - ( 07 Aug 2024 09:54 )  PTT:27.9 sec  CAPILLARY BLOOD GLUCOSE      POCT Blood Glucose.: 134 mg/dL (08 Aug 2024 07:35)  POCT Blood Glucose.: 168 mg/dL (07 Aug 2024 18:10)        Urinalysis Basic - ( 08 Aug 2024 07:18 )    Color: x / Appearance: x / SG: x / pH: x  Gluc: 109 mg/dL / Ketone: x  / Bili: x / Urobili: x   Blood: x / Protein: x / Nitrite: x   Leuk Esterase: x / RBC: x / WBC x   Sq Epi: x / Non Sq Epi: x / Bacteria: x        RADIOLOGY & ADDITIONAL TESTS:    Imaging Personally Reviewed:  [x] YES  [ ] NO    Consultant(s) Notes Reviewed:  [x] YES  [ ] NO    MEDICATIONS  (STANDING):  atorvastatin 20 milliGRAM(s) Oral daily  dextrose 5%. 1000 milliLiter(s) (50 mL/Hr) IV Continuous <Continuous>  dextrose 5%. 1000 milliLiter(s) (100 mL/Hr) IV Continuous <Continuous>  dextrose 50% Injectable 12.5 Gram(s) IV Push once  dextrose 50% Injectable 25 Gram(s) IV Push once  dextrose 50% Injectable 25 Gram(s) IV Push once  glucagon  Injectable 1 milliGRAM(s) IntraMuscular once  insulin lispro (ADMELOG) corrective regimen sliding scale   SubCutaneous three times a day before meals  magnesium oxide 400 milliGRAM(s) Oral daily  pantoprazole    Tablet 40 milliGRAM(s) Oral before breakfast    MEDICATIONS  (PRN):  acetaminophen     Tablet .. 650 milliGRAM(s) Oral every 6 hours PRN Temp greater or equal to 38C (100.4F), Mild Pain (1 - 3)  aluminum hydroxide/magnesium hydroxide/simethicone Suspension 30 milliLiter(s) Oral every 4 hours PRN Dyspepsia  dextrose Oral Gel 15 Gram(s) Oral once PRN Blood Glucose LESS THAN 70 milliGRAM(s)/deciliter  melatonin 3 milliGRAM(s) Oral at bedtime PRN Insomnia  ondansetron Injectable 4 milliGRAM(s) IV Push every 8 hours PRN Nausea and/or Vomiting      Care Discussed with Consultants/Other Providers [x] YES  [ ] NO    Vital Signs Last 24 Hrs  T(C): 36.1 (08 Aug 2024 05:13), Max: 37.2 (07 Aug 2024 18:15)  T(F): 97 (08 Aug 2024 05:13), Max: 98.9 (07 Aug 2024 18:15)  HR: 70 (08 Aug 2024 05:13) (70 - 95)  BP: 99/63 (08 Aug 2024 05:13) (99/63 - 124/73)  BP(mean): 80 (07 Aug 2024 16:24) (80 - 80)  RR: 20 (08 Aug 2024 05:13) (18 - 22)  SpO2: 100% (08 Aug 2024 05:13) (98% - 100%)    Parameters below as of 08 Aug 2024 05:13  Patient On (Oxygen Delivery Method): room air      I&O's Summary    PHYSICAL EXAM:  GENERAL: NAD, well-developed, comfortable  HEAD:  Atraumatic, Normocephalic  EYES: EOMI, PERRLA, conjunctiva and sclera clear  NECK: Supple, No JVD  CHEST/LUNG: Clear to auscultation bilaterally; No wheeze  HEART: Regular rate and rhythm; No murmurs, rubs, or gallops  ABDOMEN: Soft, Nontender, Nondistended; Bowel sounds present  NEURO: AAOx3, no focal weakness, 5/5 b/l extremity strength, b/l knee no arthritis, no effusion   EXTREMITIES:  2+ Peripheral Pulses, No clubbing, cyanosis, or edema  SKIN: No rashes or lesions        
[facility] Division of Kidney Diseases & Hypertension  FOLLOW UP NOTE  [phone]--------------------------------------------------------------------------------    Reason for consult: CKD      24 hour events/subjective: Patient seen and examined today. No acute complaints or overnight events.      PAST HISTORY  --------------------------------------------------------------------------------  No significant changes to PMH, PSH, FHx, SHx, unless otherwise noted    ALLERGIES & MEDICATIONS  --------------------------------------------------------------------------------  Allergies    No Known Allergies    Standing Inpatient Medications  atorvastatin 20 milliGRAM(s) Oral daily  dextrose 5%. 1000 milliLiter(s) IV Continuous <Continuous>  dextrose 5%. 1000 milliLiter(s) IV Continuous <Continuous>  dextrose 50% Injectable 12.5 Gram(s) IV Push once  dextrose 50% Injectable 25 Gram(s) IV Push once  dextrose 50% Injectable 25 Gram(s) IV Push once  glucagon  Injectable 1 milliGRAM(s) IntraMuscular once  insulin lispro (ADMELOG) corrective regimen sliding scale   SubCutaneous three times a day before meals  magnesium oxide 400 milliGRAM(s) Oral daily  pantoprazole    Tablet 40 milliGRAM(s) Oral before breakfast    PRN Inpatient Medications  acetaminophen     Tablet .. 650 milliGRAM(s) Oral every 6 hours PRN  aluminum hydroxide/magnesium hydroxide/simethicone Suspension 30 milliLiter(s) Oral every 4 hours PRN  dextrose Oral Gel 15 Gram(s) Oral once PRN  melatonin 3 milliGRAM(s) Oral at bedtime PRN  ondansetron Injectable 4 milliGRAM(s) IV Push every 8 hours PRN      REVIEW OF SYSTEMS  --------------------------------------------------------------------------------  Constitutional: No fevers   Respiratory: No SOB  Cardiovascular: No chest pain  Gastrointestinal: No abdominal pain, diarrhea, nausea, vomiting  Genitourinary: No dysuria, hematuria, urgency    All other review of systems noted above.    VITALS/PHYSICAL EXAM  --------------------------------------------------------------------------------  T(C): 37.8 (08-08-24 @ 13:15), Max: 37.8 (08-08-24 @ 13:15)  HR: 97 (08-08-24 @ 13:15) (70 - 97)  BP: 107/68 (08-08-24 @ 13:15) (99/63 - 116/75)  RR: 18 (08-08-24 @ 13:15) (18 - 20)  SpO2: 96% (08-08-24 @ 13:15) (96% - 100%)  Wt(kg): --  Height (cm): 177.8 (08-07-24 @ 08:21)  Weight (kg): 95.3 (08-07-24 @ 08:21)  BMI (kg/m2): 30.1 (08-07-24 @ 08:21)  BSA (m2): 2.13 (08-07-24 @ 08:21)      Physical Exam:                       Gen: NAD                       Pulm: CTA B/L                       CV: +S1S2                       Abd: +BS, soft, nondistended/nontender                       Extremities: no bilateral LE edema                       Neuro: non-focal    LABS/STUDIES  --------------------------------------------------------------------------------              6.5    15.97 >-----------<  364      [08-08-24 @ 07:18]              22.7     129  |  97  |  33  ----------------------------<  109      [08-08-24 @ 07:18]  4.3   |  19  |  2.10        Ca     9.3     [08-08-24 @ 07:18]    TPro  6.7  /  Alb  2.6  /  TBili  0.4  /  DBili  x   /  AST  22  /  ALT  30  /  AlkPhos  148  [08-08-24 @ 07:18]    PT/INR: PT 15.4 , INR 1.41       [08-07-24 @ 09:54]  PTT: 27.9       [08-07-24 @ 09:54]      Creatinine Trend:  SCr 2.10 [08-08 @ 07:18]  SCr 2.13 [08-07 @ 09:54]  SCr 2.15 [08-05 @ 06:21]  SCr 2.06 [08-04 @ 06:50]  SCr 2.09 [08-03 @ 06:57]    Urine Sodium 70      [08-07-24 @ 19:10]  Urine Osmolality 513      [08-07-24 @ 19:10]    Iron 13, TIBC 141, %sat 9      [08-03-24 @ 06:57]  Ferritin 1457      [08-03-24 @ 07:05]

## 2024-08-09 NOTE — DISCHARGE NOTE PROVIDER - NSDCCPCAREPLAN_GEN_ALL_CORE_FT
PRINCIPAL DISCHARGE DIAGNOSIS  Diagnosis: Leukocytosis  Assessment and Plan of Treatment: Leukocytosis likely 2/2 malignancy - Afebrile, Lactate 3.6, BCx NGTD x24H, UCx pending, s/p abx x1 in ED   - CXR with no focal consolidation, CT A/P with large infiltrative left renal mass consistent with history of urothelial cancer. Tumoral invasion of the left renal vein. Perinephric implants and retroperitoneal lymphadenopathy. A 3.2 cm implant in the splenic hilum. Right-sided liver lesions suspicious for metastatic disease. Left upper pole hydronephrosis.  Enlarged heterogeneous prostate  - As per IR and Urology -> cannot offer any intervention as hydro is mainly in superior portion of kidney and rest of the kidney is atrophic so not amenable to PCN tube  - Monitor off abx per ID -> if any fevers or worsening start Zosyn and monnitored temps/WBC  - ID followed      SECONDARY DISCHARGE DIAGNOSES  Diagnosis: Anemia  Assessment and Plan of Treatment: Anemia - Monitor CBC and transfused PRN    Diagnosis: YANIRA (acute kidney injury)  Assessment and Plan of Treatment: YANIRA / Hyponatremia - Na 128 on admission likely 2/2 poor PO intake  - Salt tabs 1G TID started per Renal recs -> to be continued on DC  - No objection on DC from renal stand point 2/2 no intervention being offered and Renal followed throughout    Diagnosis: Nephrolithiasis  Assessment and Plan of Treatment: Nephrolithiasis and Left Kidney/Urothelial Cancer - Metastatic cancer with heavy tumor burden in the left kidney causing inability to place a retrograde ureteral stent  - Fu CT A/P-> nephrology concerned regarding possible fluid collection around the left kidney, r/o infectious collection   - Follows with [facility] and Heme followed in-pt    Diagnosis: DM type 2, goal HbA1c < 9%  Assessment and Plan of Treatment: DM2 - HgbA1c 6.6% (from previous admission last week), monitored blood glucose levels and sliding scale      Diagnosis: High serum lactate  Assessment and Plan of Treatment:     Diagnosis: Hyponatremia  Assessment and Plan of Treatment:     Diagnosis: HTN (hypertension)  Assessment and Plan of Treatment: HTN - trended BP, currently off BP meds

## 2024-08-09 NOTE — DIETITIAN INITIAL EVALUATION ADULT - NSFNSADHERENCEPTAFT_GEN_A_CORE
Patient with hx DM, A1C 6.6% from 8/3/24 indicates good glycemic control PTA. Patient reports blood glucose levels are checked 1x/day fasting in the morning and is usually ~90-100mg/dL. Reports taking metformin and Trulicity PTA.

## 2024-08-12 ENCOUNTER — APPOINTMENT (OUTPATIENT)
Dept: HEMATOLOGY ONCOLOGY | Facility: CLINIC | Age: 69
End: 2024-08-12

## 2024-08-12 LAB
CULTURE RESULTS: SIGNIFICANT CHANGE UP
CULTURE RESULTS: SIGNIFICANT CHANGE UP
SPECIMEN SOURCE: SIGNIFICANT CHANGE UP
SPECIMEN SOURCE: SIGNIFICANT CHANGE UP

## 2024-08-14 ENCOUNTER — NON-APPOINTMENT (OUTPATIENT)
Age: 69
End: 2024-08-14

## 2024-09-04 ENCOUNTER — APPOINTMENT (OUTPATIENT)
Dept: NEPHROLOGY | Facility: CLINIC | Age: 69
End: 2024-09-04
Payer: COMMERCIAL

## 2024-09-04 VITALS
TEMPERATURE: 97.5 F | SYSTOLIC BLOOD PRESSURE: 116 MMHG | WEIGHT: 200.38 LBS | OXYGEN SATURATION: 96 % | DIASTOLIC BLOOD PRESSURE: 76 MMHG | HEART RATE: 109 BPM | HEIGHT: 70 IN | BODY MASS INDEX: 28.69 KG/M2

## 2024-09-04 DIAGNOSIS — N17.9 ACUTE KIDNEY FAILURE, UNSPECIFIED: ICD-10-CM

## 2024-09-04 PROCEDURE — 99214 OFFICE O/P EST MOD 30 MIN: CPT | Mod: GC

## 2024-09-04 NOTE — ASSESSMENT
[FreeTextEntry1] : Mr. Vila presents to nephrology clinic with his wife for f/u of rising creatinine. He was last seen on 8/1/2024.  Mr. Vila is a 69-year-old male with pmh of T2DM, HTN, HPL, left carotid artery stenosis and left kidney/urothelial cancer complicated by left hydronephrosis not amenable for intervention, returns to clinic for follow up of YANIRA and hyperkalemia and recent hospitalization:  1. YANIRA: Most recent Cr 1.6, down from peak 2.4 on 7/2/2024. Etiology is likely left hydronephrosis and concurrent use of RAASi and NSAIDs, and now appears to be improving (Cr 1.6 clast week) off RAASi/NSAIDs and possibly improvement in left renal mass/hydronephrosis on new chemotherapy (Enfortumab)?  We do not have historical Cr values, but a quick glance at Cr values on his phone show near normal Cr in 2023 before cancer diagnosis and chemotherapy. - Advised to continue to avoid NSAIDs - Continue to monitor renal function periodically - Continue with chemo per oncology - Advised to seek medical care if develops f/c/n/v  2. Hyperkalemia: K 6.4 during last visit, admitted, now resolved with improved renal function and off RAASi and NSAIDs. Most recent K 4.3. - Continue to avoid NSAIDs and RAASi for now   3. Hyponatremia: Mild hyponatremia, Na 133 most recently. multifactorial etiology - increased water intake and impaired water excretion during YANIRA, started on NaCl tabs during hospitalization. - Fluid restriction to ~ 36 oz water daily for now - Continue with NaCl tabs   4. Mild tachycardia: HR 100s with acceptable BP, no acute symptoms. Likely due to anemia.  Patient's wife is concerned.  - Advised to contact his cardiologist   F/U in 6 months

## 2024-09-04 NOTE — HISTORY OF PRESENT ILLNESS
[FreeTextEntry1] : Mr. Vila presents to nephrology clinic with his wife for initial evaluation of rising creatinine, referred by Dr. Herring from [facility].  He is a 69-year-old male with pmh of T2DM, HTN, HPL, nephrolithiasis, left carotid artery stenosis and left kidney/urothelial cancer. He initially presented in 6/2023 with hematuria and kidney stones. Imaging showed a mass in the left kidney lower pole/collecting system region, complicated by mild-moderate hydronephrosis for which he had a stent placed. 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 Received 6 cycles of chemo with carboplatin/gemcitabine 8/2023-12/2023 with good response and has been maintained on avelumab. Ureteral stent was exchanged successfully in 2/2024 but exchange last week at [facility] was unsuccessful. Per patient's wife, the stent was removed but unable to be replaced due to obstruction of the renal pelvis due to "tumor". He was given cephalexin x 7 days, and nephrostomy tube placement was discussed.  Lab results scanned into the patient's chart from [facility] from 7/2/2024 show Cr 2.4 (reportedly increased from 2.1 in recent past - prompting the referral) and K 4.9. A quick review of historical labs on the patient's phone shows near normal Cr in 2023 before cancer diagnosis and starting chemotherapy. Other lab data include WBC 18k, hgb 7.9, platelets 557, albumin 3.1.  Patient's wife also notified me that labs drawn by endocrinology (Dr. Webb) in the past two days showed K 6.4.  Patient was sent to the ER after last visit for evaluation and management of hyperkalemia and left hydronephrosis.   9/4/2024: Since last visit: - he was admitted for hyperK and hydro - seen by IR and urology - left hydro deemed not amenable to stenting, left kidney thought to be atrophic  - infectious process ruled out with cultures, briefly on empiric abx - discharged on sodium chloride tabs for mild hyponatremia  - he has reduced his water intake to 36 oz daily  - he has now started new chemo - Enfortumab started 8/15, has had 2 doses, q 2 weeks  - he reports improved appetite, denies f/c/, dyspnea, chest pain, orthopnea, edema, abd pain, n/v/d, hematuria, dysuria, rash  - continues to have some mild generalized weakness but improved   Most recent labs - 8/28: K 4.3 Na 133 Cr 1.6, eGFR 46 Hgb 8.7 WBC 14

## 2024-09-24 ENCOUNTER — INPATIENT (INPATIENT)
Facility: HOSPITAL | Age: 69
LOS: 3 days | Discharge: HOME HEALTH | DRG: 684 | End: 2024-09-28
Attending: INTERNAL MEDICINE | Admitting: STUDENT IN AN ORGANIZED HEALTH CARE EDUCATION/TRAINING PROGRAM
Payer: MEDICARE

## 2024-09-24 VITALS
HEIGHT: 70 IN | OXYGEN SATURATION: 100 % | WEIGHT: 210.1 LBS | SYSTOLIC BLOOD PRESSURE: 108 MMHG | DIASTOLIC BLOOD PRESSURE: 70 MMHG | HEART RATE: 85 BPM | TEMPERATURE: 99 F | RESPIRATION RATE: 18 BRPM

## 2024-09-24 DIAGNOSIS — Z98.890 OTHER SPECIFIED POSTPROCEDURAL STATES: Chronic | ICD-10-CM

## 2024-09-24 DIAGNOSIS — N17.9 ACUTE KIDNEY FAILURE, UNSPECIFIED: ICD-10-CM

## 2024-09-24 LAB
ALBUMIN SERPL ELPH-MCNC: 2.7 G/DL — LOW (ref 3.3–5)
ALP SERPL-CCNC: 186 U/L — HIGH (ref 40–120)
ALT FLD-CCNC: 27 U/L — SIGNIFICANT CHANGE UP (ref 10–45)
ANION GAP SERPL CALC-SCNC: 21 MMOL/L — HIGH (ref 5–17)
ANISOCYTOSIS BLD QL: SIGNIFICANT CHANGE UP
APPEARANCE UR: ABNORMAL
AST SERPL-CCNC: 13 U/L — SIGNIFICANT CHANGE UP (ref 10–40)
BACTERIA # UR AUTO: NEGATIVE /HPF — SIGNIFICANT CHANGE UP
BASOPHILS # BLD AUTO: 0 K/UL — SIGNIFICANT CHANGE UP (ref 0–0.2)
BASOPHILS NFR BLD AUTO: 0 % — SIGNIFICANT CHANGE UP (ref 0–2)
BILIRUB SERPL-MCNC: 0.5 MG/DL — SIGNIFICANT CHANGE UP (ref 0.2–1.2)
BILIRUB UR-MCNC: NEGATIVE — SIGNIFICANT CHANGE UP
BUN SERPL-MCNC: 95 MG/DL — HIGH (ref 7–23)
CALCIUM SERPL-MCNC: 9.9 MG/DL — SIGNIFICANT CHANGE UP (ref 8.4–10.5)
CAST: 2 /LPF — SIGNIFICANT CHANGE UP (ref 0–4)
CHLORIDE SERPL-SCNC: 92 MMOL/L — LOW (ref 96–108)
CO2 SERPL-SCNC: 17 MMOL/L — LOW (ref 22–31)
COLOR SPEC: YELLOW — SIGNIFICANT CHANGE UP
CREAT SERPL-MCNC: 7.25 MG/DL — HIGH (ref 0.5–1.3)
DACRYOCYTES BLD QL SMEAR: SLIGHT — SIGNIFICANT CHANGE UP
DIFF PNL FLD: ABNORMAL
EGFR: 8 ML/MIN/1.73M2 — LOW
EGFR: 8 ML/MIN/1.73M2 — LOW
ELLIPTOCYTES BLD QL SMEAR: SLIGHT — SIGNIFICANT CHANGE UP
EOSINOPHIL # BLD AUTO: 0 K/UL — SIGNIFICANT CHANGE UP (ref 0–0.5)
EOSINOPHIL NFR BLD AUTO: 0 % — SIGNIFICANT CHANGE UP (ref 0–6)
GAS PNL BLDV: SIGNIFICANT CHANGE UP
GLUCOSE SERPL-MCNC: 132 MG/DL — HIGH (ref 70–99)
GLUCOSE UR QL: NEGATIVE MG/DL — SIGNIFICANT CHANGE UP
HCT VFR BLD CALC: 23.3 % — LOW (ref 39–50)
HGB BLD-MCNC: 7.1 G/DL — LOW (ref 13–17)
HYALINE CASTS # UR AUTO: PRESENT
HYPOCHROMIA BLD QL: SLIGHT — SIGNIFICANT CHANGE UP
KETONES UR-MCNC: NEGATIVE MG/DL — SIGNIFICANT CHANGE UP
LEUKOCYTE ESTERASE UR-ACNC: ABNORMAL
LIDOCAIN IGE QN: 12 U/L — SIGNIFICANT CHANGE UP (ref 7–60)
LYMPHOCYTES # BLD AUTO: 1.16 K/UL — SIGNIFICANT CHANGE UP (ref 1–3.3)
LYMPHOCYTES # BLD AUTO: 7.1 % — LOW (ref 13–44)
MACROCYTES BLD QL: SLIGHT — SIGNIFICANT CHANGE UP
MAGNESIUM SERPL-MCNC: 2 MG/DL — SIGNIFICANT CHANGE UP (ref 1.6–2.6)
MANUAL SMEAR VERIFICATION: SIGNIFICANT CHANGE UP
MCHC RBC-ENTMCNC: 23.4 PG — LOW (ref 27–34)
MCHC RBC-ENTMCNC: 30.5 GM/DL — LOW (ref 32–36)
MCV RBC AUTO: 76.6 FL — LOW (ref 80–100)
METAMYELOCYTES # FLD: 1.8 % — HIGH (ref 0–0)
METAMYELOCYTES NFR BLD: 1.8 % — HIGH (ref 0–0)
MICROCYTES BLD QL: SLIGHT — SIGNIFICANT CHANGE UP
MONOCYTES # BLD AUTO: 2.77 K/UL — HIGH (ref 0–0.9)
MONOCYTES NFR BLD AUTO: 17 % — HIGH (ref 2–14)
NEUTROPHILS # BLD AUTO: 12.08 K/UL — HIGH (ref 1.8–7.4)
NEUTROPHILS NFR BLD AUTO: 74.1 % — SIGNIFICANT CHANGE UP (ref 43–77)
NITRITE UR-MCNC: NEGATIVE — SIGNIFICANT CHANGE UP
OVALOCYTES BLD QL SMEAR: SLIGHT — SIGNIFICANT CHANGE UP
PH UR: 5.5 — SIGNIFICANT CHANGE UP (ref 5–8)
PHOSPHATE SERPL-MCNC: 5.7 MG/DL — HIGH (ref 2.5–4.5)
PLAT MORPH BLD: ABNORMAL
PLATELET # BLD AUTO: 610 K/UL — HIGH (ref 150–400)
POLYCHROMASIA BLD QL SMEAR: SLIGHT — SIGNIFICANT CHANGE UP
POTASSIUM SERPL-MCNC: 4.5 MMOL/L — SIGNIFICANT CHANGE UP (ref 3.5–5.3)
POTASSIUM SERPL-SCNC: 4.5 MMOL/L — SIGNIFICANT CHANGE UP (ref 3.5–5.3)
PROT SERPL-MCNC: 7.2 G/DL — SIGNIFICANT CHANGE UP (ref 6–8.3)
PROT UR-MCNC: 30 MG/DL
RBC # BLD: 3.04 M/UL — LOW (ref 4.2–5.8)
RBC # FLD: 20.1 % — HIGH (ref 10.3–14.5)
RBC BLD AUTO: ABNORMAL
RBC CASTS # UR COMP ASSIST: 82 /HPF — HIGH (ref 0–4)
REVIEW: SIGNIFICANT CHANGE UP
SCHISTOCYTES BLD QL AUTO: SLIGHT — SIGNIFICANT CHANGE UP
SODIUM SERPL-SCNC: 130 MMOL/L — LOW (ref 135–145)
SP GR SPEC: 1.01 — SIGNIFICANT CHANGE UP (ref 1–1.03)
SQUAMOUS # UR AUTO: 1 /HPF — SIGNIFICANT CHANGE UP (ref 0–5)
UROBILINOGEN FLD QL: 1 MG/DL — SIGNIFICANT CHANGE UP (ref 0.2–1)
WBC # BLD: 16.3 K/UL — HIGH (ref 3.8–10.5)
WBC # FLD AUTO: 16.3 K/UL — HIGH (ref 3.8–10.5)
WBC CLUMPS # UR AUTO: PRESENT
WBC UR QL: 2 /HPF — SIGNIFICANT CHANGE UP (ref 0–5)

## 2024-09-24 PROCEDURE — 99285 EMERGENCY DEPT VISIT HI MDM: CPT

## 2024-09-24 PROCEDURE — 74176 CT ABD & PELVIS W/O CONTRAST: CPT | Mod: 26,MC

## 2024-09-24 PROCEDURE — 99497 ADVNCD CARE PLAN 30 MIN: CPT | Mod: 25

## 2024-09-24 PROCEDURE — 99223 1ST HOSP IP/OBS HIGH 75: CPT

## 2024-09-24 RX ADMIN — Medication 100 MILLILITER(S): at 20:40

## 2024-09-25 DIAGNOSIS — Z79.01 LONG TERM (CURRENT) USE OF ANTICOAGULANTS: ICD-10-CM

## 2024-09-25 DIAGNOSIS — D64.9 ANEMIA, UNSPECIFIED: ICD-10-CM

## 2024-09-25 DIAGNOSIS — N17.9 ACUTE KIDNEY FAILURE, UNSPECIFIED: ICD-10-CM

## 2024-09-25 DIAGNOSIS — C79.10 SECONDARY MALIGNANT NEOPLASM OF UNSPECIFIED URINARY ORGANS: Chronic | ICD-10-CM

## 2024-09-25 DIAGNOSIS — E87.1 HYPO-OSMOLALITY AND HYPONATREMIA: ICD-10-CM

## 2024-09-25 DIAGNOSIS — C79.10 SECONDARY MALIGNANT NEOPLASM OF UNSPECIFIED URINARY ORGANS: ICD-10-CM

## 2024-09-25 DIAGNOSIS — D72.829 ELEVATED WHITE BLOOD CELL COUNT, UNSPECIFIED: ICD-10-CM

## 2024-09-25 DIAGNOSIS — N19 UNSPECIFIED KIDNEY FAILURE: ICD-10-CM

## 2024-09-25 DIAGNOSIS — E11.9 TYPE 2 DIABETES MELLITUS WITHOUT COMPLICATIONS: ICD-10-CM

## 2024-09-25 LAB
ANION GAP SERPL CALC-SCNC: 17 MMOL/L — SIGNIFICANT CHANGE UP (ref 5–17)
BASOPHILS # BLD AUTO: 0.09 K/UL — SIGNIFICANT CHANGE UP (ref 0–0.2)
BASOPHILS NFR BLD AUTO: 0.6 % — SIGNIFICANT CHANGE UP (ref 0–2)
BUN SERPL-MCNC: 91 MG/DL — HIGH (ref 7–23)
CALCIUM SERPL-MCNC: 9.4 MG/DL — SIGNIFICANT CHANGE UP (ref 8.4–10.5)
CHLORIDE SERPL-SCNC: 99 MMOL/L — SIGNIFICANT CHANGE UP (ref 96–108)
CO2 SERPL-SCNC: 17 MMOL/L — LOW (ref 22–31)
CREAT SERPL-MCNC: 5.87 MG/DL — HIGH (ref 0.5–1.3)
CULTURE RESULTS: NO GROWTH — SIGNIFICANT CHANGE UP
EGFR: 10 ML/MIN/1.73M2 — LOW
EGFR: 10 ML/MIN/1.73M2 — LOW
EOSINOPHIL # BLD AUTO: 0.02 K/UL — SIGNIFICANT CHANGE UP (ref 0–0.5)
EOSINOPHIL NFR BLD AUTO: 0.1 % — SIGNIFICANT CHANGE UP (ref 0–6)
GLUCOSE BLDC GLUCOMTR-MCNC: 112 MG/DL — HIGH (ref 70–99)
GLUCOSE BLDC GLUCOMTR-MCNC: 116 MG/DL — HIGH (ref 70–99)
GLUCOSE BLDC GLUCOMTR-MCNC: 118 MG/DL — HIGH (ref 70–99)
GLUCOSE BLDC GLUCOMTR-MCNC: 136 MG/DL — HIGH (ref 70–99)
GLUCOSE BLDC GLUCOMTR-MCNC: 151 MG/DL — HIGH (ref 70–99)
GLUCOSE SERPL-MCNC: 105 MG/DL — HIGH (ref 70–99)
HCT VFR BLD CALC: 22.7 % — LOW (ref 39–50)
HCT VFR BLD CALC: 28.4 % — LOW (ref 39–50)
HGB BLD-MCNC: 6.8 G/DL — CRITICAL LOW (ref 13–17)
HGB BLD-MCNC: 8.5 G/DL — LOW (ref 13–17)
IMM GRANULOCYTES NFR BLD AUTO: 2.7 % — HIGH (ref 0–0.9)
LYMPHOCYTES # BLD AUTO: 1.98 K/UL — SIGNIFICANT CHANGE UP (ref 1–3.3)
LYMPHOCYTES # BLD AUTO: 14 % — SIGNIFICANT CHANGE UP (ref 13–44)
MCHC RBC-ENTMCNC: 23.3 PG — LOW (ref 27–34)
MCHC RBC-ENTMCNC: 24.1 PG — LOW (ref 27–34)
MCHC RBC-ENTMCNC: 29.9 GM/DL — LOW (ref 32–36)
MCHC RBC-ENTMCNC: 30 GM/DL — LOW (ref 32–36)
MCV RBC AUTO: 77.7 FL — LOW (ref 80–100)
MCV RBC AUTO: 80.7 FL — SIGNIFICANT CHANGE UP (ref 80–100)
MONOCYTES # BLD AUTO: 1.86 K/UL — HIGH (ref 0–0.9)
MONOCYTES NFR BLD AUTO: 13.1 % — SIGNIFICANT CHANGE UP (ref 2–14)
NEUTROPHILS # BLD AUTO: 9.86 K/UL — HIGH (ref 1.8–7.4)
NEUTROPHILS NFR BLD AUTO: 69.5 % — SIGNIFICANT CHANGE UP (ref 43–77)
NRBC # BLD: 0 /100 WBCS — SIGNIFICANT CHANGE UP (ref 0–0)
NRBC # BLD: 0 /100 WBCS — SIGNIFICANT CHANGE UP (ref 0–0)
NRBC BLD-RTO: 0 /100 WBCS — SIGNIFICANT CHANGE UP (ref 0–0)
NRBC BLD-RTO: 0 /100 WBCS — SIGNIFICANT CHANGE UP (ref 0–0)
PLATELET # BLD AUTO: 577 K/UL — HIGH (ref 150–400)
PLATELET # BLD AUTO: 585 K/UL — HIGH (ref 150–400)
POTASSIUM SERPL-MCNC: 4.4 MMOL/L — SIGNIFICANT CHANGE UP (ref 3.5–5.3)
POTASSIUM SERPL-SCNC: 4.4 MMOL/L — SIGNIFICANT CHANGE UP (ref 3.5–5.3)
RBC # BLD: 2.92 M/UL — LOW (ref 4.2–5.8)
RBC # BLD: 3.52 M/UL — LOW (ref 4.2–5.8)
RBC # FLD: 20.3 % — HIGH (ref 10.3–14.5)
RBC # FLD: 20.6 % — HIGH (ref 10.3–14.5)
SODIUM SERPL-SCNC: 133 MMOL/L — LOW (ref 135–145)
SPECIMEN SOURCE: SIGNIFICANT CHANGE UP
WBC # BLD: 14.09 K/UL — HIGH (ref 3.8–10.5)
WBC # BLD: 14.19 K/UL — HIGH (ref 3.8–10.5)
WBC # FLD AUTO: 14.09 K/UL — HIGH (ref 3.8–10.5)
WBC # FLD AUTO: 14.19 K/UL — HIGH (ref 3.8–10.5)

## 2024-09-25 PROCEDURE — 93970 EXTREMITY STUDY: CPT | Mod: 26

## 2024-09-25 PROCEDURE — 99223 1ST HOSP IP/OBS HIGH 75: CPT | Mod: GC

## 2024-09-25 PROCEDURE — 70450 CT HEAD/BRAIN W/O DYE: CPT | Mod: 26

## 2024-09-25 PROCEDURE — 71045 X-RAY EXAM CHEST 1 VIEW: CPT | Mod: 26

## 2024-09-25 RX ORDER — DEXTROSE 50 % IN WATER 50 %
12.5 SYRINGE (ML) INTRAVENOUS ONCE
Refills: 0 | Status: DISCONTINUED | OUTPATIENT
Start: 2024-09-25 | End: 2024-09-28

## 2024-09-25 RX ORDER — SODIUM CHLORIDE 9 G/1000ML
1000 INJECTION, SOLUTION INTRAVENOUS
Refills: 0 | Status: DISCONTINUED | OUTPATIENT
Start: 2024-09-25 | End: 2024-09-28

## 2024-09-25 RX ORDER — DEXTROSE 50 % IN WATER 50 %
15 SYRINGE (ML) INTRAVENOUS ONCE
Refills: 0 | Status: DISCONTINUED | OUTPATIENT
Start: 2024-09-25 | End: 2024-09-28

## 2024-09-25 RX ORDER — INSULIN LISPRO 100 U/ML
INJECTION, SOLUTION INTRAVENOUS; SUBCUTANEOUS EVERY 6 HOURS
Refills: 0 | Status: DISCONTINUED | OUTPATIENT
Start: 2024-09-25 | End: 2024-09-26

## 2024-09-25 RX ORDER — INFLUENZA A VIRUS A/IDAHO/07/2018 (H1N1) ANTIGEN (MDCK CELL DERIVED, PROPIOLACTONE INACTIVATED, INFLUENZA A VIRUS A/INDIANA/08/2018 (H3N2) ANTIGEN (MDCK CELL DERIVED, PROPIOLACTONE INACTIVATED), INFLUENZA B VIRUS B/SINGAPORE/INFTT-16-0610/2016 ANTIGEN (MDCK CELL DERIVED, PROPIOLACTONE INACTIVATED), INFLUENZA B VIRUS B/IOWA/06/2017 ANTIGEN (MDCK CELL DERIVED, PROPIOLACTONE INACTIVATED) 15; 15; 15; 15 UG/.5ML; UG/.5ML; UG/.5ML; UG/.5ML
0.5 INJECTION, SUSPENSION INTRAMUSCULAR ONCE
Refills: 0 | Status: DISCONTINUED | OUTPATIENT
Start: 2024-09-25 | End: 2024-09-28

## 2024-09-25 RX ORDER — GLUCAGON 3 MG/1
1 POWDER NASAL ONCE
Refills: 0 | Status: DISCONTINUED | OUTPATIENT
Start: 2024-09-25 | End: 2024-09-28

## 2024-09-25 RX ORDER — DEXTROSE 50 % IN WATER 50 %
25 SYRINGE (ML) INTRAVENOUS ONCE
Refills: 0 | Status: DISCONTINUED | OUTPATIENT
Start: 2024-09-25 | End: 2024-09-28

## 2024-09-25 RX ADMIN — INSULIN LISPRO 1: 100 INJECTION, SOLUTION INTRAVENOUS; SUBCUTANEOUS at 18:46

## 2024-09-26 LAB
GLUCOSE BLDC GLUCOMTR-MCNC: 127 MG/DL — HIGH (ref 70–99)
GLUCOSE BLDC GLUCOMTR-MCNC: 139 MG/DL — HIGH (ref 70–99)
HAPTOGLOB SERPL-MCNC: 418 MG/DL — HIGH (ref 34–200)
IRON SATN MFR SERPL: 15 % — LOW (ref 16–55)
IRON SATN MFR SERPL: 25 UG/DL — LOW (ref 45–165)
LDH SERPL L TO P-CCNC: 149 U/L — SIGNIFICANT CHANGE UP (ref 50–242)
TIBC SERPL-MCNC: 169 UG/DL — LOW (ref 220–430)
UIBC SERPL-MCNC: 144 UG/DL — SIGNIFICANT CHANGE UP (ref 110–370)

## 2024-09-26 PROCEDURE — 99233 SBSQ HOSP IP/OBS HIGH 50: CPT | Mod: GC

## 2024-09-26 RX ORDER — DEXTROSE 50 % IN WATER 50 %
25 SYRINGE (ML) INTRAVENOUS ONCE
Refills: 0 | Status: DISCONTINUED | OUTPATIENT
Start: 2024-09-26 | End: 2024-09-28

## 2024-09-26 RX ORDER — SODIUM CHLORIDE 9 G/1000ML
1000 INJECTION, SOLUTION INTRAVENOUS
Refills: 0 | Status: DISCONTINUED | OUTPATIENT
Start: 2024-09-26 | End: 2024-09-28

## 2024-09-26 RX ORDER — GLUCAGON 3 MG/1
1 POWDER NASAL ONCE
Refills: 0 | Status: DISCONTINUED | OUTPATIENT
Start: 2024-09-26 | End: 2024-09-28

## 2024-09-26 RX ORDER — INSULIN LISPRO 100 U/ML
INJECTION, SOLUTION INTRAVENOUS; SUBCUTANEOUS AT BEDTIME
Refills: 0 | Status: DISCONTINUED | OUTPATIENT
Start: 2024-09-26 | End: 2024-09-28

## 2024-09-26 RX ORDER — DEXTROSE 50 % IN WATER 50 %
12.5 SYRINGE (ML) INTRAVENOUS ONCE
Refills: 0 | Status: DISCONTINUED | OUTPATIENT
Start: 2024-09-26 | End: 2024-09-28

## 2024-09-26 RX ORDER — DEXTROSE 50 % IN WATER 50 %
15 SYRINGE (ML) INTRAVENOUS ONCE
Refills: 0 | Status: DISCONTINUED | OUTPATIENT
Start: 2024-09-26 | End: 2024-09-28

## 2024-09-26 RX ORDER — INSULIN LISPRO 100 U/ML
INJECTION, SOLUTION INTRAVENOUS; SUBCUTANEOUS
Refills: 0 | Status: DISCONTINUED | OUTPATIENT
Start: 2024-09-26 | End: 2024-09-28

## 2024-09-27 LAB
ANION GAP SERPL CALC-SCNC: 17 MMOL/L — SIGNIFICANT CHANGE UP (ref 5–17)
BUN SERPL-MCNC: 54 MG/DL — HIGH (ref 7–23)
CALCIUM SERPL-MCNC: 9.5 MG/DL — SIGNIFICANT CHANGE UP (ref 8.4–10.5)
CHLORIDE SERPL-SCNC: 101 MMOL/L — SIGNIFICANT CHANGE UP (ref 96–108)
CO2 SERPL-SCNC: 18 MMOL/L — LOW (ref 22–31)
CREAT SERPL-MCNC: 2.53 MG/DL — HIGH (ref 0.5–1.3)
EGFR: 27 ML/MIN/1.73M2 — LOW
EGFR: 27 ML/MIN/1.73M2 — LOW
FERRITIN SERPL-MCNC: 3187 NG/ML — HIGH (ref 30–400)
GLUCOSE BLDC GLUCOMTR-MCNC: 113 MG/DL — HIGH (ref 70–99)
GLUCOSE BLDC GLUCOMTR-MCNC: 118 MG/DL — HIGH (ref 70–99)
GLUCOSE BLDC GLUCOMTR-MCNC: 124 MG/DL — HIGH (ref 70–99)
GLUCOSE BLDC GLUCOMTR-MCNC: 130 MG/DL — HIGH (ref 70–99)
GLUCOSE BLDC GLUCOMTR-MCNC: 144 MG/DL — HIGH (ref 70–99)
GLUCOSE BLDC GLUCOMTR-MCNC: 71 MG/DL — SIGNIFICANT CHANGE UP (ref 70–99)
GLUCOSE SERPL-MCNC: 121 MG/DL — HIGH (ref 70–99)
HCT VFR BLD CALC: 26.2 % — LOW (ref 39–50)
HGB BLD-MCNC: 7.9 G/DL — LOW (ref 13–17)
MCHC RBC-ENTMCNC: 23.6 PG — LOW (ref 27–34)
MCHC RBC-ENTMCNC: 30.2 GM/DL — LOW (ref 32–36)
MCV RBC AUTO: 78.2 FL — LOW (ref 80–100)
NRBC # BLD: 0 /100 WBCS — SIGNIFICANT CHANGE UP (ref 0–0)
NRBC BLD-RTO: 0 /100 WBCS — SIGNIFICANT CHANGE UP (ref 0–0)
PLATELET # BLD AUTO: 670 K/UL — HIGH (ref 150–400)
POTASSIUM SERPL-MCNC: 3.9 MMOL/L — SIGNIFICANT CHANGE UP (ref 3.5–5.3)
POTASSIUM SERPL-SCNC: 3.9 MMOL/L — SIGNIFICANT CHANGE UP (ref 3.5–5.3)
RBC # BLD: 3.35 M/UL — LOW (ref 4.2–5.8)
RBC # FLD: 20.6 % — HIGH (ref 10.3–14.5)
SODIUM SERPL-SCNC: 136 MMOL/L — SIGNIFICANT CHANGE UP (ref 135–145)
WBC # BLD: 15.39 K/UL — HIGH (ref 3.8–10.5)
WBC # FLD AUTO: 15.39 K/UL — HIGH (ref 3.8–10.5)

## 2024-09-27 PROCEDURE — 99233 SBSQ HOSP IP/OBS HIGH 50: CPT | Mod: GC

## 2024-09-27 PROCEDURE — 99232 SBSQ HOSP IP/OBS MODERATE 35: CPT

## 2024-09-27 RX ORDER — ATORVASTATIN CALCIUM 80 MG/1
20 TABLET, FILM COATED ORAL AT BEDTIME
Refills: 0 | Status: DISCONTINUED | OUTPATIENT
Start: 2024-09-27 | End: 2024-09-28

## 2024-09-27 RX ADMIN — ATORVASTATIN CALCIUM 20 MILLIGRAM(S): 80 TABLET, FILM COATED ORAL at 21:43

## 2024-09-28 ENCOUNTER — TRANSCRIPTION ENCOUNTER (OUTPATIENT)
Age: 69
End: 2024-09-28

## 2024-09-28 VITALS
HEART RATE: 88 BPM | RESPIRATION RATE: 18 BRPM | OXYGEN SATURATION: 97 % | TEMPERATURE: 98 F | DIASTOLIC BLOOD PRESSURE: 76 MMHG | SYSTOLIC BLOOD PRESSURE: 134 MMHG

## 2024-09-28 LAB
ANION GAP SERPL CALC-SCNC: 14 MMOL/L — SIGNIFICANT CHANGE UP (ref 5–17)
BASOPHILS # BLD AUTO: 0.04 K/UL — SIGNIFICANT CHANGE UP (ref 0–0.2)
BASOPHILS NFR BLD AUTO: 0.3 % — SIGNIFICANT CHANGE UP (ref 0–2)
BUN SERPL-MCNC: 44 MG/DL — HIGH (ref 7–23)
CALCIUM SERPL-MCNC: 9.8 MG/DL — SIGNIFICANT CHANGE UP (ref 8.4–10.5)
CHLORIDE SERPL-SCNC: 101 MMOL/L — SIGNIFICANT CHANGE UP (ref 96–108)
CO2 SERPL-SCNC: 19 MMOL/L — LOW (ref 22–31)
CREAT SERPL-MCNC: 1.97 MG/DL — HIGH (ref 0.5–1.3)
EGFR: 36 ML/MIN/1.73M2 — LOW
EGFR: 36 ML/MIN/1.73M2 — LOW
EOSINOPHIL # BLD AUTO: 0.01 K/UL — SIGNIFICANT CHANGE UP (ref 0–0.5)
EOSINOPHIL NFR BLD AUTO: 0.1 % — SIGNIFICANT CHANGE UP (ref 0–6)
GLUCOSE BLDC GLUCOMTR-MCNC: 114 MG/DL — HIGH (ref 70–99)
GLUCOSE BLDC GLUCOMTR-MCNC: 119 MG/DL — HIGH (ref 70–99)
GLUCOSE BLDC GLUCOMTR-MCNC: 123 MG/DL — HIGH (ref 70–99)
GLUCOSE SERPL-MCNC: 97 MG/DL — SIGNIFICANT CHANGE UP (ref 70–99)
HCT VFR BLD CALC: 28.5 % — LOW (ref 39–50)
HGB BLD-MCNC: 8.7 G/DL — LOW (ref 13–17)
IMM GRANULOCYTES NFR BLD AUTO: 2.4 % — HIGH (ref 0–0.9)
LYMPHOCYTES # BLD AUTO: 15.9 % — SIGNIFICANT CHANGE UP (ref 13–44)
LYMPHOCYTES # BLD AUTO: 2.46 K/UL — SIGNIFICANT CHANGE UP (ref 1–3.3)
MCHC RBC-ENTMCNC: 24.8 PG — LOW (ref 27–34)
MCHC RBC-ENTMCNC: 30.5 GM/DL — LOW (ref 32–36)
MCV RBC AUTO: 81.2 FL — SIGNIFICANT CHANGE UP (ref 80–100)
MONOCYTES # BLD AUTO: 1.8 K/UL — HIGH (ref 0–0.9)
MONOCYTES NFR BLD AUTO: 11.6 % — SIGNIFICANT CHANGE UP (ref 2–14)
NEUTROPHILS # BLD AUTO: 10.84 K/UL — HIGH (ref 1.8–7.4)
NEUTROPHILS NFR BLD AUTO: 69.7 % — SIGNIFICANT CHANGE UP (ref 43–77)
NRBC # BLD: 0 /100 WBCS — SIGNIFICANT CHANGE UP (ref 0–0)
NRBC BLD-RTO: 0 /100 WBCS — SIGNIFICANT CHANGE UP (ref 0–0)
PLATELET # BLD AUTO: 666 K/UL — HIGH (ref 150–400)
POTASSIUM SERPL-MCNC: 4.1 MMOL/L — SIGNIFICANT CHANGE UP (ref 3.5–5.3)
POTASSIUM SERPL-SCNC: 4.1 MMOL/L — SIGNIFICANT CHANGE UP (ref 3.5–5.3)
RBC # BLD: 3.51 M/UL — LOW (ref 4.2–5.8)
RBC # FLD: 21.2 % — HIGH (ref 10.3–14.5)
SODIUM SERPL-SCNC: 134 MMOL/L — LOW (ref 135–145)
WBC # BLD: 15.52 K/UL — HIGH (ref 3.8–10.5)
WBC # FLD AUTO: 15.52 K/UL — HIGH (ref 3.8–10.5)

## 2024-09-28 PROCEDURE — 93970 EXTREMITY STUDY: CPT

## 2024-09-28 PROCEDURE — 97161 PT EVAL LOW COMPLEX 20 MIN: CPT

## 2024-09-28 PROCEDURE — 36430 TRANSFUSION BLD/BLD COMPNT: CPT

## 2024-09-28 PROCEDURE — 82803 BLOOD GASES ANY COMBINATION: CPT

## 2024-09-28 PROCEDURE — 36415 COLL VENOUS BLD VENIPUNCTURE: CPT

## 2024-09-28 PROCEDURE — 70450 CT HEAD/BRAIN W/O DYE: CPT | Mod: MC

## 2024-09-28 PROCEDURE — 80053 COMPREHEN METABOLIC PANEL: CPT

## 2024-09-28 PROCEDURE — P9040: CPT

## 2024-09-28 PROCEDURE — 74176 CT ABD & PELVIS W/O CONTRAST: CPT | Mod: MC

## 2024-09-28 PROCEDURE — 85025 COMPLETE CBC W/AUTO DIFF WBC: CPT

## 2024-09-28 PROCEDURE — 84295 ASSAY OF SERUM SODIUM: CPT

## 2024-09-28 PROCEDURE — 80048 BASIC METABOLIC PNL TOTAL CA: CPT

## 2024-09-28 PROCEDURE — 86920 COMPATIBILITY TEST SPIN: CPT

## 2024-09-28 PROCEDURE — 99285 EMERGENCY DEPT VISIT HI MDM: CPT

## 2024-09-28 PROCEDURE — 82728 ASSAY OF FERRITIN: CPT

## 2024-09-28 PROCEDURE — 86901 BLOOD TYPING SEROLOGIC RH(D): CPT

## 2024-09-28 PROCEDURE — 85018 HEMOGLOBIN: CPT

## 2024-09-28 PROCEDURE — 83010 ASSAY OF HAPTOGLOBIN QUANT: CPT

## 2024-09-28 PROCEDURE — 86900 BLOOD TYPING SEROLOGIC ABO: CPT

## 2024-09-28 PROCEDURE — 83550 IRON BINDING TEST: CPT

## 2024-09-28 PROCEDURE — 82330 ASSAY OF CALCIUM: CPT

## 2024-09-28 PROCEDURE — 85014 HEMATOCRIT: CPT

## 2024-09-28 PROCEDURE — 83605 ASSAY OF LACTIC ACID: CPT

## 2024-09-28 PROCEDURE — 83735 ASSAY OF MAGNESIUM: CPT

## 2024-09-28 PROCEDURE — 71045 X-RAY EXAM CHEST 1 VIEW: CPT

## 2024-09-28 PROCEDURE — 82962 GLUCOSE BLOOD TEST: CPT

## 2024-09-28 PROCEDURE — 84132 ASSAY OF SERUM POTASSIUM: CPT

## 2024-09-28 PROCEDURE — 83615 LACTATE (LD) (LDH) ENZYME: CPT

## 2024-09-28 PROCEDURE — 87086 URINE CULTURE/COLONY COUNT: CPT

## 2024-09-28 PROCEDURE — 86850 RBC ANTIBODY SCREEN: CPT

## 2024-09-28 PROCEDURE — 82947 ASSAY GLUCOSE BLOOD QUANT: CPT

## 2024-09-28 PROCEDURE — 83540 ASSAY OF IRON: CPT

## 2024-09-28 PROCEDURE — 85027 COMPLETE CBC AUTOMATED: CPT

## 2024-09-28 PROCEDURE — 81001 URINALYSIS AUTO W/SCOPE: CPT

## 2024-09-28 PROCEDURE — 84100 ASSAY OF PHOSPHORUS: CPT

## 2024-09-28 PROCEDURE — 83690 ASSAY OF LIPASE: CPT

## 2024-09-28 PROCEDURE — 82435 ASSAY OF BLOOD CHLORIDE: CPT

## 2024-09-28 RX ORDER — OXYCODONE HYDROCHLORIDE 30 MG/1
1 TABLET ORAL
Refills: 0 | DISCHARGE

## 2024-09-28 RX ADMIN — Medication 40 MILLIGRAM(S): at 05:24

## 2024-10-02 ENCOUNTER — INPATIENT (INPATIENT)
Facility: HOSPITAL | Age: 69
LOS: 11 days | Discharge: HOME | DRG: 871 | End: 2024-10-14
Attending: HOSPITALIST | Admitting: STUDENT IN AN ORGANIZED HEALTH CARE EDUCATION/TRAINING PROGRAM
Payer: MEDICARE

## 2024-10-02 VITALS
WEIGHT: 139.99 LBS | HEIGHT: 70 IN | DIASTOLIC BLOOD PRESSURE: 73 MMHG | OXYGEN SATURATION: 100 % | HEART RATE: 102 BPM | TEMPERATURE: 98 F | RESPIRATION RATE: 20 BRPM | SYSTOLIC BLOOD PRESSURE: 107 MMHG

## 2024-10-02 DIAGNOSIS — Z29.9 ENCOUNTER FOR PROPHYLACTIC MEASURES, UNSPECIFIED: ICD-10-CM

## 2024-10-02 DIAGNOSIS — E78.5 HYPERLIPIDEMIA, UNSPECIFIED: ICD-10-CM

## 2024-10-02 DIAGNOSIS — I82.409 ACUTE EMBOLISM AND THROMBOSIS OF UNSPECIFIED DEEP VEINS OF UNSPECIFIED LOWER EXTREMITY: ICD-10-CM

## 2024-10-02 DIAGNOSIS — E11.9 TYPE 2 DIABETES MELLITUS WITHOUT COMPLICATIONS: ICD-10-CM

## 2024-10-02 DIAGNOSIS — R41.0 DISORIENTATION, UNSPECIFIED: ICD-10-CM

## 2024-10-02 DIAGNOSIS — A41.9 SEPSIS, UNSPECIFIED ORGANISM: ICD-10-CM

## 2024-10-02 DIAGNOSIS — C79.10 SECONDARY MALIGNANT NEOPLASM OF UNSPECIFIED URINARY ORGANS: Chronic | ICD-10-CM

## 2024-10-02 DIAGNOSIS — Z98.890 OTHER SPECIFIED POSTPROCEDURAL STATES: Chronic | ICD-10-CM

## 2024-10-02 PROBLEM — N17.9 ACUTE KIDNEY FAILURE, UNSPECIFIED: Chronic | Status: ACTIVE | Noted: 2024-09-25

## 2024-10-02 LAB
ALBUMIN SERPL ELPH-MCNC: 2.9 G/DL — LOW (ref 3.3–5)
ALP SERPL-CCNC: 149 U/L — HIGH (ref 40–120)
ALT FLD-CCNC: 39 U/L — SIGNIFICANT CHANGE UP (ref 10–45)
ANION GAP SERPL CALC-SCNC: 13 MMOL/L — SIGNIFICANT CHANGE UP (ref 5–17)
ANISOCYTOSIS BLD QL: SLIGHT — SIGNIFICANT CHANGE UP
APPEARANCE UR: ABNORMAL
APTT BLD: 32 SEC — SIGNIFICANT CHANGE UP (ref 24.5–35.6)
AST SERPL-CCNC: 30 U/L — SIGNIFICANT CHANGE UP (ref 10–40)
BACTERIA # UR AUTO: NEGATIVE /HPF — SIGNIFICANT CHANGE UP
BASOPHILS # BLD AUTO: 0 K/UL — SIGNIFICANT CHANGE UP (ref 0–0.2)
BASOPHILS NFR BLD AUTO: 0 % — SIGNIFICANT CHANGE UP (ref 0–2)
BILIRUB SERPL-MCNC: 1.6 MG/DL — HIGH (ref 0.2–1.2)
BILIRUB UR-MCNC: NEGATIVE — SIGNIFICANT CHANGE UP
BUN SERPL-MCNC: 41 MG/DL — HIGH (ref 7–23)
CALCIUM SERPL-MCNC: 10.6 MG/DL — HIGH (ref 8.4–10.5)
CAST: 3 /LPF — SIGNIFICANT CHANGE UP (ref 0–4)
CHLORIDE SERPL-SCNC: 94 MMOL/L — LOW (ref 96–108)
CO2 SERPL-SCNC: 21 MMOL/L — LOW (ref 22–31)
COLOR SPEC: YELLOW — SIGNIFICANT CHANGE UP
CREAT SERPL-MCNC: 1.59 MG/DL — HIGH (ref 0.5–1.3)
DACRYOCYTES BLD QL SMEAR: SLIGHT — SIGNIFICANT CHANGE UP
DIFF PNL FLD: ABNORMAL
EGFR: 46 ML/MIN/1.73M2 — LOW
EOSINOPHIL # BLD AUTO: 0 K/UL — SIGNIFICANT CHANGE UP (ref 0–0.5)
EOSINOPHIL NFR BLD AUTO: 0 % — SIGNIFICANT CHANGE UP (ref 0–6)
FLUAV AG NPH QL: SIGNIFICANT CHANGE UP
FLUBV AG NPH QL: SIGNIFICANT CHANGE UP
GAS PNL BLDV: SIGNIFICANT CHANGE UP
GAS PNL BLDV: SIGNIFICANT CHANGE UP
GLUCOSE BLDC GLUCOMTR-MCNC: 116 MG/DL — HIGH (ref 70–99)
GLUCOSE SERPL-MCNC: 107 MG/DL — HIGH (ref 70–99)
GLUCOSE UR QL: NEGATIVE MG/DL — SIGNIFICANT CHANGE UP
HCT VFR BLD CALC: 24.1 % — LOW (ref 39–50)
HGB BLD-MCNC: 7.3 G/DL — LOW (ref 13–17)
INR BLD: 1.57 RATIO — HIGH (ref 0.85–1.16)
KETONES UR-MCNC: NEGATIVE MG/DL — SIGNIFICANT CHANGE UP
LEUKOCYTE ESTERASE UR-ACNC: ABNORMAL
LYMPHOCYTES # BLD AUTO: 10.6 % — LOW (ref 13–44)
LYMPHOCYTES # BLD AUTO: 2.7 K/UL — SIGNIFICANT CHANGE UP (ref 1–3.3)
MANUAL SMEAR VERIFICATION: SIGNIFICANT CHANGE UP
MCHC RBC-ENTMCNC: 24.2 PG — LOW (ref 27–34)
MCHC RBC-ENTMCNC: 30.3 GM/DL — LOW (ref 32–36)
MCV RBC AUTO: 79.8 FL — LOW (ref 80–100)
MICROCYTES BLD QL: SLIGHT — SIGNIFICANT CHANGE UP
MONOCYTES # BLD AUTO: 1.81 K/UL — HIGH (ref 0–0.9)
MONOCYTES NFR BLD AUTO: 7.1 % — SIGNIFICANT CHANGE UP (ref 2–14)
NEUTROPHILS # BLD AUTO: 20.93 K/UL — HIGH (ref 1.8–7.4)
NEUTROPHILS NFR BLD AUTO: 82.3 % — HIGH (ref 43–77)
NITRITE UR-MCNC: NEGATIVE — SIGNIFICANT CHANGE UP
OVALOCYTES BLD QL SMEAR: SLIGHT — SIGNIFICANT CHANGE UP
PH UR: 5.5 — SIGNIFICANT CHANGE UP (ref 5–8)
PLAT MORPH BLD: ABNORMAL
PLATELET # BLD AUTO: 485 K/UL — HIGH (ref 150–400)
POIKILOCYTOSIS BLD QL AUTO: SLIGHT — SIGNIFICANT CHANGE UP
POLYCHROMASIA BLD QL SMEAR: SLIGHT — SIGNIFICANT CHANGE UP
POTASSIUM SERPL-MCNC: 3.9 MMOL/L — SIGNIFICANT CHANGE UP (ref 3.5–5.3)
POTASSIUM SERPL-SCNC: 3.9 MMOL/L — SIGNIFICANT CHANGE UP (ref 3.5–5.3)
PROT SERPL-MCNC: 7.3 G/DL — SIGNIFICANT CHANGE UP (ref 6–8.3)
PROT UR-MCNC: 30 MG/DL
PROTHROM AB SERPL-ACNC: 17.8 SEC — HIGH (ref 9.9–13.4)
RBC # BLD: 3.02 M/UL — LOW (ref 4.2–5.8)
RBC # FLD: 21.2 % — HIGH (ref 10.3–14.5)
RBC BLD AUTO: ABNORMAL
RBC CASTS # UR COMP ASSIST: 4 /HPF — SIGNIFICANT CHANGE UP (ref 0–4)
REVIEW: SIGNIFICANT CHANGE UP
RSV RNA NPH QL NAA+NON-PROBE: SIGNIFICANT CHANGE UP
SARS-COV-2 RNA SPEC QL NAA+PROBE: SIGNIFICANT CHANGE UP
SODIUM SERPL-SCNC: 128 MMOL/L — LOW (ref 135–145)
SP GR SPEC: 1.02 — SIGNIFICANT CHANGE UP (ref 1–1.03)
SQUAMOUS # UR AUTO: 1 /HPF — SIGNIFICANT CHANGE UP (ref 0–5)
UROBILINOGEN FLD QL: 1 MG/DL — SIGNIFICANT CHANGE UP (ref 0.2–1)
WBC # BLD: 25.43 K/UL — HIGH (ref 3.8–10.5)
WBC # FLD AUTO: 25.43 K/UL — HIGH (ref 3.8–10.5)
WBC UR QL: 6 /HPF — HIGH (ref 0–5)

## 2024-10-02 PROCEDURE — 99285 EMERGENCY DEPT VISIT HI MDM: CPT | Mod: GC

## 2024-10-02 PROCEDURE — 71046 X-RAY EXAM CHEST 2 VIEWS: CPT | Mod: 26

## 2024-10-02 PROCEDURE — 99223 1ST HOSP IP/OBS HIGH 75: CPT | Mod: GC

## 2024-10-02 RX ORDER — SODIUM CHLORIDE IRRIG SOLUTION 0.9 %
1000 SOLUTION, IRRIGATION IRRIGATION
Refills: 0 | Status: DISCONTINUED | OUTPATIENT
Start: 2024-10-02 | End: 2024-10-14

## 2024-10-02 RX ORDER — GLUCAGON INJECTION, SOLUTION 0.5 MG/.1ML
1 INJECTION, SOLUTION SUBCUTANEOUS ONCE
Refills: 0 | Status: DISCONTINUED | OUTPATIENT
Start: 2024-10-02 | End: 2024-10-14

## 2024-10-02 RX ORDER — SODIUM CHLORIDE IRRIG SOLUTION 0.9 %
1000 SOLUTION, IRRIGATION IRRIGATION ONCE
Refills: 0 | Status: COMPLETED | OUTPATIENT
Start: 2024-10-02 | End: 2024-10-02

## 2024-10-02 RX ORDER — APIXABAN 5 MG/1
5 TABLET, FILM COATED ORAL EVERY 12 HOURS
Refills: 0 | Status: DISCONTINUED | OUTPATIENT
Start: 2024-10-02 | End: 2024-10-11

## 2024-10-02 RX ORDER — INSULIN LISPRO 100/ML
VIAL (ML) SUBCUTANEOUS AT BEDTIME
Refills: 0 | Status: DISCONTINUED | OUTPATIENT
Start: 2024-10-03 | End: 2024-10-14

## 2024-10-02 RX ORDER — PANTOPRAZOLE SODIUM 40 MG/1
40 TABLET, DELAYED RELEASE ORAL
Refills: 0 | Status: DISCONTINUED | OUTPATIENT
Start: 2024-10-02 | End: 2024-10-14

## 2024-10-02 RX ORDER — ALCOHOL ANTISEPTIC PADS
25 PADS, MEDICATED (EA) TOPICAL ONCE
Refills: 0 | Status: DISCONTINUED | OUTPATIENT
Start: 2024-10-02 | End: 2024-10-14

## 2024-10-02 RX ORDER — ALCOHOL ANTISEPTIC PADS
15 PADS, MEDICATED (EA) TOPICAL ONCE
Refills: 0 | Status: DISCONTINUED | OUTPATIENT
Start: 2024-10-02 | End: 2024-10-14

## 2024-10-02 RX ORDER — INSULIN LISPRO 100/ML
VIAL (ML) SUBCUTANEOUS
Refills: 0 | Status: DISCONTINUED | OUTPATIENT
Start: 2024-10-02 | End: 2024-10-14

## 2024-10-02 RX ORDER — APIXABAN 2.5 MG/1
1 TABLET, FILM COATED ORAL
Refills: 0 | DISCHARGE

## 2024-10-02 RX ORDER — ATORVASTATIN CALCIUM 10 MG/1
20 TABLET, FILM COATED ORAL AT BEDTIME
Refills: 0 | Status: DISCONTINUED | OUTPATIENT
Start: 2024-10-02 | End: 2024-10-03

## 2024-10-02 RX ORDER — ALCOHOL ANTISEPTIC PADS
12.5 PADS, MEDICATED (EA) TOPICAL ONCE
Refills: 0 | Status: DISCONTINUED | OUTPATIENT
Start: 2024-10-02 | End: 2024-10-14

## 2024-10-02 RX ORDER — PIPERACILLIN SODIUM AND TAZOBACTAM SODIUM 12; 1.5 G/60ML; G/60ML
3.38 INJECTION, POWDER, LYOPHILIZED, FOR SOLUTION INTRAVENOUS ONCE
Refills: 0 | Status: COMPLETED | OUTPATIENT
Start: 2024-10-02 | End: 2024-10-02

## 2024-10-02 RX ADMIN — Medication 1000 MILLILITER(S): at 15:58

## 2024-10-02 RX ADMIN — PIPERACILLIN SODIUM AND TAZOBACTAM SODIUM 200 GRAM(S): 12; 1.5 INJECTION, POWDER, LYOPHILIZED, FOR SOLUTION INTRAVENOUS at 17:21

## 2024-10-02 NOTE — ED PROVIDER NOTE - PROGRESS NOTE DETAILS
Bladder scan performed with POCUS, 170ml Menza: Labs with white count, UA equivocal, creatinine improving. Antibiotics ordered, patient remains hemodynamically stable. Discussed with hospitalist, they will follow pending CT.

## 2024-10-02 NOTE — ED PROVIDER NOTE - NSICDXPASTMEDICALHX_GEN_ALL_CORE_FT
PAST MEDICAL HISTORY:  Acute kidney injury superimposed on CKD     Anxiety     Asthma Denies use of Inhaler at this time    Bilateral carpal tunnel syndrome     Coronary artery disease     Glaucoma bilat. " elevated pressure"- notes eye drops have been stopped for a year now    Herniated disc     Hyperlipidemia     Hypertension     Kidney stone on right side     Metastatic urothelial carcinoma     Neuropathy fingers ,hands ,feet-bilateral    Stented coronary artery 2010 RIGHT Coronary Artery Stent    Type 2 diabetes mellitus

## 2024-10-02 NOTE — H&P ADULT - NSHPREVIEWOFSYSTEMS_GEN_ALL_CORE
REVIEW OF SYSTEMS:    CONSTITUTIONAL:  No weakness, fevers, or chills  EYES/ENT:  No visual changes, vertigo, or throat pain   NECK:  No pain or stiffness  RESPIRATORY:  No SOB, cough, wheezing, or hemoptysis  CARDIOVASCULAR:  No chest pain or palpitations  GASTROINTESTINAL:  No abdominal pain, nausea, vomiting, or hematemesis; No diarrhea or constipation; No melena or hematochezia.  GENITOURINARY:  No dysuria, change in frequency, or hematuria  NEUROLOGICAL:  No numbness or weakness  SKIN:  No itching or rashes

## 2024-10-02 NOTE — ED ADULT NURSE NOTE - NS ED NURSE LEVEL OF CONSCIOUSNESS ORIENTATION
HPI:  Kobi Malik is a 43 year old male who presents to the  for evaluation of persistent cough. Patient reports about 4 week- history of persistent phlegmy cough with scant sputum associated with malaise, chills, some body aches, coughing fits and intermittent wheezing, nasal congestion and postnasal drip. The patient denies pleuritic chest pain, fever,  nausea, vomiting. Patient is not smoker. he has taken over-the-counter medications without relief.    PAST MEDICAL HISTORY:    Diabetes mellitus (CMS/[facility])                                   Dyslipidemia                                                  Depression with anxiety                                       Overweight(278.02)                                            Anxiety                                                       Depressive disorder                                           DM (diabetes mellitus), type 2, uncontrolled (* 3/29/2016     Diabetes type 2, controlled (CMS/[facility])           7/8/2016      Diabetic polyneuropathy associated with type 2* 2/27/2017     Diabetes mellitus type 2 with complications, u* 2/27/2017     Varicose veins of leg with pain                 4/25/2017     Vitamin D deficiency                            6/13/2017     Essential (primary) hypertension                              MESSI (obstructive sleep apnea)                                   Comment: does not use CPAP     Current Outpatient Medications   Medication Sig Dispense Refill   • PARoxetine (PAXIL) 30 MG tablet Take 1.5 tablets by mouth daily. 30 tablet 11   • traMADol (ULTRAM) 50 MG tablet 1 or 2 tabs by mouth every 6 hours as needed for pain 50 tablet 0   • metFORMIN (GLUCOPHAGE-XR) 500 MG 24 hr tablet Take 1,000 mg by mouth 2 times daily.  2   • Pediatric Multivit-Minerals-C (CHEWABLES MULTIVITAMIN PO) Take 1 capsule by mouth daily.      • cyanocobalamin (VITAMIN B-12) 500 MCG tablet Take one tab by mouth every other day.     • FREESTYLE LITE test strip  TESTS BLOOD SUGARS FOUR TIMES DAILY 400 strip 1   • cefdinir (OMNICEF) 300 MG capsule Take 1 capsule by mouth 2 times daily for 10 days. 20 capsule 0   • albuterol 108 (90 Base) MCG/ACT inhaler Inhale 2 puffs into the lungs every 6 hours as needed for Shortness of Breath or Wheezing. 1 Inhaler 0   • predniSONE (DELTASONE) 20 MG tablet Take 2 tablets by mouth daily for 5 days. 10 tablet 0   • sharps container For insulin pen needles, lancets, and bydureon 1 each 3   • Blood Glucose Monitoring Suppl (FREESTYLE LITE) JAEL Test up to 4 x/day 1 Device 11     No current facility-administered medications for this visit.         Allergies as of 03/15/2020   • (No Known Allergies)        Social History     Tobacco Use   • Smoking status: Never Smoker   • Smokeless tobacco: Never Used   Substance Use Topics   • Alcohol use: Yes     Frequency: Never     Drinks per session: 1 or 2     Binge frequency: Never     Comment: 1-2 per month   • Drug use: No        REVIEW OF SYSTEMS : systems reviewed and negative or non-contributory except as described in HPI.    PHYSICAL EXAM:  Visit Vitals  BP (!) 140/88   Pulse 78   Temp 97.7 °F (36.5 °C) (Tympanic)   Resp 20   Ht 6' 5\" (1.956 m)   Wt (!) 137.8 kg   SpO2 98%   BMI 36.02 kg/m²        CONSTITUTIONAL:Lucid, oriented ×3 . No acute distress.  EARS:  External ears without deformities. Hearing is grossly normal. Bilateral ear canals and tympanic membranes are intact.  NOSE:  Bilateral nasal congestion.  MOUTH: Oral mucosa is moist. Oropharynx is clear without inflammatory changes or exudates.   NECK: Supple. No lymphadenopathy, masses or thyroid enlargement. There is full range of motion.   CHEST: Symmetric. No use of accessory muscles of respiration Lungs: Scattered rhonchi without wheezing or consolidation signs.   CARDIOVASCULAR: Regular rate and rhythm with normal S1 and S2. There are no murmurs auscultated. No rubs. .   SKIN: Warm, dry, intact without rash or lesion.  LYMPHATIC: No  evidence of lymph nodes enlargement    ASSESSMENT AND PLAN:    Kobi was seen today for cough and sinus problem.    Diagnoses and all orders for this visit:    Bronchitis with bronchospasm  -     cefdinir (OMNICEF) 300 MG capsule; Take 1 capsule by mouth 2 times daily for 10 days.  -     albuterol 108 (90 Base) MCG/ACT inhaler; Inhale 2 puffs into the lungs every 6 hours as needed for Shortness of Breath or Wheezing.  -     predniSONE (DELTASONE) 20 MG tablet; Take 2 tablets by mouth daily for 5 days.    -over-the-counter cough syrup every 6 hours as needed  -AVS educational material provided  -Schedule an appointment as soon as possible for reevaluation if symptoms worsen  -Increase fluids orally  Return in about 1 week (around 3/22/2020), or if symptoms worsen or fail to improve, for with PCP.           Oriented - self; Oriented - place; Oriented - time

## 2024-10-02 NOTE — ED ADULT TRIAGE NOTE - CHIEF COMPLAINT QUOTE
Dx - UTI, increasing confusion. Alert & oriented x3. foleycatheter removed yesterday. Dx - UTI, increasing confusion as relayed by family to EMS>  Alert & oriented x3 .  foleycatheter removed yesterday.

## 2024-10-02 NOTE — H&P ADULT - PROBLEM SELECTOR PLAN 3
- H/o metastatic urothelial cancer c/b tumor invasion of L ureter  - Follows with [facility] on active chemotherapy  > Heme/onc consult with [facility] in AM

## 2024-10-02 NOTE — H&P ADULT - ATTENDING COMMENTS
69M w/ hx of metastatic L kidney/urothelial carcinoma (c/b L-sided hydronephrosis, on Padcev last dose 9/13/24), R urolithiasis, prior DVT  (on Eliquis), HTN, HLD, DM2, CAD, chronic hyponatremia, recent admission (9/24/24-9/28/24) for YANIRA on CKD due to tumor burden s/p nolen placement, now presenting with worsening confusion since discharge. Was following up at [facility] [place], where they found positive UA, removed nolen, and reportedly received abx prior to sending to hospital. On my encounter, pt was AAOx3 and pleasant, but unreliable historian and offered no complaints. Denied f/c, dysuria.    Initial VS afebrile, HR to 100s, SBP 90s-100s. Labs notable for WBC to 25k (neutrophil predominant), Na 128, SCr 1.59, lactate 2.4. UA equivocal with only 6 WBCs and trace LE, neg bacteria, neg nitrite (but supposedly received abx prior). COVID-19/RSV/Flu neg. CXR clear. Recent CT A/P (9/28/24) showed "similar size of dominant 7 cm left renal mass; increased size of right hepatic lobe lesions; increased and decreased size of left perinephric masses; decreased size of left para-aortic lymph node; 0.4 cm distal right ureteral stone, with proximal hydroureter and   perivesicular fat stranding."    Overall, concern for UTI c/b severe sepsis. Likely i/s/o recent indwelling nolen, underlying urothelial ca tumor invasion, and possibly R ureteral stone. C/w empiric Zosyn for now. Monitor WBC, fever curve, and SCr (improved compared to prior and appears to be around recent baseline of 1.6-2.0). Na appears to be close to baseline, continue to monitor. F/u BCx, UCx, MRSA/MSSA. Last CT A/P was ~1 wk ago, can f/u US kidney/bladder for now to further assess known R ureteral stone. Pending imaging, consider Urology consult. Per recent Urology recs on last admission, pt opted for medical expulsive therapy for R urolithasis given passable size; otherwise no surgical intervention was offered for chronic L-sided hydro as L kidney was atrophic and not amenable to stent/PCN. If mental status not improving, consider MRI head to r/o brain mets. Rest of care as per plan above. 69M w/ hx of metastatic L kidney/urothelial carcinoma (c/b L-sided hydronephrosis, on Padcev last dose 9/13/24), R urolithiasis, prior DVT  (on Eliquis), HTN, HLD, DM2, CAD, chronic hyponatremia, recent admission (9/24/24-9/28/24) for YANIRA on CKD due to tumor burden s/p nolen placement, now presenting with worsening confusion since discharge. Was following up at [facility] clinic, where they found positive UA, removed nolen, and reportedly received abx prior to sending to hospital. On my encounter, pt was AAOx3 and pleasant, but unreliable historian and offered no complaints. Denied f/c, dysuria.    Initial VS afebrile, HR to 100s, SBP 90s-100s. Exam w/o abdominal TTP or CVA tenderness. Labs notable for WBC to 25k (neutrophil predominant), Na 128, SCr 1.59, lactate 2.4. UA equivocal with only 6 WBCs and trace LE, neg bacteria, neg nitrite (but supposedly received abx prior). COVID-19/RSV/Flu neg. CXR clear. Recent CT A/P (9/28/24) showed "similar size of dominant 7 cm left renal mass; increased size of right hepatic lobe lesions; increased and decreased size of left perinephric masses; decreased size of left para-aortic lymph node; 0.4 cm distal right ureteral stone, with proximal hydroureter and perivesicular fat stranding."    Overall, concern for UTI c/b severe sepsis. Likely i/s/o recent indwelling nolen, underlying urothelial ca tumor invasion, and possibly R ureteral stone. C/w empiric Zosyn for now. Monitor WBC, fever curve, and SCr (improved compared to prior and appears to be around recent baseline of 1.6-2.0). Na appears to be close to baseline, continue to monitor. F/u BCx, UCx, MRSA/MSSA. Last CT A/P was ~1 wk ago, can f/u US kidney/bladder for now to further assess known R ureteral stone. Pending imaging, consider Urology consult. Per recent Urology recs on last admission, pt opted for medical expulsive therapy for R urolithasis given passable size; otherwise no surgical intervention was offered for chronic L-sided hydro as L kidney was atrophic and not amenable to stent/PCN. If mental status not improving, consider MRI head to r/o brain mets. Rest of care as per plan above.

## 2024-10-02 NOTE — H&P ADULT - ASSESSMENT
Mr. Derek Vila is a 69-year-old male w/ PMH of HTN, HLD, T2DM, CAD and metastatic urothelial CA (follows with [facility]) c/b tumor invasion and hydronephrosis, and recent admission for YANIRA on CKD that improved with Elias catheter placement presenting for mild confusion and fatigue since discharge. In the ED. patient was afebrile but tachycardic to 102. Labs significant for WBC of 25.43 and UA c/f infection. Patient admitted for AMS iso likely sepsis 2/2 UTI. Mr. Derek Vila is a 69-year-old male w/ PMH of HTN, HLD, T2DM, CAD and metastatic urothelial CA (follows with [facility]) c/b tumor invasion and hydronephrosis, and recent admission for YANIRA on CKD that improved with Elias catheter placement presenting for mild confusion and fatigue since discharge. In the ED. patient was afebrile but tachycardic to 102. Labs significant for WBC of 25.43 and UA indeterminate. Patient admitted for AMS iso likely sepsis 2/2 UTI.

## 2024-10-02 NOTE — ED PROVIDER NOTE - OBJECTIVE STATEMENT
This is a 69-year-old male with history of renal cancer last chemo about 3 weeks ago, recent hospitalization for YANIRA on CKD, ureterolithiasis, presenting for altered mental status.  Since being discharged 4 days ago patient and family have noted worsening confusion.  Patient denies any complaints including fever, chills, abdominal pain, flank pain, difficulty urinating.  Family reports that today he went to [facility] when he was confused they checked his urine was told was positive and received antibiotics and was sent to the ED.

## 2024-10-02 NOTE — ED PROVIDER NOTE - PHYSICAL EXAMINATION
Physical Exam:  General: NAD, Conversive  Eyes: EOMI, Conjunctiva and sclera clear  HEENT: MM dry  Neck: No JVD  Lungs: Clear to auscultation bilaterally, no wheeze, no rhonchi  Heart: Normal S1, S2, no murmurs  Abdomen: Soft, nontender, nondistended, no CVA tenderness  Extremities: 2+ peripheral pulses, no edema  Psych: AAO X3  Neurologic: AOx3 but confused appearing. Moving all 4 extremities, no cranial nerve deficit

## 2024-10-02 NOTE — H&P ADULT - NSICDXPASTMEDICALHX_GEN_ALL_CORE_FT
PAST MEDICAL HISTORY:  Acute kidney injury superimposed on CKD     Anxiety     Bilateral carpal tunnel syndrome     Coronary artery disease     Glaucoma bilat. " elevated pressure"- notes eye drops have been stopped for a year now    Herniated disc     Hyperlipidemia     Hypertension     Kidney stone on right side     Metastatic urothelial carcinoma     Neuropathy fingers ,hands ,feet-bilateral    Stented coronary artery 2010 RIGHT Coronary Artery Stent    Type 2 diabetes mellitus

## 2024-10-02 NOTE — H&P ADULT - NSHPPHYSICALEXAM_GEN_ALL_CORE
T(C): 36.9 (10-02-24 @ 20:43), Max: 37.4 (10-02-24 @ 15:40)  HR: 92 (10-02-24 @ 20:43) (89 - 102)  BP: 111/74 (10-02-24 @ 20:43) (97/64 - 111/74)  RR: 18 (10-02-24 @ 20:43) (18 - 20)  SpO2: 98% (10-02-24 @ 20:43) (95% - 100%)    CONSTITUTIONAL: Well groomed, no apparent distress  EYES: PERRLA and symmetric, EOMI, No conjunctival or scleral injection, non-icteric  ENMT: Oral mucosa with moist membranes. Normal dentition; no pharyngeal injection or exudates             NECK: Supple, symmetric and without tracheal deviation   RESP: No respiratory distress, no use of accessory muscles; CTA b/l, no WRR  CV: RRR, +S1S2, no MRG; no JVD; no peripheral edema  GI: Soft, NT, ND, no rebound, no guarding; no palpable masses; no hepatosplenomegaly; no hernia palpated  LYMPH: No cervical LAD or tenderness; no axillary LAD or tenderness; no inguinal LAD or tenderness  MSK: Normal gait; No digital clubbing or cyanosis; examination of the (head/neck/spine/ribs/pelvis, RUE, LUE, RLE, LLE) without misalignment,            Normal ROM without pain, no spinal tenderness, normal muscle strength/tone  SKIN: No rashes or ulcers noted; no subcutaneous nodules or induration palpable  NEURO: CN II-XII intact; normal reflexes in upper and lower extremities, sensation intact in upper and lower extremities b/l to light touch   PSYCH: Appropriate insight/judgment; A+O x 3, mood and affect appropriate, recent/remote memory intact

## 2024-10-02 NOTE — ED ADULT NURSE NOTE - OBJECTIVE STATEMENT
Pt is a 68 y/o M with PMH of Metastatic urothelial carcinoma, CAD, DM II, Neuropathy, Asthma. HLD, HTN, Glaucoma, and Anxiety BIB EMS with family c/o pt AMS. Family endorses recently d/c from hospital with UTI and Elias catheter removal today at [facility]. Pt family endorses pt "more confused than normal" following removal of Elias. Pt is poor historian of HPI.  Upon assessment pt is a/o x 3 speaking coherently in full sentences. Pt is breathing unlabored and equal BL in no apparent distress, radial pulses are 2+ BL, abdomen is soft, nontender, and nondistended, skin is warm and dry. Pt denies fevers/chills, headaches/vision changes, chest pain/SOB, n/v/d, or changes in urinary/defecation habits. Pt is in stretcher in lowest position with side rails up.

## 2024-10-02 NOTE — ED PROVIDER NOTE - ATTENDING CONTRIBUTION TO CARE
69-year-old male PMH DMT2, HTN, renal stones, carotid stenosis, left kidney static CA to liver with moderate hydro status post stent.  Patient was recently admitted discharge 9/28/24.  Now returns with complaint of confusion.  [place] [facility] [place]'d he was unable to urinate subsequently, wife states he has not been eating and drinking that he was confused.  Labs done there showed white count 28,000 he was referred to the ER for concerns for UTI.  Patient denies chest pain, abdominal pain, nausea vomiting, fever chills, dysuria.  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  Physical exam adult male awake alert GCS 15 speech fluent oriented x 2-3.   HEENT normocephalic atraumatic neck supple.  Chest clear A&P.  CV no rubs, gallops murmurs.  Abdomen soft positive bowel sounds.,  No rebound guarding masses or  Neuro GCS 15 speech fluent patient oriented timesx 2-3 [knows the month but not the year, cannot name [place] president]  rw

## 2024-10-02 NOTE — H&P ADULT - PROBLEM SELECTOR PLAN 2
- Reportedly on Eliquis for LE DVT  - No evidence of DVT during recent admission, but heme/onc recommended continuation iso active malignancy  > C/w Eliquis 5mg BID

## 2024-10-02 NOTE — H&P ADULT - TIME BILLING
reviewing prior documentation, independently obtaining a history, performing a physical examination, reviewing and independently interpreting tests/imaging, discussing the plan with the patient, ordering medications/tests, and documenting clinical information in the electronic health record.

## 2024-10-02 NOTE — H&P ADULT - NSICDXPASTSURGICALHX_GEN_ALL_CORE_FT
PAST SURGICAL HISTORY:  Metastatic urothelial carcinoma     S/P angioplasty with stent x 1 2011    S/P hernia surgery 2010    S/P shoulder surgery 1971, 1975- LEFT Shoulder    Tendon laceration left hand 1988

## 2024-10-02 NOTE — ED ADULT NURSE REASSESSMENT NOTE - NS ED NURSE REASSESS COMMENT FT1
Pt straight cathed for urine with second RN at bedside ensuring sterile technique. Pt voided 100 cc's of clear yellow urine tolerating procedure well. Urine sample sent to lab.

## 2024-10-02 NOTE — H&P ADULT - NSHPLABSRESULTS_GEN_ALL_CORE
7.3    25.43 )-----------( 485      ( 02 Oct 2024 17:03 )             24.1       10    128[L]  |  94[L]  |  41[H]  ----------------------------<  107[H]  3.9   |  21[L]  |  1.59[H]    Ca    10.6[H]      02 Oct 2024 16:31    TPro  7.3  /  Alb  2.9[L]  /  TBili  1.6[H]  /  DBili  x   /  AST  30  /  ALT  39  /  AlkPhos  149[H]  10-02              Urinalysis Basic - ( 02 Oct 2024 17:45 )    Color: Yellow / Appearance: Cloudy / S.018 / pH: x  Gluc: x / Ketone: Negative mg/dL  / Bili: Negative / Urobili: 1.0 mg/dL   Blood: x / Protein: 30 mg/dL / Nitrite: Negative   Leuk Esterase: Trace / RBC: 4 /HPF / WBC 6 /HPF   Sq Epi: x / Non Sq Epi: 1 /HPF / Bacteria: Negative /HPF        PT/INR - ( 02 Oct 2024 16:31 )   PT: 17.8 sec;   INR: 1.57 ratio         PTT - ( 02 Oct 2024 16:31 )  PTT:32.0 sec          CAPILLARY BLOOD GLUCOSE

## 2024-10-02 NOTE — ED PROVIDER NOTE - NSICDXPASTSURGICALHX_GEN_ALL_CORE_FT
PAST SURGICAL HISTORY:  H/O colonoscopy     Metastatic urothelial carcinoma     S/P angioplasty with stent x 1 2011    S/P hernia surgery 2010    S/P shoulder surgery 1971, 1975- LEFT Shoulder    Tendon laceration left hand 1988

## 2024-10-02 NOTE — H&P ADULT - HISTORY OF PRESENT ILLNESS
Mr. Derek Vila is a 69-year-old male w/ PMH of HTN, HLD, T2DM, CAD and metastatic urothelial CA ([place] [facility] [place] c/b tumor invasion and hydronephrosis, and recent admission for YANIRA on CKD that improved with Elias catheter placement presenting for mild confusion and fatigue since discharge. Patient reportedly went to appt at [facility] where UA was obtained c/f UTI and patient was instructed to present to the ED. Patient otherwise denies fever, chills, chest pain, SOB, nausea, vomiting, diarrhea, constipation, or dysuria. In the ED. patient was afebrile but tachycardic to 102. Labs significant for WBC of 25.43 and UA c/f infection. RVP and CXR unremarkable. Patient admitted for AMS iso likely sepsis 2/2 UTI. Mr. Derek Vila is a 69-year-old male w/ PMH of HTN, HLD, T2DM, CAD and metastatic urothelial CA ([facility]) c/b tumor invasion and hydronephrosis, and recent admission for YANIRA on CKD that improved with Elias catheter placement presenting for mild confusion and fatigue since discharge. Patient reportedly went to [facility] where UA was obtained c/f UTI and patient was instructed to present to the ED. Patient otherwise denies fever, chills, chest pain, SOB, nausea, vomiting, diarrhea, constipation, or dysuria. In the ED. patient was afebrile but tachycardic to 102. Labs significant for WBC of 25.43 and UA indeterminate. RVP and CXR unremarkable. Patient admitted for AMS iso likely sepsis 2/2 UTI.

## 2024-10-02 NOTE — ED PROVIDER NOTE - CLINICAL SUMMARY MEDICAL DECISION MAKING FREE TEXT BOX
This is a 69-year-old male with history as above presenting for reported UTI.  Vitals with slight tachycardia, exam as above.  Highest concern for UTI given history.  Will obtain labs, UA, x-ray of the chest, COVID swab to look for other possible source of infection.  Will also obtain CT of the abdomen pelvis to ensure there is no evidence of urinary obstruction in setting of UTI. This is a 69-year-old male with history as above presenting for reported UTI.  Vitals with slight tachycardia, exam as above.  Highest concern for UTI given history.  Will obtain labs, UA, x-ray of the chest, COVID swab to look for other possible source of infection.  Will also obtain CT of the abdomen pelvis to ensure there is no evidence of urinary obstruction in setting of UTI given his complex anatomy and history, however less concern with no pain.

## 2024-10-02 NOTE — H&P ADULT - PROBLEM SELECTOR PLAN 1
- P/w confusion, lethargy following admission for YANIRA on CKD s/p Elias placement (since removed)  - In the ED, tachycardic to102, WBC of 25.43, UA positive; s/p Zosyn x1  > C/w Zosyn for complicated UTI  > F/u urine, blood cultures - P/w confusion, lethargy following admission for YANIRA on CKD s/p Elias placement (since removed)  - In the ED, tachycardic to102, WBC of 25.43, UA indeterminate; s/p Zosyn x1  > C/w Zosyn for complicated UTI  > F/u renal US given past ureteral stone, renal mass invasion  > F/u urine, blood cultures

## 2024-10-02 NOTE — ED ADULT NURSE NOTE - CHIEF COMPLAINT QUOTE
Dx - UTI, increasing confusion as relayed by family to EMS>  Alert & oriented x3 .  foleycatheter removed yesterday.

## 2024-10-02 NOTE — H&P ADULT - NSHPSOCIALHISTORY_GEN_ALL_CORE
Ex-smoker, 30 pack years, quit in 2010; occasional alcohol use; has used medical marijuana in past; lives at home with wife

## 2024-10-03 LAB
ALBUMIN SERPL ELPH-MCNC: 2.6 G/DL — LOW (ref 3.3–5)
ALP SERPL-CCNC: 160 U/L — HIGH (ref 40–120)
ALT FLD-CCNC: 42 U/L — SIGNIFICANT CHANGE UP (ref 10–45)
ANION GAP SERPL CALC-SCNC: 13 MMOL/L — SIGNIFICANT CHANGE UP (ref 5–17)
AST SERPL-CCNC: 38 U/L — SIGNIFICANT CHANGE UP (ref 10–40)
BILIRUB SERPL-MCNC: 0.5 MG/DL — SIGNIFICANT CHANGE UP (ref 0.2–1.2)
BUN SERPL-MCNC: 33 MG/DL — HIGH (ref 7–23)
CALCIUM SERPL-MCNC: 10 MG/DL — SIGNIFICANT CHANGE UP (ref 8.4–10.5)
CHLORIDE SERPL-SCNC: 96 MMOL/L — SIGNIFICANT CHANGE UP (ref 96–108)
CO2 SERPL-SCNC: 21 MMOL/L — LOW (ref 22–31)
CREAT SERPL-MCNC: 1.58 MG/DL — HIGH (ref 0.5–1.3)
EGFR: 47 ML/MIN/1.73M2 — LOW
GLUCOSE BLDC GLUCOMTR-MCNC: 127 MG/DL — HIGH (ref 70–99)
GLUCOSE BLDC GLUCOMTR-MCNC: 132 MG/DL — HIGH (ref 70–99)
GLUCOSE BLDC GLUCOMTR-MCNC: 142 MG/DL — HIGH (ref 70–99)
GLUCOSE BLDC GLUCOMTR-MCNC: 149 MG/DL — HIGH (ref 70–99)
GLUCOSE SERPL-MCNC: 110 MG/DL — HIGH (ref 70–99)
HCT VFR BLD CALC: 24.6 % — LOW (ref 39–50)
HGB BLD-MCNC: 7.5 G/DL — LOW (ref 13–17)
MAGNESIUM SERPL-MCNC: 1.7 MG/DL — SIGNIFICANT CHANGE UP (ref 1.6–2.6)
MCHC RBC-ENTMCNC: 24 PG — LOW (ref 27–34)
MCHC RBC-ENTMCNC: 30.5 GM/DL — LOW (ref 32–36)
MCV RBC AUTO: 78.8 FL — LOW (ref 80–100)
MRSA PCR RESULT.: SIGNIFICANT CHANGE UP
NRBC # BLD: 0 /100 WBCS — SIGNIFICANT CHANGE UP (ref 0–0)
PHOSPHATE SERPL-MCNC: 2.9 MG/DL — SIGNIFICANT CHANGE UP (ref 2.5–4.5)
PLATELET # BLD AUTO: 461 K/UL — HIGH (ref 150–400)
POTASSIUM SERPL-MCNC: 3.9 MMOL/L — SIGNIFICANT CHANGE UP (ref 3.5–5.3)
POTASSIUM SERPL-SCNC: 3.9 MMOL/L — SIGNIFICANT CHANGE UP (ref 3.5–5.3)
PROT SERPL-MCNC: 6.8 G/DL — SIGNIFICANT CHANGE UP (ref 6–8.3)
RBC # BLD: 3.12 M/UL — LOW (ref 4.2–5.8)
RBC # FLD: 21.4 % — HIGH (ref 10.3–14.5)
S AUREUS DNA NOSE QL NAA+PROBE: SIGNIFICANT CHANGE UP
SODIUM SERPL-SCNC: 130 MMOL/L — LOW (ref 135–145)
WBC # BLD: 22.14 K/UL — HIGH (ref 3.8–10.5)
WBC # FLD AUTO: 22.14 K/UL — HIGH (ref 3.8–10.5)

## 2024-10-03 PROCEDURE — 99233 SBSQ HOSP IP/OBS HIGH 50: CPT

## 2024-10-03 PROCEDURE — 74176 CT ABD & PELVIS W/O CONTRAST: CPT | Mod: 26

## 2024-10-03 RX ORDER — PANTOPRAZOLE SODIUM 40 MG/1
40 TABLET, DELAYED RELEASE ORAL
Refills: 0 | Status: DISCONTINUED | OUTPATIENT
Start: 2024-10-03 | End: 2024-10-03

## 2024-10-03 RX ORDER — ONDANSETRON HCL/PF 4 MG/2 ML
4 VIAL (ML) INJECTION ONCE
Refills: 0 | Status: COMPLETED | OUTPATIENT
Start: 2024-10-03 | End: 2024-10-03

## 2024-10-03 RX ORDER — PIPERACILLIN SODIUM AND TAZOBACTAM SODIUM 12; 1.5 G/60ML; G/60ML
3.38 INJECTION, POWDER, LYOPHILIZED, FOR SOLUTION INTRAVENOUS EVERY 8 HOURS
Refills: 0 | Status: DISCONTINUED | OUTPATIENT
Start: 2024-10-03 | End: 2024-10-04

## 2024-10-03 RX ORDER — ATORVASTATIN CALCIUM 10 MG/1
10 TABLET, FILM COATED ORAL AT BEDTIME
Refills: 0 | Status: DISCONTINUED | OUTPATIENT
Start: 2024-10-03 | End: 2024-10-14

## 2024-10-03 RX ORDER — METOPROLOL TARTRATE 50 MG
25 TABLET ORAL
Refills: 0 | Status: DISCONTINUED | OUTPATIENT
Start: 2024-10-03 | End: 2024-10-14

## 2024-10-03 RX ORDER — INFLUENZA VIRUS VACCINE 15; 15; 15; 15 UG/.5ML; UG/.5ML; UG/.5ML; UG/.5ML
0.5 SUSPENSION INTRAMUSCULAR ONCE
Refills: 0 | Status: DISCONTINUED | OUTPATIENT
Start: 2024-10-03 | End: 2024-10-14

## 2024-10-03 RX ORDER — ASPIRIN 325 MG
81 TABLET ORAL DAILY
Refills: 0 | Status: DISCONTINUED | OUTPATIENT
Start: 2024-10-03 | End: 2024-10-14

## 2024-10-03 RX ADMIN — Medication 25 MILLIGRAM(S): at 17:11

## 2024-10-03 RX ADMIN — PIPERACILLIN SODIUM AND TAZOBACTAM SODIUM 25 GRAM(S): 12; 1.5 INJECTION, POWDER, LYOPHILIZED, FOR SOLUTION INTRAVENOUS at 17:12

## 2024-10-03 RX ADMIN — APIXABAN 5 MILLIGRAM(S): 5 TABLET, FILM COATED ORAL at 17:12

## 2024-10-03 RX ADMIN — PIPERACILLIN SODIUM AND TAZOBACTAM SODIUM 25 GRAM(S): 12; 1.5 INJECTION, POWDER, LYOPHILIZED, FOR SOLUTION INTRAVENOUS at 08:58

## 2024-10-03 RX ADMIN — PIPERACILLIN SODIUM AND TAZOBACTAM SODIUM 25 GRAM(S): 12; 1.5 INJECTION, POWDER, LYOPHILIZED, FOR SOLUTION INTRAVENOUS at 01:34

## 2024-10-03 RX ADMIN — Medication 4 MILLIGRAM(S): at 10:54

## 2024-10-03 RX ADMIN — APIXABAN 5 MILLIGRAM(S): 5 TABLET, FILM COATED ORAL at 05:41

## 2024-10-03 RX ADMIN — ATORVASTATIN CALCIUM 10 MILLIGRAM(S): 10 TABLET, FILM COATED ORAL at 22:34

## 2024-10-03 RX ADMIN — Medication 81 MILLIGRAM(S): at 11:52

## 2024-10-03 RX ADMIN — PANTOPRAZOLE SODIUM 40 MILLIGRAM(S): 40 TABLET, DELAYED RELEASE ORAL at 05:41

## 2024-10-03 NOTE — CONSULT NOTE ADULT - SUBJECTIVE AND OBJECTIVE BOX
Reason for consult: renal ca    HPI:  Mr. Derek Vila is a 69-year-old male w/ PMH of HTN, HLD, T2DM, CAD and metastatic urothelial CA (follows with [facility] [place]) c/b tumor invasion and hydronephrosis, and recent admission for YANIRA on CKD that improved with Elias catheter placement presenting for mild confusion and fatigue since discharge. Patient reportedly went to appt at [facility] where UA was obtained c/f UTI and patient was instructed to present to the ED. Patient otherwise denies fever, chills, chest pain, SOB, nausea, vomiting, diarrhea, constipation, or dysuria. In the ED. patient was afebrile but tachycardic to 102. Labs significant for WBC of 25.43 and UA indeterminate. RVP and CXR unremarkable. Patient admitted for AMS iso likely sepsis 2/2 UTI. (02 Oct 2024 21:44)    Hematology/Oncology called to see patient who follows with Dr Herring of [facility] for management of renal ca.    PAST MEDICAL & SURGICAL HISTORY:  Coronary artery disease      Hypertension      Hyperlipidemia      Anxiety      Glaucoma  bilat. " elevated pressure"- notes eye drops have been stopped for a year now      Herniated disc      Bilateral carpal tunnel syndrome      Neuropathy  fingers ,hands ,feet-bilateral      Kidney stone on right side      Type 2 diabetes mellitus      Stented coronary artery  2010 RIGHT Coronary Artery Stent      Metastatic urothelial carcinoma      Acute kidney injury superimposed on CKD      S/P shoulder surgery  1975- LEFT Shoulder      S/P hernia surgery        S/P angioplasty with stent  x 1       Tendon laceration  left hand       Metastatic urothelial carcinoma          FAMILY HISTORY:  Family history of Alzheimer's disease (Mother)  Mother -  Age 82    Family history of type 2 diabetes mellitus in father (Father)  Father - Age 80    FH: COPD (chronic obstructive pulmonary disease) (Father)  Father -  Age 80        Alochol: Denied  Smoking: Nonsmoker  Drug Use: Denied  Marital Status:         Allergies    No Known Allergies    Intolerances        MEDICATIONS  (STANDING):  apixaban 5 milliGRAM(s) Oral every 12 hours  aspirin  chewable 81 milliGRAM(s) Oral daily  atorvastatin 10 milliGRAM(s) Oral at bedtime  dextrose 5%. 1000 milliLiter(s) (100 mL/Hr) IV Continuous <Continuous>  dextrose 5%. 1000 milliLiter(s) (50 mL/Hr) IV Continuous <Continuous>  dextrose 50% Injectable 25 Gram(s) IV Push once  dextrose 50% Injectable 25 Gram(s) IV Push once  dextrose 50% Injectable 12.5 Gram(s) IV Push once  glucagon  Injectable 1 milliGRAM(s) IntraMuscular once  influenza  Vaccine (HIGH DOSE) 0.5 milliLiter(s) IntraMuscular once  insulin lispro (ADMELOG) corrective regimen sliding scale   SubCutaneous three times a day before meals  insulin lispro (ADMELOG) corrective regimen sliding scale   SubCutaneous at bedtime  metoprolol tartrate 25 milliGRAM(s) Oral two times a day  pantoprazole    Tablet 40 milliGRAM(s) Oral before breakfast  piperacillin/tazobactam IVPB.. 3.375 Gram(s) IV Intermittent every 8 hours    MEDICATIONS  (PRN):  dextrose Oral Gel 15 Gram(s) Oral once PRN Blood Glucose LESS THAN 70 milliGRAM(s)/deciliter        T(C): 36.7 (10-03-24 @ 11:58), Max: 37.4 (10-02-24 @ 15:40)  HR: 93 (10-03-24 @ 11:58) (89 - 102)  BP: 102/67 (10-03-24 @ 11:58) (97/64 - 111/74)  RR: 18 (10-03-24 @ 11:58) (18 - 20)  SpO2: 96% (10-03-24 @ 11:58) (95% - 100%)  Wt(kg): --    PE  Awake, alert  Anicteric, MMM  RRR  CTAB  Abd soft, NT, ND  No c/c                          7.5    22.14 )-----------( 461      ( 03 Oct 2024 07:23 )             24.6       10-03    130[L]  |  96  |  33[H]  ----------------------------<  110[H]  3.9   |  21[L]  |  1.58[H]    Ca    10.0      03 Oct 2024 07:21  Phos  2.9     10-03  Mg     1.7     10-03    TPro  6.8  /  Alb  2.6[L]  /  TBili  0.5  /  DBili  x   /  AST  38  /  ALT  42  /  AlkPhos  160[H]  10-03

## 2024-10-03 NOTE — PROGRESS NOTE ADULT - PROBLEM SELECTOR PLAN 1
On admission, patient was confused, and tachycardic with WBC count of 25. He was recently admitted for YANIRA on CKD, s/p Elias placement. On Zosyn for sepsis likely 2/2 UTI given mental status and recent Elias placement. UA neg. Hemodynamically stable, afebrile. No abdominal tenderness on exam.  - continue Zosyn   - f/u blood cultures, urine cultures   - f/u renal US given hx of stones/ hydronephrosis   - f/u CT abdomen and pelvis On admission, patient was confused, and tachycardic with WBC count of 25. He was recently admitted for YANIRA on CKD, s/p Eilas placement. On Zosyn for sepsis likely 2/2 UTI given mental status and recent Elias placement. UA neg. Hemodynamically stable, afebrile. No abdominal tenderness on exam.  - continue Zosyn   - f/u blood cultures, urine cultures   - f/u CT abdomen and pelvis On admission, patient was confused, and tachycardic with WBC count of 25. He was recently admitted for YANIRA on CKD, s/p Elias placement. On Zosyn for sepsis likely 2/2 UTI given mental status and recent Elias placement. UA neg. Hemodynamically stable, afebrile. No abdominal tenderness on exam.  - lactate normalized  - continue Zosyn   - f/u blood cultures, urine cultures   - f/u CT abdomen and pelvis  - Bp stable started home metoprolol 25 mg BID w/ hold parameters On admission, patient was confused, and tachycardic with WBC count of 25. He was recently admitted for YANIRA on CKD, s/p Elias placement. On Zosyn for sepsis likely 2/2 UTI given mental status and recent Elias placement. UA neg. Hemodynamically stable, afebrile. No abdominal tenderness on exam.  - lactate normalized  - continue Zosyn   - f/u blood cultures, urine cultures   - f/u CT abdomen and pelvis  - Bp stable started home metoprolol 25 mg BID w/ hold parameters  - metabolic encephalopathy now resolved AAOx4

## 2024-10-03 NOTE — PROGRESS NOTE ADULT - PROBLEM SELECTOR PLAN 3
Patient with metastatic urothelial cancer, follows with [facility]/b tumor invasion of L ureter and hydronephrosis. On chemotherapy, missed last session on Friday.   - f/u heme/onc consult Patient with metastatic urothelial cancer, follows with [facility] c/b tumor invasion of L ureter and hydronephrosis. On chemotherapy, missed last session on Friday.   - f/u heme/onc consulted

## 2024-10-03 NOTE — CONSULT NOTE ADULT - NS ATTEND AMEND GEN_ALL_CORE FT
Agree with above assessment and plan.   Improving with antibiotics. Creatinine at baseline. No plan for treatment inpatient. Follow up at [facility] [place] for further treatment.

## 2024-10-03 NOTE — PROGRESS NOTE ADULT - PROBLEM SELECTOR PLAN 2
Continue home medication Atorvastatin 20mg qhs Continue home medication Atorvastatin 10mg qhs, ASA 81 mg qd

## 2024-10-03 NOTE — PROGRESS NOTE ADULT - ASSESSMENT
69-year-old male w/ PMH of HTN, HLD, T2DM, CAD and metastatic [place]ial [place] [facility] [place]/b [place] invasion and hydronephrosis, and recent admission for YANIRA on CKD that improved with Elias catheter placement presenting for mild confusion and fatigue since discharge. In the ED. patient was afebrile but tachycardic to 102. Labs significant for WBC of 25.43 and UA indeterminate. Patient admitted for AMS iso likely sepsis 2/2 UTI.

## 2024-10-03 NOTE — CONSULT NOTE ADULT - ASSESSMENT
Metastatic urothelial cancer  --Previously on gem carbo x 6 through 12/2023 -> avelumab since 2/2024 then held since 6/18/24 due to CRI, anemia, dizziness. PET 7/2024 shows PD at multiple sites.  Started treatment with enfortumab vedotin LD 9/13/24.  --[place] with [place]. Herring at [facility] [place].  --No plan for treatment while admitted  --May resume home Eliquis 5mg BID for h/o DVT. 9/25 duplex was negative for DVT.    AMS  Leukocytosis  --2/2 UTI, on IV abx  --f/up blood and urine cultures, mentation improving    hx Hydronephrosis/ YANIRA  --Cr 1.59 on admission  --2/2 disease burden  --CT a/p 9/24/24: Similar size of dominant 7 cm left renal mass. Increased size of right hepatic lobe lesions. Increased and decreased size of left perinephric masses. Decreased size of left para-aortic lymph node. 0.4 cm distal right ureteral stone, with proximal hydroureter and perivesicular fat stranding.  --awaiting repeat CT a/p for evaluation    Anemia   --Multifactorial d/t malignancy, treatment, CKD  --Hgb 7.5-9.5 at baseline  --Transfuse for Hgb <7, 8 if symptomatic  --Pt with chronic anemia in setting of malignancy, will check folate and total iron    hx DVT  --c/w Eliquis    will follow,    Samaria Dangelo NP  Hematology/ Oncology  [facility]  [phone] (office)  [phone] (alt office)  Evenings and weekends please call MD on call or office

## 2024-10-03 NOTE — PROGRESS NOTE ADULT - PROBLEM SELECTOR PLAN 6
DVT Ppx: Eliquis 5mg bid  Diet: Carb controlled   Dispo: pending DVT Ppx: Eliquis 5mg bid  Diet: Carb controlled   Dispo: pending clinical improvement, PT consulted

## 2024-10-03 NOTE — PROGRESS NOTE ADULT - SUBJECTIVE AND OBJECTIVE BOX
Patient is a 69y old  Male who presents with a chief complaint of AMS (02 Oct 2024 21:44)      SUBJECTIVE / OVERNIGHT EVENTS:    ROS:  All other review of systems negative    Allergies    No Known Allergies    Intolerances        MEDICATIONS  (STANDING):  apixaban 5 milliGRAM(s) Oral every 12 hours  atorvastatin 20 milliGRAM(s) Oral at bedtime  dextrose 5%. 1000 milliLiter(s) (50 mL/Hr) IV Continuous <Continuous>  dextrose 5%. 1000 milliLiter(s) (100 mL/Hr) IV Continuous <Continuous>  dextrose 50% Injectable 12.5 Gram(s) IV Push once  dextrose 50% Injectable 25 Gram(s) IV Push once  dextrose 50% Injectable 25 Gram(s) IV Push once  glucagon  Injectable 1 milliGRAM(s) IntraMuscular once  influenza  Vaccine (HIGH DOSE) 0.5 milliLiter(s) IntraMuscular once  insulin lispro (ADMELOG) corrective regimen sliding scale   SubCutaneous three times a day before meals  insulin lispro (ADMELOG) corrective regimen sliding scale   SubCutaneous at bedtime  pantoprazole    Tablet 40 milliGRAM(s) Oral before breakfast  piperacillin/tazobactam IVPB.. 3.375 Gram(s) IV Intermittent every 8 hours    MEDICATIONS  (PRN):  dextrose Oral Gel 15 Gram(s) Oral once PRN Blood Glucose LESS THAN 70 milliGRAM(s)/deciliter      Vital Signs Last 24 Hrs  T(C): 36.7 (03 Oct 2024 04:56), Max: 37.4 (02 Oct 2024 15:40)  T(F): 98.1 (03 Oct 2024 04:56), Max: 99.4 (02 Oct 2024 15:40)  HR: 89 (03 Oct 2024 04:56) (89 - 102)  BP: 100/67 (03 Oct 2024 04:56) (97/64 - 111/74)  BP(mean): --  RR: 18 (03 Oct 2024 04:56) (18 - 20)  SpO2: 96% (03 Oct 2024 04:56) (95% - 100%)    Parameters below as of 03 Oct 2024 04:56  Patient On (Oxygen Delivery Method): room air      CAPILLARY BLOOD GLUCOSE      POCT Blood Glucose.: 132 mg/dL (03 Oct 2024 08:33)  POCT Blood Glucose.: 116 mg/dL (02 Oct 2024 23:16)    I&O's Summary    02 Oct 2024 07:01  -  03 Oct 2024 07:00  --------------------------------------------------------  IN: 100 mL / OUT: 200 mL / NET: -100 mL    03 Oct 2024 07:01  -  03 Oct 2024 10:13  --------------------------------------------------------  IN: 240 mL / OUT: 150 mL / NET: 90 mL        PHYSICAL EXAM:  GENERAL: NAD, well-developed  HEAD:  Atraumatic, Normocephalic  EYES: EOMI, PERRLA, conjunctiva and sclera clear  NECK: Supple, No JVD  CHEST/LUNG: Clear to auscultation bilaterally; No wheeze  HEART: Regular rate and rhythm; No murmurs, rubs, or gallops  ABDOMEN: Soft, Nontender, Nondistended; Bowel sounds present  EXTREMITIES:  2+ Peripheral Pulses, No clubbing, cyanosis, or edema  NEUROLOGY: AAOx3, non-focal  PSYCH: calm  SKIN: No rashes or lesions    LABS:                        7.5    22.14 )-----------( 461      ( 03 Oct 2024 07:23 )             24.6     10-03    130[L]  |  96  |  33[H]  ----------------------------<  110[H]  3.9   |  21[L]  |  1.58[H]    Ca    10.0      03 Oct 2024 07:21  Phos  2.9     10-03  Mg     1.7     10-03    TPro  6.8  /  Alb  2.6[L]  /  TBili  0.5  /  DBili  x   /  AST  38  /  ALT  42  /  AlkPhos  160[H]  10-03    PT/INR - ( 02 Oct 2024 16:31 )   PT: 17.8 sec;   INR: 1.57 ratio         PTT - ( 02 Oct 2024 16:31 )  PTT:32.0 sec      Urinalysis Basic - ( 03 Oct 2024 07:21 )    Color: x / Appearance: x / SG: x / pH: x  Gluc: 110 mg/dL / Ketone: x  / Bili: x / Urobili: x   Blood: x / Protein: x / Nitrite: x   Leuk Esterase: x / RBC: x / WBC x   Sq Epi: x / Non Sq Epi: x / Bacteria: x        RADIOLOGY & ADDITIONAL TESTS:    Imaging Personally Reviewed:    Consultant(s) Notes Reviewed:      Care Discussed with Consultants/Other Providers:    Case Discussed with Family:    Goals of Care: Patient is a 69 y old Male who presents with a chief complaint of AMS (02 Oct 2024 21:44)      SUBJECTIVE / OVERNIGHT EVENTS: No overnight events. Patient examined at bedside, has no acute complaints. Wife at bedside to provide further history. Wife states patient has had multiple admissions for elevated creatinine in the past, recently had Elias placed for worsening kidney function.     ROS:  All other review of systems negative    Allergies    No Known Allergies    Intolerances    MEDICATIONS  (STANDING):  apixaban 5 milliGRAM(s) Oral every 12 hours  atorvastatin 20 milliGRAM(s) Oral at bedtime  dextrose 5%. 1000 milliLiter(s) (50 mL/Hr) IV Continuous <Continuous>  dextrose 5%. 1000 milliLiter(s) (100 mL/Hr) IV Continuous <Continuous>  dextrose 50% Injectable 12.5 Gram(s) IV Push once  dextrose 50% Injectable 25 Gram(s) IV Push once  dextrose 50% Injectable 25 Gram(s) IV Push once  glucagon  Injectable 1 milliGRAM(s) IntraMuscular once  influenza  Vaccine (HIGH DOSE) 0.5 milliLiter(s) IntraMuscular once  insulin lispro (ADMELOG) corrective regimen sliding scale   SubCutaneous three times a day before meals  insulin lispro (ADMELOG) corrective regimen sliding scale   SubCutaneous at bedtime  pantoprazole    Tablet 40 milliGRAM(s) Oral before breakfast  piperacillin/tazobactam IVPB.. 3.375 Gram(s) IV Intermittent every 8 hours    MEDICATIONS  (PRN):  dextrose Oral Gel 15 Gram(s) Oral once PRN Blood Glucose LESS THAN 70 milliGRAM(s)/deciliter      Vital Signs Last 24 Hrs  T(C): 36.7 (03 Oct 2024 04:56), Max: 37.4 (02 Oct 2024 15:40)  T(F): 98.1 (03 Oct 2024 04:56), Max: 99.4 (02 Oct 2024 15:40)  HR: 89 (03 Oct 2024 04:56) (89 - 102)  BP: 100/67 (03 Oct 2024 04:56) (97/64 - 111/74)  BP(mean): --  RR: 18 (03 Oct 2024 04:56) (18 - 20)  SpO2: 96% (03 Oct 2024 04:56) (95% - 100%)    Parameters below as of 03 Oct 2024 04:56  Patient On (Oxygen Delivery Method): room air      CAPILLARY BLOOD GLUCOSE      POCT Blood Glucose.: 132 mg/dL (03 Oct 2024 08:33)  POCT Blood Glucose.: 116 mg/dL (02 Oct 2024 23:16)    I&O's Summary    02 Oct 2024 07:01  -  03 Oct 2024 07:00  --------------------------------------------------------  IN: 100 mL / OUT: 200 mL / NET: -100 mL    03 Oct 2024 07:01  -  03 Oct 2024 10:13  --------------------------------------------------------  IN: 240 mL / OUT: 150 mL / NET: 90 mL        PHYSICAL EXAM:  GENERAL: NAD, well-developed  HEAD:  Atraumatic, Normocephalic  EYES: EOMI, PERRLA, conjunctiva and sclera clear  NECK: Supple, No JVD  CHEST/LUNG: Clear to auscultation bilaterally; No wheeze  HEART: Regular rate and rhythm; No murmurs, rubs, or gallops  ABDOMEN: Soft, Nontender, Nondistended; Bowel sounds present  EXTREMITIES:  2+ Peripheral Pulses, No clubbing, cyanosis, or edema  NEUROLOGY: AAOx3, non-focal  PSYCH: calm  SKIN: No rashes or lesions    LABS:                        7.5    22.14 )-----------( 461      ( 03 Oct 2024 07:23 )             24.6     10-03    130[L]  |  96  |  33[H]  ----------------------------<  110[H]  3.9   |  21[L]  |  1.58[H]    Ca    10.0      03 Oct 2024 07:21  Phos  2.9     10-03  Mg     1.7     10-03    TPro  6.8  /  Alb  2.6[L]  /  TBili  0.5  /  DBili  x   /  AST  38  /  ALT  42  /  AlkPhos  160[H]  10-03    PT/INR - ( 02 Oct 2024 16:31 )   PT: 17.8 sec;   INR: 1.57 ratio         PTT - ( 02 Oct 2024 16:31 )  PTT:32.0 sec      Urinalysis Basic - ( 03 Oct 2024 07:21 )    Color: x / Appearance: x / SG: x / pH: x  Gluc: 110 mg/dL / Ketone: x  / Bili: x / Urobili: x   Blood: x / Protein: x / Nitrite: x   Leuk Esterase: x / RBC: x / WBC x   Sq Epi: x / Non Sq Epi: x / Bacteria: x        RADIOLOGY & ADDITIONAL TESTS:    Imaging Personally Reviewed:    Consultant(s) Notes Reviewed:      Care Discussed with Consultants/Other Providers:    Case Discussed with Family:    Goals of Care: Patient is a 69 y old Male who presents with a chief complaint of AMS (02 Oct 2024 21:44)      SUBJECTIVE / OVERNIGHT EVENTS: No overnight events. Patient examined at bedside, has no acute complaints. Wife at bedside to provide further history. Wife states patient has had multiple admissions for elevated creatinine in the past. He had his Elias removed recently.     ROS:  All other review of systems negative    Allergies    No Known Allergies    Intolerances    MEDICATIONS  (STANDING):  apixaban 5 milliGRAM(s) Oral every 12 hours  atorvastatin 20 milliGRAM(s) Oral at bedtime  dextrose 5%. 1000 milliLiter(s) (50 mL/Hr) IV Continuous <Continuous>  dextrose 5%. 1000 milliLiter(s) (100 mL/Hr) IV Continuous <Continuous>  dextrose 50% Injectable 12.5 Gram(s) IV Push once  dextrose 50% Injectable 25 Gram(s) IV Push once  dextrose 50% Injectable 25 Gram(s) IV Push once  glucagon  Injectable 1 milliGRAM(s) IntraMuscular once  influenza  Vaccine (HIGH DOSE) 0.5 milliLiter(s) IntraMuscular once  insulin lispro (ADMELOG) corrective regimen sliding scale   SubCutaneous three times a day before meals  insulin lispro (ADMELOG) corrective regimen sliding scale   SubCutaneous at bedtime  pantoprazole    Tablet 40 milliGRAM(s) Oral before breakfast  piperacillin/tazobactam IVPB.. 3.375 Gram(s) IV Intermittent every 8 hours    MEDICATIONS  (PRN):  dextrose Oral Gel 15 Gram(s) Oral once PRN Blood Glucose LESS THAN 70 milliGRAM(s)/deciliter      Vital Signs Last 24 Hrs  T(C): 36.7 (03 Oct 2024 04:56), Max: 37.4 (02 Oct 2024 15:40)  T(F): 98.1 (03 Oct 2024 04:56), Max: 99.4 (02 Oct 2024 15:40)  HR: 89 (03 Oct 2024 04:56) (89 - 102)  BP: 100/67 (03 Oct 2024 04:56) (97/64 - 111/74)  BP(mean): --  RR: 18 (03 Oct 2024 04:56) (18 - 20)  SpO2: 96% (03 Oct 2024 04:56) (95% - 100%)    Parameters below as of 03 Oct 2024 04:56  Patient On (Oxygen Delivery Method): room air      CAPILLARY BLOOD GLUCOSE      POCT Blood Glucose.: 132 mg/dL (03 Oct 2024 08:33)  POCT Blood Glucose.: 116 mg/dL (02 Oct 2024 23:16)    I&O's Summary    02 Oct 2024 07:01  -  03 Oct 2024 07:00  --------------------------------------------------------  IN: 100 mL / OUT: 200 mL / NET: -100 mL    03 Oct 2024 07:01  -  03 Oct 2024 10:13  --------------------------------------------------------  IN: 240 mL / OUT: 150 mL / NET: 90 mL        PHYSICAL EXAM:  GENERAL: NAD, well-developed  HEAD:  Atraumatic, Normocephalic  EYES: EOMI, PERRLA, conjunctiva and sclera clear  NECK: Supple, No JVD  CHEST/LUNG: Clear to auscultation bilaterally; No wheeze  HEART: Regular rate and rhythm; No murmurs, rubs, or gallops  ABDOMEN: Soft, Nontender, Nondistended; Bowel sounds present  EXTREMITIES:  2+ Peripheral Pulses, No clubbing, cyanosis, or edema  NEUROLOGY: AAOx3, non-focal  PSYCH: calm  SKIN: No rashes or lesions    LABS:                        7.5    22.14 )-----------( 461      ( 03 Oct 2024 07:23 )             24.6     10-03    130[L]  |  96  |  33[H]  ----------------------------<  110[H]  3.9   |  21[L]  |  1.58[H]    Ca    10.0      03 Oct 2024 07:21  Phos  2.9     10-03  Mg     1.7     10-03    TPro  6.8  /  Alb  2.6[L]  /  TBili  0.5  /  DBili  x   /  AST  38  /  ALT  42  /  AlkPhos  160[H]  10-03    PT/INR - ( 02 Oct 2024 16:31 )   PT: 17.8 sec;   INR: 1.57 ratio         PTT - ( 02 Oct 2024 16:31 )  PTT:32.0 sec      Urinalysis Basic - ( 03 Oct 2024 07:21 )    Color: x / Appearance: x / SG: x / pH: x  Gluc: 110 mg/dL / Ketone: x  / Bili: x / Urobili: x   Blood: x / Protein: x / Nitrite: x   Leuk Esterase: x / RBC: x / WBC x   Sq Epi: x / Non Sq Epi: x / Bacteria: x        RADIOLOGY & ADDITIONAL TESTS:    Imaging Personally Reviewed:    Consultant(s) Notes Reviewed:      Care Discussed with Consultants/Other Providers:    Case Discussed with Family:    Goals of Care:

## 2024-10-03 NOTE — PATIENT PROFILE ADULT - FALL HARM RISK - HARM RISK INTERVENTIONS

## 2024-10-04 LAB
ANION GAP SERPL CALC-SCNC: 13 MMOL/L — SIGNIFICANT CHANGE UP (ref 5–17)
BLD GP AB SCN SERPL QL: NEGATIVE — SIGNIFICANT CHANGE UP
BUN SERPL-MCNC: 27 MG/DL — HIGH (ref 7–23)
CALCIUM SERPL-MCNC: 10 MG/DL — SIGNIFICANT CHANGE UP (ref 8.4–10.5)
CHLORIDE SERPL-SCNC: 98 MMOL/L — SIGNIFICANT CHANGE UP (ref 96–108)
CO2 SERPL-SCNC: 21 MMOL/L — LOW (ref 22–31)
CREAT SERPL-MCNC: 1.87 MG/DL — HIGH (ref 0.5–1.3)
CULTURE RESULTS: NO GROWTH — SIGNIFICANT CHANGE UP
EGFR: 38 ML/MIN/1.73M2 — LOW
FOLATE SERPL-MCNC: 4.4 NG/ML — LOW
GLUCOSE BLDC GLUCOMTR-MCNC: 126 MG/DL — HIGH (ref 70–99)
GLUCOSE BLDC GLUCOMTR-MCNC: 133 MG/DL — HIGH (ref 70–99)
GLUCOSE BLDC GLUCOMTR-MCNC: 141 MG/DL — HIGH (ref 70–99)
GLUCOSE BLDC GLUCOMTR-MCNC: 195 MG/DL — HIGH (ref 70–99)
GLUCOSE SERPL-MCNC: 107 MG/DL — HIGH (ref 70–99)
HCT VFR BLD CALC: 23.4 % — LOW (ref 39–50)
HCT VFR BLD CALC: 24.6 % — LOW (ref 39–50)
HGB BLD-MCNC: 7 G/DL — CRITICAL LOW (ref 13–17)
HGB BLD-MCNC: 7.6 G/DL — LOW (ref 13–17)
IRON SATN MFR SERPL: 12 % — LOW (ref 16–55)
IRON SATN MFR SERPL: 21 UG/DL — LOW (ref 45–165)
MCHC RBC-ENTMCNC: 23.9 PG — LOW (ref 27–34)
MCHC RBC-ENTMCNC: 24.3 PG — LOW (ref 27–34)
MCHC RBC-ENTMCNC: 29.9 GM/DL — LOW (ref 32–36)
MCHC RBC-ENTMCNC: 30.9 GM/DL — LOW (ref 32–36)
MCV RBC AUTO: 78.6 FL — LOW (ref 80–100)
MCV RBC AUTO: 79.9 FL — LOW (ref 80–100)
NRBC # BLD: 0 /100 WBCS — SIGNIFICANT CHANGE UP (ref 0–0)
NRBC # BLD: 0 /100 WBCS — SIGNIFICANT CHANGE UP (ref 0–0)
PLATELET # BLD AUTO: 418 K/UL — HIGH (ref 150–400)
PLATELET # BLD AUTO: 441 K/UL — HIGH (ref 150–400)
POTASSIUM SERPL-MCNC: 4.1 MMOL/L — SIGNIFICANT CHANGE UP (ref 3.5–5.3)
POTASSIUM SERPL-SCNC: 4.1 MMOL/L — SIGNIFICANT CHANGE UP (ref 3.5–5.3)
RBC # BLD: 2.93 M/UL — LOW (ref 4.2–5.8)
RBC # BLD: 3.13 M/UL — LOW (ref 4.2–5.8)
RBC # FLD: 21.2 % — HIGH (ref 10.3–14.5)
RBC # FLD: 21.2 % — HIGH (ref 10.3–14.5)
RH IG SCN BLD-IMP: POSITIVE — SIGNIFICANT CHANGE UP
SODIUM SERPL-SCNC: 132 MMOL/L — LOW (ref 135–145)
SPECIMEN SOURCE: SIGNIFICANT CHANGE UP
TIBC SERPL-MCNC: 173 UG/DL — LOW (ref 220–430)
UIBC SERPL-MCNC: 152 UG/DL — SIGNIFICANT CHANGE UP (ref 110–370)
WBC # BLD: 23.47 K/UL — HIGH (ref 3.8–10.5)
WBC # BLD: 26.7 K/UL — HIGH (ref 3.8–10.5)
WBC # FLD AUTO: 23.47 K/UL — HIGH (ref 3.8–10.5)
WBC # FLD AUTO: 26.7 K/UL — HIGH (ref 3.8–10.5)

## 2024-10-04 PROCEDURE — 99233 SBSQ HOSP IP/OBS HIGH 50: CPT

## 2024-10-04 RX ORDER — CEFTRIAXONE SODIUM 1 G
1000 VIAL (EA) INJECTION EVERY 24 HOURS
Refills: 0 | Status: DISCONTINUED | OUTPATIENT
Start: 2024-10-04 | End: 2024-10-10

## 2024-10-04 RX ORDER — ACETAMINOPHEN 325 MG
650 TABLET ORAL ONCE
Refills: 0 | Status: COMPLETED | OUTPATIENT
Start: 2024-10-04 | End: 2024-10-04

## 2024-10-04 RX ADMIN — PIPERACILLIN SODIUM AND TAZOBACTAM SODIUM 25 GRAM(S): 12; 1.5 INJECTION, POWDER, LYOPHILIZED, FOR SOLUTION INTRAVENOUS at 10:55

## 2024-10-04 RX ADMIN — PIPERACILLIN SODIUM AND TAZOBACTAM SODIUM 25 GRAM(S): 12; 1.5 INJECTION, POWDER, LYOPHILIZED, FOR SOLUTION INTRAVENOUS at 02:19

## 2024-10-04 RX ADMIN — PANTOPRAZOLE SODIUM 40 MILLIGRAM(S): 40 TABLET, DELAYED RELEASE ORAL at 05:48

## 2024-10-04 RX ADMIN — Medication 650 MILLIGRAM(S): at 05:49

## 2024-10-04 RX ADMIN — Medication 81 MILLIGRAM(S): at 13:29

## 2024-10-04 RX ADMIN — APIXABAN 5 MILLIGRAM(S): 5 TABLET, FILM COATED ORAL at 05:48

## 2024-10-04 RX ADMIN — ATORVASTATIN CALCIUM 10 MILLIGRAM(S): 10 TABLET, FILM COATED ORAL at 21:52

## 2024-10-04 RX ADMIN — Medication 25 MILLIGRAM(S): at 05:48

## 2024-10-04 RX ADMIN — APIXABAN 5 MILLIGRAM(S): 5 TABLET, FILM COATED ORAL at 17:41

## 2024-10-04 RX ADMIN — Medication 650 MILLIGRAM(S): at 06:49

## 2024-10-04 RX ADMIN — Medication 1: at 12:12

## 2024-10-04 RX ADMIN — Medication 100 MILLIGRAM(S): at 13:29

## 2024-10-04 NOTE — PROGRESS NOTE ADULT - SUBJECTIVE AND OBJECTIVE BOX
Patient is a 69y old  Male who presents with a chief complaint of AMS (04 Oct 2024 10:54)    Patient seen and examined  Appears comfortable, no new complaints offered  Family at bedside    MEDICATIONS  (STANDING):  apixaban 5 milliGRAM(s) Oral every 12 hours  aspirin  chewable 81 milliGRAM(s) Oral daily  atorvastatin 10 milliGRAM(s) Oral at bedtime  cefTRIAXone   IVPB 1000 milliGRAM(s) IV Intermittent every 24 hours  dextrose 5%. 1000 milliLiter(s) (50 mL/Hr) IV Continuous <Continuous>  dextrose 5%. 1000 milliLiter(s) (100 mL/Hr) IV Continuous <Continuous>  dextrose 50% Injectable 12.5 Gram(s) IV Push once  dextrose 50% Injectable 25 Gram(s) IV Push once  dextrose 50% Injectable 25 Gram(s) IV Push once  glucagon  Injectable 1 milliGRAM(s) IntraMuscular once  influenza  Vaccine (HIGH DOSE) 0.5 milliLiter(s) IntraMuscular once  insulin lispro (ADMELOG) corrective regimen sliding scale   SubCutaneous three times a day before meals  insulin lispro (ADMELOG) corrective regimen sliding scale   SubCutaneous at bedtime  metoprolol tartrate 25 milliGRAM(s) Oral two times a day  pantoprazole    Tablet 40 milliGRAM(s) Oral before breakfast    MEDICATIONS  (PRN):  dextrose Oral Gel 15 Gram(s) Oral once PRN Blood Glucose LESS THAN 70 milliGRAM(s)/deciliter      Vital Signs Last 24 Hrs  T(C): 36.9 (04 Oct 2024 11:12), Max: 37.6 (04 Oct 2024 04:24)  T(F): 98.4 (04 Oct 2024 11:12), Max: 99.7 (04 Oct 2024 04:24)  HR: 89 (04 Oct 2024 11:12) (71 - 95)  BP: 122/69 (04 Oct 2024 11:12) (102/65 - 122/69)  BP(mean): --  RR: 18 (04 Oct 2024 11:12) (18 - 18)  SpO2: 98% (04 Oct 2024 11:12) (96% - 98%)    Parameters below as of 04 Oct 2024 11:12  Patient On (Oxygen Delivery Method): room air      PE  Awake, alert  Anicteric, MMM  RRR  CTAB  Abd soft, NT, ND  No c/c                        7.0    23.47 )-----------( 418      ( 04 Oct 2024 07:17 )             23.4       10-04    132[L]  |  98  |  27[H]  ----------------------------<  107[H]  4.1   |  21[L]  |  1.87[H]    Ca    10.0      04 Oct 2024 07:16  Phos  2.9     10-03  Mg     1.7     10-03    TPro  6.8  /  Alb  2.6[L]  /  TBili  0.5  /  DBili  x   /  AST  38  /  ALT  42  /  AlkPhos  160[H]  10-03      ACC: [id number] EXAM:  CT ABDOMEN AND PELVIS   ORDERED BY: BERNARDINO CHRISTIE     PROCEDURE DATE:  10/03/2024          INTERPRETATION:  CLINICAL INFORMATION: Metastatic urothelial cancer. UTI,   acute kidney injury.    COMPARISON: CT abdomen pelvis 9/24/2024.    CONTRAST/COMPLICATIONS:  IV Contrast: None  Oral Contrast: None  Complications: None    PROCEDURE:  CT of the Abdomen and Pelvis was performed.  Sagittal and coronal reformats were performed.    FINDINGS:  LOWER CHEST: Within normal limits.    LIVER: Right hepatic lobe lesions, grossly similar prior exam.  BILE DUCTS: Normal caliber.  GALLBLADDER: Within normal limits.  SPLEEN: Within normal limits.  PANCREAS: Within normal limits.  ADRENALS: Within normal limits.  KIDNEYS/URETERS: Redemonstrated ill-defined left lower pole renal mass   with tumor involvement of the distal left renal vein and associated   moderate hydronephrosis of the left upper pole, similar to the prior   exam. Bilateral renal cysts.    BLADDER: Small droplets of air in the bladder wall with a small   outpouching of air at the anterior bladder wall.  REPRODUCTIVE ORGANS: Prostate is enlarged.    BOWEL: No bowel obstruction. Colonic diverticulosis. Appendix is normal.  PERITONEUM/RETROPERITONEUM: Redemonstrated left perinephric and   perisplenic soft tissue implants. Interval increase in size of dominant   left perinephric lesion measuring 5.5 cm, previously 4.5 cm.  VESSELS: Within normal limits.  LYMPH NODES: Stable retroperitoneal lymphadenopathy.  ABDOMINAL WALL: Bilateral fat-containing inguinal hernias. Question prior   right inguinal hernia repair.  BONES: Degenerative changes. L3-L4 disc spacer.    IMPRESSION:  Emphysematous cystitis.    Left renal mass with associated hydronephrosis similar to prior exam.    Interval increase in size of one of the perinephric soft tissue implants.    --- End of Report ---

## 2024-10-04 NOTE — DIETITIAN INITIAL EVALUATION ADULT - PERTINENT MEDS FT
MEDICATIONS  (STANDING):  apixaban 5 milliGRAM(s) Oral every 12 hours  aspirin  chewable 81 milliGRAM(s) Oral daily  atorvastatin 10 milliGRAM(s) Oral at bedtime  cefTRIAXone   IVPB 1000 milliGRAM(s) IV Intermittent every 24 hours  dextrose 5%. 1000 milliLiter(s) (100 mL/Hr) IV Continuous <Continuous>  dextrose 5%. 1000 milliLiter(s) (50 mL/Hr) IV Continuous <Continuous>  dextrose 50% Injectable 25 Gram(s) IV Push once  dextrose 50% Injectable 12.5 Gram(s) IV Push once  dextrose 50% Injectable 25 Gram(s) IV Push once  glucagon  Injectable 1 milliGRAM(s) IntraMuscular once  influenza  Vaccine (HIGH DOSE) 0.5 milliLiter(s) IntraMuscular once  insulin lispro (ADMELOG) corrective regimen sliding scale   SubCutaneous at bedtime  insulin lispro (ADMELOG) corrective regimen sliding scale   SubCutaneous three times a day before meals  metoprolol tartrate 25 milliGRAM(s) Oral two times a day  pantoprazole    Tablet 40 milliGRAM(s) Oral before breakfast    MEDICATIONS  (PRN):  dextrose Oral Gel 15 Gram(s) Oral once PRN Blood Glucose LESS THAN 70 milliGRAM(s)/deciliter

## 2024-10-04 NOTE — CONSULT NOTE ADULT - SUBJECTIVE AND OBJECTIVE BOX
UROLOGY CONSULT NOTE    HPI:  This is a 69y old Male with PMHx of T2DM, HTN, nephrolithiasis, and left kidney/urothelial cancer and possible metastases to the splenic hilum, liver, invasion to left renal vein and retroperitoneal lymphadenopathy, with left sided mild-moderate hydronephrosis for which he had a stent placed but was unable to be exchanged, who was admitted for altered mental status and lethargy.  He is being treated as a UTI.   Pt was previously on gem carbo x 6 through 12/2023 -> avelumab since 2/2024 but stopped, currently on enfortumab vedotin LD 9/13/24 - no treatment while inpatient per oncology recs.  Patient is not a good historian - unable to state why he is in the hospital.  Of note, he was evaluated by urology on his most recent admission and at the time had a nolen catheter placed for YANIRA.  CT also showed a right distal stone and he had stable left hydronephrosis.  Cr eventually improved at the time and the patient had his nolen catheter removed some time before this admission.        PAST MEDICAL HISTORY    Coronary artery disease    Hypertension    Hyperlipidemia    Asthma    Diabetes mellitus    Anxiety    Glaucoma    Herniated disc    Bilateral carpal tunnel syndrome    Neuropathy    Kidney stone on right side    Type 2 diabetes mellitus    Stented coronary artery    Metastatic urothelial carcinoma    Acute kidney injury superimposed on CKD        PAST SURGICAL HISTORY    S/P shoulder surgery    S/P hernia surgery    S/P angioplasty with stent    Tendon laceration    H/O colonoscopy    Metastatic urothelial carcinoma        FAMILY HISTORY    Family history of Alzheimer's disease (Mother)    Family history of type 2 diabetes mellitus in father (Father)    FH: COPD (chronic obstructive pulmonary disease) (Father)        SOCIAL HISTORY    noncontributory      HOME MEDICATIONS    atorvastatin 20 mg oral tablet: 1 tab(s) orally once a day- IN AM (02 Oct 2024 21:52)  Eliquis 5 mg oral tablet: 1 tab(s) orally 2 times a day (02 Oct 2024 21:52)  magnesium oxide 400 mg oral capsule: 1 cap(s) orally once a day (02 Oct 2024 21:52)  omeprazole 40 mg oral delayed release capsule: 1 cap(s) orally once a day (02 Oct 2024 21:52)      DRUG ALLERGIES    No Known Allergies      REVIEW OF SYSTEMS: Pertinent positives and negatives as stated in HPI, otherwise negative      VITAL SIGNS    T(F): 98.4, Max: 99.7 (10-04-24 @ 04:24)  HR: 89  BP: 115/79  RR: 18  SpO2: 98%    I's & O's      03 Oct 2024 07:01  -  04 Oct 2024 07:00  --------------------------------------------------------  IN: 1310 mL / OUT: 1200 mL / NET: 110 mL    04 Oct 2024 07:01  -  04 Oct 2024 13:27  --------------------------------------------------------  IN: 480 mL / OUT: 0 mL / NET: 480 mL        PHYSICAL EXAM    Gen: NAD  Abd: Soft, NT/ND  Back: no CVAT bilaterally  Ext: No edema present b/l      LABS:                        7.0    23.47 )-----------( 418               23.4     132  |  98  |  27  ----------------------------<  107  4.1   |  21  |  1.87    Ca    10.0  Phos  2.9  Mg     1.7    TPro  6.8  /  Alb  2.6  /  TBili  0.5  /  DBili  x   /  AST  38  /  ALT  42  /  AlkPhos  160    PT: 17.8 sec;   INR: 1.57 ratio  PTT:32.0 sec      Urinalysis Basic:    Color: x / Appearance: x / SG: x / pH: x  Gluc: 107 mg/dL / Ketone: x  / Bili: x / Urobili: x   Blood: x / Protein: x / Nitrite: x   Leuk Esterase: x / RBC: x / WBC x   Sq Epi: x / Non Sq Epi: x / Bacteria: x      Urine culture: no growth    Blood culture: no growth      IMAGING:    CT: Emphysematous cystitis.    Left renal mass with associated hydronephrosis similar to prior exam.    Interval increase in size of one of the perinephric soft tissue implants.     UROLOGY CONSULT NOTE    HPI:  This is a 69y old Male with PMHx of T2DM, HTN, nephrolithiasis, and left kidney/urothelial cancer and possible metastases to the splenic hilum, liver, invasion to left renal vein and retroperitoneal lymphadenopathy, with left sided mild-moderate hydronephrosis for which he had a stent placed but was unable to be exchanged, who was admitted for altered mental status and lethargy.  He is being treated for a UTI.   Pt was previously on gem carbo x 6 through 12/2023 -> avelumab since 2/2024 but stopped, currently on enfortumab vedotin LD 9/13/24 - no treatment while inpatient per oncology recs.  Patient is not a good historian - unable to state why he is in the hospital.  Of note, he was evaluated by urology on his most recent admission and at the time had a nolen catheter placed for YANIRA.  CT also showed a right distal stone and he had stable left hydronephrosis.  Cr eventually improved at the time and the patient had his nolen catheter removed some time before this admission.        PAST MEDICAL HISTORY    Coronary artery disease    Hypertension    Hyperlipidemia    Asthma    Diabetes mellitus    Anxiety    Glaucoma    Herniated disc    Bilateral carpal tunnel syndrome    Neuropathy    Kidney stone on right side    Type 2 diabetes mellitus    Stented coronary artery    Metastatic urothelial carcinoma    Acute kidney injury superimposed on CKD        PAST SURGICAL HISTORY    S/P shoulder surgery    S/P hernia surgery    S/P angioplasty with stent    Tendon laceration    H/O colonoscopy    Metastatic urothelial carcinoma        FAMILY HISTORY    Family history of Alzheimer's disease (Mother)    Family history of type 2 diabetes mellitus in father (Father)    FH: COPD (chronic obstructive pulmonary disease) (Father)        SOCIAL HISTORY    noncontributory      HOME MEDICATIONS    atorvastatin 20 mg oral tablet: 1 tab(s) orally once a day- IN AM (02 Oct 2024 21:52)  Eliquis 5 mg oral tablet: 1 tab(s) orally 2 times a day (02 Oct 2024 21:52)  magnesium oxide 400 mg oral capsule: 1 cap(s) orally once a day (02 Oct 2024 21:52)  omeprazole 40 mg oral delayed release capsule: 1 cap(s) orally once a day (02 Oct 2024 21:52)      DRUG ALLERGIES    No Known Allergies      REVIEW OF SYSTEMS: Pertinent positives and negatives as stated in HPI, otherwise negative      VITAL SIGNS    T(F): 98.4, Max: 99.7 (10-04-24 @ 04:24)  HR: 89  BP: 115/79  RR: 18  SpO2: 98%    I's & O's      03 Oct 2024 07:01  -  04 Oct 2024 07:00  --------------------------------------------------------  IN: 1310 mL / OUT: 1200 mL / NET: 110 mL    04 Oct 2024 07:01  -  04 Oct 2024 13:27  --------------------------------------------------------  IN: 480 mL / OUT: 0 mL / NET: 480 mL        PHYSICAL EXAM    Gen: NAD  Abd: Soft, NT/ND  Back: no CVAT bilaterally  Ext: No edema present b/l      LABS:                        7.0    23.47 )-----------( 418               23.4     132  |  98  |  27  ----------------------------<  107  4.1   |  21  |  1.87    Ca    10.0  Phos  2.9  Mg     1.7    TPro  6.8  /  Alb  2.6  /  TBili  0.5  /  DBili  x   /  AST  38  /  ALT  42  /  AlkPhos  160    PT: 17.8 sec;   INR: 1.57 ratio  PTT:32.0 sec      Urinalysis Basic:    Color: x / Appearance: x / SG: x / pH: x  Gluc: 107 mg/dL / Ketone: x  / Bili: x / Urobili: x   Blood: x / Protein: x / Nitrite: x   Leuk Esterase: x / RBC: x / WBC x   Sq Epi: x / Non Sq Epi: x / Bacteria: x      Urine culture: no growth    Blood culture: no growth      IMAGING:    CT: Emphysematous cystitis.    Left renal mass with associated hydronephrosis similar to prior exam.    Interval increase in size of one of the perinephric soft tissue implants.

## 2024-10-04 NOTE — PHYSICAL THERAPY INITIAL EVALUATION ADULT - PERTINENT HX OF CURRENT PROBLEM, REHAB EVAL
Mr. Derek Vila is a 69-year-old male w/ PMH of HTN, HLD, T2DM, CAD and metastatic urothelial CA (follows with [facility] [place]) c/b tumor invasion and hydronephrosis, and recent admission for YANIRA on CKD that improved with Elias catheter placement presenting for mild confusion and fatigue since discharge. Patient reportedly went to appt at [facility] [place] where UA was obtained c/f UTI and patient was instructed to present to the ED. Patient otherwise denies fever, chills, chest pain, SOB, nausea, vomiting, diarrhea, constipation, or dysuria. In the ED. patient was afebrile but tachycardic to 102. Labs significant for WBC of 25.43 and UA indeterminate. RVP and CXR unremarkable. Patient admitted for AMS iso likely sepsis 2/2 UTI.    XRAY Chest: Clear Lungs     CT Abdomen/Pelvis: Emphysematous cystitis. Left renal mass with associated hydronephrosis similar to prior exam. Interval increase in size of one of the perinephric soft tissue implants.

## 2024-10-04 NOTE — CONSULT NOTE ADULT - ASSESSMENT
Patient is a 69 year old male with PMH of HTN, HLD, T2DM, CAD and metastatic urothelial cancer ([place]s with [facility] [place]) c/b tumor invasion and hydronephrosis, and recent admission for YANIRA on CKD that improved with Nolen catheter placement presenting for AMS and leukocytosis noted during [facility] app[place] Tuesday. Wife reported nolen was removed during that appt. Patient in the ER with tachycardia, leukocytosis ~25k with no fever and was started on empiric zosyn.     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  H/o metastatic urothelial cancer c/b tumor invasion of L ureter and hydronephrosis on chemotherapy   - UA with no significant pyuria -only 6 WBC with trace LE, negative nitrites, no bacteria   - CTAP with emphysematous cystitis; L renal mass with associated hydronephrosis with no change; interval increase in size of one of the perinephric soft tissue implants   - Ucx negative   - Bcx NGTD x2   - prior culture reviewed-no positive cultures noted   - remains afebrile, WBC downtrended/stable, mental status at baseline now     Recommendations:   Urology evaluation   Discontinued zosyn   Started on ceftriaxone 1g IV Q24h  If continues to improve, can look at transition to cefpodoxime 200mg PO Q12h to complete 14 days on 10/15  Monitor temps/CBC, Cr   Continue rest of care per primary team     Over the weekend Dr. Michael Clements will be covering for our group. If you have any questions, concerns or new micro data, please reach out to them at [phone].     D/w patient and wife at bedside; Dr. Yimi Brooke M.D.  [facility], Division of Infectious Diseases  [phone]  After 5pm on weekdays and all day on weekends - please call [phone]  Available on [employer] TEAMS

## 2024-10-04 NOTE — PROGRESS NOTE ADULT - ASSESSMENT
Metastatic urothelial cancer  --Previously on gem carbo x 6 through 12/2023 -> avelumab since 2/2024 then held since 6/18/24 due to CRI, anemia, dizziness. PET 7/2024 shows PD at multiple sites.  Started treatment with enfortumab vedotin LD 9/13/24.  --Follows [facility]. Her[facility].  --No plan for treatment while admitted    AMS (resolved)  Leukocytosis  --2/2 UTI, on IV abx  -- blood and urine cultures NTD    hx Hydronephrosis/ YANIRA  --Cr 1.59 on admission, Elias removed.  --2/2 disease burden  --CT a/p 9/24/24: Similar size of dominant 7 cm left renal mass. Increased size of right hepatic lobe lesions. Increased and decreased size of left perinephric masses. Decreased size of left para-aortic lymph node. 0.4 cm distal right ureteral stone, with proximal hydroureter and perivesicular fat stranding.  --repeat CT a/p w/ Emphysematous cystitis. Left renal mass with associated hydronephrosis similar to prior exam. Interval increase in size of one of the perinephric soft tissue implant    Anemia   --Multifactorial d/t malignancy, treatment, CKD  --Hgb 7.5-9.5 at baseline  --Transfuse for Hgb <7, 8 if symptomatic  - checking folate and total iron, will supplement as needed    hx DVT  --c/w Eliquis    will follow,    Samaria Daneglo NP  Hematology/ Oncology  [facility]  [phone] (office)  [phone] (alt office)  Evenings and weekends please call MD on call or office

## 2024-10-04 NOTE — DIETITIAN INITIAL EVALUATION ADULT - OTHER CALCULATIONS
Defer fluid needs to team. Calculations accounting for factors of metastatic urothelial cancer and malnutrition

## 2024-10-04 NOTE — PHYSICAL THERAPY INITIAL EVALUATION ADULT - ADDITIONAL COMMENTS
Pt lives in house with 2 steps to enter and 2 flights inside., Pt performed ADL/IADLs independently. Ambulates with cane when leaving home. Owns DME: Rolling walker and cane

## 2024-10-04 NOTE — DIETITIAN INITIAL EVALUATION ADULT - PERSON TAUGHT/METHOD
adequate caloric/protein intake w/ food sources reviewed, role of oral nutrition supplements, food preferences, all questions were answered/verbal instruction/patient instructed

## 2024-10-04 NOTE — PROGRESS NOTE ADULT - PROBLEM SELECTOR PLAN 3
Patient with metastatic urothelial cancer, follows with [facility] c/b tumor invasion of L ureter and hydronephrosis. On chemotherapy, missed last session on Friday.   - f/u heme/onc consulted Patient with metastatic urothelial cancer, follows with [facility], c/b tumor invasion of L ureter and hydronephrosis. On chemotherapy, missed last session on Friday.   - f/u heme/onc consulted: no inpatient tx at this time   - Urology consult as per ID Patient with metastatic urothelial cancer, follows with [facility], c/b tumor invasion of L ureter and hydronephrosis. On chemotherapy, missed last session on Friday.   - f/u heme/onc consulted: no inpatient tx at this time   - Urology consulted, worsening Scr, and recent nolen removal outpt

## 2024-10-04 NOTE — CONSULT NOTE ADULT - SUBJECTIVE AND OBJECTIVE BOX
[facility] DIV[facility] OF INFECTIOUS DISEASES  BRADFORD Nolasco S. Shah, Y. Patel, G. Richard  [phone]  ([phone] - weekdays after 5pm and weekends)    JESSIE GREER  69y, Male  [id number]    HPI:  Patient is a 69 year old male with PMH of HTN, HLD, T2DM, CAD and metastatic urothelial cancer ([place] [facility] [place] c/b tumor invasion and hydronephrosis, and recent admission for YANIRA on CKD that improved with Elias catheter placement presenting for mild confusion and fatigue since discharge. Patient reportedly went to app[place] at [facility] [place] where UA was obtained c/f UTI and patient was instructed to present to the ED. Patient otherwise denies fever, chills, chest pain, SOB, nausea, vomiting, diarrhea, constipation, or dysuria. In the ED. patient was afebrile but tachycardic to 102. Labs significant for WBC of 25.43 and UA indeterminate. RVP and CXR unremarkable. Patient admitted for AMS iso likely sepsis 2/2 UTI. (02 Oct 2024 21:44)  ROS: 14 point review of systems completed, pertinent positives and negatives as per HPI.    Allergies: No Known Allergies    PMH -- Coronary artery disease  Hypertension  Hyperlipidemia  Asthma  Diabetes mellitus  Anxiety  Glaucoma  Herniated disc  Bilateral carpal tunnel syndrome  Neuropathy  Kidney stone on right side  Type 2 diabetes mellitus  Stented coronary artery  Metastatic urothelial carcinoma  Acute kidney injury superimposed on CKD    PSH -- S/P shoulder surgery  S/P hernia surgery  S/P angioplasty with stent  Tendon laceration  H/O colonoscopy  Metastatic urothelial carcinoma    FH -- Family history of Alzheimer's disease (Mother)  Family history of type 2 diabetes mellitus in father (Father)  FH: COPD (chronic obstructive pulmonary disease) (Father)    Social History -- denies tobacco, alcohol or illicit drug use    Physical Exam--  Vital Signs Last 24 Hrs  T(F): 97.8 (04 Oct 2024 08:15), Max: 99.7 (04 Oct 2024 04:24)  HR: 71 (04 Oct 2024 08:15) (71 - 95)  BP: 108/72 (04 Oct 2024 08:15) (102/65 - 122/68)  RR: 18 (04 Oct 2024 08:15) (18 - 18)  SpO2: 97% (04 Oct 2024 08:15) (96% - 98%)  General: no acute distress  HEENT: NC/AT, EOMI, anicteric  Lungs: clear to auscultation bilaterally  Heart: S1, S2 present, normal rate  Abdomen: Soft. ND. NT. BS present.   Neuro: AAOx, no obvious focal deficits   Extremities: No cyanosis. No edema.   Skin: Warm. Dry. No visible rash.   Lines: PIV     Laboratory & Imaging Data--  CBC:                       7.0    23.47 )-----------( 418      ( 04 Oct 2024 07:17 )             23.4     WBC Count: 23.47 K/uL (10-04-24 @ 07:17)  WBC Count: 22.14 K/uL (10-03-24 @ 07:23)  WBC Count: 25.43 K/uL (10-02-24 @ 17:03)  WBC Count: 15.52 K/uL (09-28-24 @ 07:42)    CMP: 10-04    132[L]  |  98  |  27[H]  ----------------------------<  107[H]  4.1   |  21[L]  |  1.87[H]    Ca    10.0      04 Oct 2024 07:16  Phos  2.9     10-03  Mg     1.7     10-03    TPro  6.8  /  Alb  2.6[L]  /  TBili  0.5  /  DBili  x   /  AST  38  /  ALT  42  /  AlkPhos  160[H]  10-03    LIVER FUNCTIONS - ( 03 Oct 2024 07:21 )  Alb: 2.6 g/dL / Pro: 6.8 g/dL / ALK PHOS: 160 U/L / ALT: 42 U/L / AST: 38 U/L / GGT: x           Urinalysis (10.02.24 @ 17:45)    Glucose Qualitative, Urine: Negative mg/dL   Blood, Urine: Small   pH Urine: 5.5   Color: Yellow   Urine Appearance: Cloudy   Bilirubin: Negative   Ketone - Urine: Negative mg/dL   Specific Gravity: 1.018   Protein, Urine: 30 mg/dL   Urobilinogen: 1.0 mg/dL   Nitrite: Negative   Leukocyte Esterase Concentration: Trace  Urine Microscopic-Add On (NC) (10.02.24 @ 17:45)    Review: Reviewed   Cast: 3 /LPF   Red Blood Cell - Urine: 4 /HPF   White Blood Cell - Urine: 6 /HPF   Bacteria: Negative /HPF   Epithelial Cells: 1 /HPF    Microbiology: reviewed  Culture - Urine (collected 10-02-24 @ 17:45)  Source: Clean Catch Clean Catch (Midstream)  Final Report (10-04-24 @ 03:53):    No growth    Culture - Blood (collected 10-02-24 @ 15:50)  Source: .Blood BLOOD  Preliminary Report (10-03-24 @ 23:01):    No growth at 24 hours    Culture - Blood (collected 10-02-24 @ 15:35)  Source: .Blood BLOOD  Preliminary Report (10-03-24 @ 23:01):    No growth at 24 hours    Culture - Urine (collected 09-24-24 @ 18:15)  Source: Clean Catch Clean Catch (Midstream)  Final Report (09-25-24 @ 17:42):    No growth    SARS-CoV-2 Result: NotDete (02 Oct 2024 16:29)    Radiology--reviewed  < from: CT Abdomen and Pelvis No Cont (10.03.24 @ 13:59) >  IMPRESSION:  Emphysematous cystitis.    Left renal mass with associated hydronephrosis similar to prior exam.    Interval increase in size of one of the perinephric soft tissue implants.    < end of copied text >  < from: Xray Chest 2 Views PA/Lat (10.02.24 @ 16:19) >    IMPRESSION:  Clear lungs.    < end of copied text >    Active Medications--  apixaban 5 milliGRAM(s) Oral every 12 hours  aspirin  chewable 81 milliGRAM(s) Oral daily  atorvastatin 10 milliGRAM(s) Oral at bedtime  dextrose 5%. 1000 milliLiter(s) IV Continuous <Continuous>  dextrose 5%. 1000 milliLiter(s) IV Continuous <Continuous>  dextrose 50% Injectable 25 Gram(s) IV Push once  dextrose 50% Injectable 12.5 Gram(s) IV Push once  dextrose 50% Injectable 25 Gram(s) IV Push once  dextrose Oral Gel 15 Gram(s) Oral once PRN  glucagon  Injectable 1 milliGRAM(s) IntraMuscular once  influenza  Vaccine (HIGH DOSE) 0.5 milliLiter(s) IntraMuscular once  insulin lispro (ADMELOG) corrective regimen sliding scale   SubCutaneous at bedtime  insulin lispro (ADMELOG) corrective regimen sliding scale   SubCutaneous three times a day before meals  metoprolol tartrate 25 milliGRAM(s) Oral two times a day  pantoprazole    Tablet 40 milliGRAM(s) Oral before breakfast  piperacillin/tazobactam IVPB.. 3.375 Gram(s) IV Intermittent every 8 hours    Current Antimicrobials:   piperacillin/tazobactam IVPB.. 3.375 Gram(s) IV Intermittent every 8 hours    Prior/Completed Antimicrobials:  piperacillin/tazobactam IVPB...    Immunologic:   influenza  Vaccine (HIGH DOSE) 0.5 milliLiter(s) IntraMuscular once     [facility]ES  BRADFORD Nolasco S. Shah, Y. Patel, G. Richard  [phone]  ([phone] - weekdays after 5pm and weekends)    JESSIE GREER  69y, Male  [id number]    HPI:  Patient is a 69 year old male with PMH of HTN, HLD, T2DM, CAD and metastatic urothelial cancer (follows with [facility] [place]) c/b tumor invasion and hydronephrosis, and recent admission for YANIRA on CKD that improved with Nolen catheter placement presenting for mild confusion and fatigue since discharge. Patient reportedly went to appt at [facility] on Tuesday and had nolen removed and was noted with AMS and worsened leukocytosis and was sent to the ER. Per wife, no UA was done by [facility] as patient did not void there; urine same was taken here in ER via straight cath. Patients wife reports he had been very confused and not himself; also noted he had poor appetite. Patient currently reports he feels much better, he denies having fever or chills. He denies chest pain, dyspnea, abdominal pain, nausea, vomiting, diarrhea. Per wife, feels he is voiding better.   ROS: 14 point review of systems completed, pertinent positives and negatives as per HPI.    Allergies: No Known Allergies    PMH -- Coronary artery disease  Hypertension  Hyperlipidemia  Asthma  Diabetes mellitus  Anxiety  Glaucoma  Herniated disc  Bilateral carpal tunnel syndrome  Neuropathy  Kidney stone on right side  Type 2 diabetes mellitus  Stented coronary artery  Metastatic urothelial carcinoma  Acute kidney injury superimposed on CKD    PSH -- S/P shoulder surgery  S/P hernia surgery  S/P angioplasty with stent  Tendon laceration  H/O colonoscopy  Metastatic urothelial carcinoma    FH -- Family history of Alzheimer's disease (Mother)  Family history of type 2 diabetes mellitus in father (Father)  FH: COPD (chronic obstructive pulmonary disease) (Father)    Social History -- denies tobacco, alcohol or illicit drug use    Physical Exam--  Vital Signs Last 24 Hrs  T(F): 97.8 (04 Oct 2024 08:15), Max: 99.7 (04 Oct 2024 04:24)  HR: 71 (04 Oct 2024 08:15) (71 - 95)  BP: 108/72 (04 Oct 2024 08:15) (102/65 - 122/68)  RR: 18 (04 Oct 2024 08:15) (18 - 18)  SpO2: 97% (04 Oct 2024 08:15) (96% - 98%)  General: no acute distress  HEENT: NC/AT, EOMI, anicteric  Lungs: clear to auscultation bilaterally  Heart: S1, S2 present, normal rate  Abdomen: Soft. ND. NT. BS present.   Neuro: AAOx3, no obvious focal deficits   Extremities: No cyanosis. No edema.   Skin: Warm. Dry. No visible rash.     Laboratory & Imaging Data--  CBC:                       7.0    23.47 )-----------( 418      ( 04 Oct 2024 07:17 )             23.4     WBC Count: 23.47 K/uL (10-04-24 @ 07:17)  WBC Count: 22.14 K/uL (10-03-24 @ 07:23)  WBC Count: 25.43 K/uL (10-02-24 @ 17:03)  WBC Count: 15.52 K/uL (09-28-24 @ 07:42)    CMP: 10-04    132[L]  |  98  |  27[H]  ----------------------------<  107[H]  4.1   |  21[L]  |  1.87[H]    Ca    10.0      04 Oct 2024 07:16  Phos  2.9     10-03  Mg     1.7     10-03    TPro  6.8  /  Alb  2.6[L]  /  TBili  0.5  /  DBili  x   /  AST  38  /  ALT  42  /  AlkPhos  160[H]  10-03    LIVER FUNCTIONS - ( 03 Oct 2024 07:21 )  Alb: 2.6 g/dL / Pro: 6.8 g/dL / ALK PHOS: 160 U/L / ALT: 42 U/L / AST: 38 U/L / GGT: x           Urinalysis (10.02.24 @ 17:45)    Glucose Qualitative, Urine: Negative mg/dL   Blood, Urine: Small   pH Urine: 5.5   Color: Yellow   Urine Appearance: Cloudy   Bilirubin: Negative   Ketone - Urine: Negative mg/dL   Specific Gravity: 1.018   Protein, Urine: 30 mg/dL   Urobilinogen: 1.0 mg/dL   Nitrite: Negative   Leukocyte Esterase Concentration: Trace  Urine Microscopic-Add On (NC) (10.02.24 @ 17:45)    Review: Reviewed   Cast: 3 /LPF   Red Blood Cell - Urine: 4 /HPF   White Blood Cell - Urine: 6 /HPF   Bacteria: Negative /HPF   Epithelial Cells: 1 /HPF    Microbiology: reviewed  Culture - Urine (collected 10-02-24 @ 17:45)  Source: Clean Catch Clean Catch (Midstream)  Final Report (10-04-24 @ 03:53):    No growth    Culture - Blood (collected 10-02-24 @ 15:50)  Source: .Blood BLOOD  Preliminary Report (10-03-24 @ 23:01):    No growth at 24 hours    Culture - Blood (collected 10-02-24 @ 15:35)  Source: .Blood BLOOD  Preliminary Report (10-03-24 @ 23:01):    No growth at 24 hours    Culture - Urine (collected 09-24-24 @ 18:15)  Source: Clean Catch Clean Catch (Midstream)  Final Report (09-25-24 @ 17:42):    No growth    SARS-CoV-2 Result: NotDete (02 Oct 2024 16:29)    Radiology--reviewed  < from: CT Abdomen and Pelvis No Cont (10.03.24 @ 13:59) >  IMPRESSION:  Emphysematous cystitis.    Left renal mass with associated hydronephrosis similar to prior exam.    Interval increase in size of one of the perinephric soft tissue implants.    < end of copied text >  < from: Xray Chest 2 Views PA/Lat (10.02.24 @ 16:19) >    IMPRESSION:  Clear lungs.    < end of copied text >    Active Medications--  apixaban 5 milliGRAM(s) Oral every 12 hours  aspirin  chewable 81 milliGRAM(s) Oral daily  atorvastatin 10 milliGRAM(s) Oral at bedtime  dextrose 5%. 1000 milliLiter(s) IV Continuous <Continuous>  dextrose 5%. 1000 milliLiter(s) IV Continuous <Continuous>  dextrose 50% Injectable 25 Gram(s) IV Push once  dextrose 50% Injectable 12.5 Gram(s) IV Push once  dextrose 50% Injectable 25 Gram(s) IV Push once  dextrose Oral Gel 15 Gram(s) Oral once PRN  glucagon  Injectable 1 milliGRAM(s) IntraMuscular once  influenza  Vaccine (HIGH DOSE) 0.5 milliLiter(s) IntraMuscular once  insulin lispro (ADMELOG) corrective regimen sliding scale   SubCutaneous at bedtime  insulin lispro (ADMELOG) corrective regimen sliding scale   SubCutaneous three times a day before meals  metoprolol tartrate 25 milliGRAM(s) Oral two times a day  pantoprazole    Tablet 40 milliGRAM(s) Oral before breakfast  piperacillin/tazobactam IVPB.. 3.375 Gram(s) IV Intermittent every 8 hours    Current Antimicrobials:   piperacillin/tazobactam IVPB.. 3.375 Gram(s) IV Intermittent every 8 hours    Prior/Completed Antimicrobials:  piperacillin/tazobactam IVPB...    Immunologic:   influenza  Vaccine (HIGH DOSE) 0.5 milliLiter(s) IntraMuscular once

## 2024-10-04 NOTE — DIETITIAN INITIAL EVALUATION ADULT - ORAL INTAKE PTA/DIET HISTORY
Patient known to nutrition services from last admission <2 weeks ago. Patient reports he has had decreased PO intake/appetite more than usual in the past month influenced by changes in taste (potential side effect from treatment for urothelial CA) and dry mouth at times. Denied chewing/swallowing impairment or nausea/vomiting. Patient reports he makes attempt with all meals at home, has encouragement from daughter who visits patient at home and trying to encourage plant based nutrition shakes.

## 2024-10-04 NOTE — DIETITIAN INITIAL EVALUATION ADULT - ENERGY INTAKE
Fair (50-75%) Patient w/ overall fair PO intake/appetite reported, some meals almost 100% and some others ~25-50% per the patient. Patient amenable for oral nutrition supplementation for extra caloric/protein intake, requesting plant based oral nutrition shake, will offer [employer] Glucose Support.

## 2024-10-04 NOTE — PROGRESS NOTE ADULT - PROBLEM SELECTOR PLAN 2
Daily Note     Today's date: 2023  Patient name: Cassandra Garg  : 1949  MRN: [id number]  Referring provider: Shannan Young   Dx:   Encounter Diagnosis     ICD-10-CM    1. Recurrent falls  R29.6       2. Ambulatory dysfunction  R26.2       3. Primary osteoarthritis of right hip  M16.11       4. Greater trochanteric bursitis of right hip  M70.61                 POC expires Auth Status Total     Start date  Expiration date PT/OT + Visit Limit? Co-Insurance   23 Approved  16 23 BOMN $35 Co-pay   10/24/23  16 7/23 10/23     10/24/23 Approved 10 23      Approved 10 23                      Visit/Unit Tracking  AUTH Status: Date -           Authed: [id number] Used 1 2 3 4 5 6         APPROVED 10 VISITS  Remaining  9 8 7 6 5 4               Subjective: Patient presents to PT reporting no new changes. Objective: See treatment diary below. Vitals: (taken twice due to inability to hear first attempt)  BP: 144/92 mmHg (seated, LUE)    NMR (2.5# ankle weights): 2 min on/off intervals  - STS (1 foam) to fatigue: 2 set, 15 reps; no UE  - FWD/BWD ambulation 0 UE  - Standing marching  - Sidestepping on foam 0-1 UE along // bars (2x1.5 min)  - FWD step up on foam pad            Assessment: Patient tolerated treatment well today with continued focus on functional mobility and balance training. Patient tolerated continued performance of interval training well today. However, required extended stand break while performing side-stepping on foam. Patient had decreased postural stability when side stepping on compliant surface, utilizing UE support on // bars to stabilize over stepping strategies. Good tolerance of STS this session, consider progressing to no foam or performance with external load.  Patient will continue to benefit from skilled OPPT services to maintain current level of function in order to maximize safe functional mobility and decrease risk of additional falls. Plan: Continue per plan of care. Outcome Measures 7/5/23 PN  8/1/23 PN  9/19/2023 RN   10/19/2023 PN  11/9/23    200/100 seated L UE  65 HR  96% PO2  Rested sitting, BP = 180/90 mmHg  170/94 170/86 Before therapy:     BP: 182/90 mmHg    BP: 178/80    After therapy:     BP: 190/88 Before therapy:     BP:142/60 mmHg        After therapy:     BP: 145/71 mmHg   5xSTS 16.13 with foam pad on seat 16.1 sec w/ foam and 2 UE 13.89 sec w/ foam and 2 UE 14.45 sec w foam and 0 UE  14.06 w foam and 2 UE   TUG  - Regular  - Cognitive -13.16 no UE but foam on seat    -13.40 but numbers incorrect    13 sec  15.4 sec    12.86 sec  17.25 sec   13.77 sec   12.85 sec (counting BWD by 3's)    No AD, foam pad on chair    15.15 sec  17.04 sec (counting backwards by 3's)   10 meter 11.34 sec   =0.88  M/sec no SPC  0.88 m/s w/o AD 0.91 m/s no AD 0.83 m/s w/quad cane  0.87 m/s, no AD  0.83 m/s with quad cane   0.84 m/s, no AD    ROMERO 45/56 48/56 49/56 NT NT   FGA 21/30    17/30 17/30 15/30   DGI   15/24 14/24 15/24   mCTSIB  - FTEO (firm)  - FTEC (firm)  - FTEO (foam)  - FTEC (foam)        6MWT NT ** 920 ft w/o  ft no  ft, no  ft, with quad cane (light touch)   UE anival    NT    LE strength Improved, see above   NT         Access Code: [email or web address]  URL: [email or web address]  Date: 07/27/2023  Prepared by: Frankie Bateman    Exercises  - Sit to Stand  - 1 x daily - 7 x weekly - 2 sets - 20 reps  - Standing March with Counter Support  - 1 x daily - 7 x weekly - 2 sets - 20 reps - 3 seconds hold  - Standing Hip Abduction with Counter Support  - 1 x daily - 7 x weekly - 2 sets - 20 reps - 3 seconds hold  - Standing Hip Extension with Counter Support  - 1 x daily - 7 x weekly - 2 sets - 20 reps - 3 seconds hold  - Side Stepping with Counter Support  - 1 x daily - 7 x weekly - 10 feet - 10 reps Continue home medication Atorvastatin 10mg qhs, ASA 81 mg qd

## 2024-10-04 NOTE — PROGRESS NOTE ADULT - PROBLEM SELECTOR PLAN 1
On admission, patient was confused, and tachycardic with WBC count of 25. He was recently admitted for YANIRA on CKD, s/p Elias placement. On Zosyn for sepsis likely 2/2 UTI given mental status and recent Elias placement. UA neg. Hemodynamically stable, afebrile. No abdominal tenderness on exam.  - lactate normalized  - continue Zosyn   - f/u blood cultures, urine cultures   - f/u CT abdomen and pelvis  - Bp stable started home metoprolol 25 mg BID w/ hold parameters  - metabolic encephalopathy now resolved AAOx4 On admission, patient was confused, and tachycardic with WBC count of 25. He was recently admitted for YANIRA on CKD, s/p Elias placement. On Zosyn for sepsis likely 2/2 UTI given mental status and recent Elias placement. UA neg. Hemodynamically stable, afebrile. No abdominal tenderness on exam. CT abd/pelvis -> emphysematous cystitis   - lactate normalized, blood/ urine cx neg   - dc Zosyn, start Ceftriaxone 1gm q24hr as per ID   - Bp stable started home metoprolol 25 mg BID w/ hold parameters  - metabolic encephalopathy now resolved AAOx4 On admission, patient was confused, and tachycardic with WBC count of 25. He was recently admitted for YANIRA on CKD, s/p Elias placement. On Zosyn for sepsis likely 2/2 UTI given mental status and recent Elias placement. UA neg. Hemodynamically stable, afebrile. No abdominal tenderness on exam. CT abd/pelvis -> emphysematous cystitis   - lactate normalized, blood/ urine cx neg   - dc'ed Zosyn, start Ceftriaxone 1gm q24hr as per ID   - Bp stable started home metoprolol 25 mg BID w/ hold parameters  - metabolic encephalopathy now resolved AAOx4

## 2024-10-04 NOTE — DIETITIAN INITIAL EVALUATION ADULT - ADD RECOMMEND
1. continue current diet as tolerated of: consistent carbohydrate diet  2. encourage PO intake, protein source with each meal as tolerated  3. recommend addition of [email or web address] Glucose Support BID for extra caloric/protein support  4. request updated weight of patient when appropriate  5. monitor PO intake, weight trend, electrolytes, blood glucose levels, labs, BMs

## 2024-10-04 NOTE — CONSULT NOTE ADULT - ASSESSMENT
69 year old male with chronic left hydronephrosis, emphysematous cystitis; PMHx notable for metastatic left urothelial cancer on chemotherapy; had prior L stent but failed on exchange by his urologist at [facility] [place] [place]    -  -  -  -  -  - 69 year old male with chronic left hydronephrosis, emphysematous cystitis; PMHx notable for metastatic left urothelial cancer on chemotherapy; had prior L stent but failed on exchange by his urologist at [facility].    Patient currently AVSS with no urinary symptoms other than stable left flank sensation of fullness, patient voiding spontaneously with PVR 8cc, UCx and Bcx negative. Although imaging shows emphysematous cystitis which is typically managed with nolen placement and course of antibiotics, patient adamantly refusing nolen and is voiding with low PVR so reasonable to continue antibiotics per ID recommendations without nolen placement for now.    Recommendations:  - No urologic intervention  - Continue antibiotics per ID > CTX, if patient continues to improve, can transition to PO Vantin for 2 week course per ID  - Continue to trend WBC, Cr, monitor for fever  - Document PVR, if elevated, patient will likely require nolen catheter > PVR = 8 cc so can be without nolen for now  - Rest of care per primary team  - Continue outpatient follow up at [facility] for metastatic urothelial cancer treatment    D/w Dr. Adwoa Melo [facility]  [address] [place] [phone]

## 2024-10-04 NOTE — DIETITIAN INITIAL EVALUATION ADULT - ETIOLOGY
hypermetabolic demand for nutrients 2/2 skin integrity chronic illness (metastatic urothelial CA per chart) w/ reduced PO intake/appetite and unintentional wt loss

## 2024-10-04 NOTE — PROGRESS NOTE ADULT - ASSESSMENT
69-year-old male w/ PMH of HTN, HLD, T2DM, CAD and metastatic urothelial CA (follows with [facility] [place]) c/b tumor invasion and hydronephrosis, and recent admission for YANIRA on CKD that improved with Elias catheter placement presenting for mild confusion and fatigue since discharge. In the ED. patient was afebrile but tachycardic to 102. Labs significant for WBC of 25.43 and UA indeterminate. Patient admitted for AMS iso likely sepsis 2/2 UTI.

## 2024-10-04 NOTE — DIETITIAN INITIAL EVALUATION ADULT - REASON INDICATOR FOR ASSESSMENT
Consult for "MST score 2 or >"  Source: chart, patient (answers majority of questions appropriately, noted w/ intermittent forgetfulness/disorientation per chart)  Chart reviewed, events noted

## 2024-10-04 NOTE — PROGRESS NOTE ADULT - ATTENDING COMMENTS
agree with above and change made to above  d/w ID started on CTX  urology consult   monitor BMP and CBC daily     d/w MEdicine ACP vladimir
agree with and changes made to above  - c/w IV abx as above, pending culture results  - PT consulted     d/w Medicine ALAN Burrell

## 2024-10-04 NOTE — DIETITIAN INITIAL EVALUATION ADULT - REASON
Patient unable to consent to NFPE (intermittent forgetfulness, disorientation pre chart); meets criteria for malnutrition through PO intake and weight loss parameters, persists from admission <2 weeks ago

## 2024-10-04 NOTE — DIETITIAN INITIAL EVALUATION ADULT - OTHER INFO
NKFA per patient, confirmed by chart. Patient reports his UBW has recently been 210lbs, and had a prior history of being at least 60-70lbs heavier near 270lbs and had unintentional weight loss with ongoing cancer treatment. Recent weight history per [facility] growth chart as follows: 210lbs (9/24/2024), 200lbs (9/4/2024), 210lbs (8/7/2024), 213lbs (8/1/2024), 260lbs (9/28/2022), 259lbs (8/1/2022). Patient identified with malnutrition during last RD assessment during admission <2 weeks ago, persists at this time. ? accuracy of dosing BW as it is almost 70lbs lower than recent BW <2 weeks ago, patient in bedside chair unable to obtain new bedscale weight. Will monitor weight trend.    - Hyponatremia; overall uptrending. Management per team.  - HgbA1C 6.6% (8/3); patient w/ h/o DM PTA per chart. SS admelog in place for insulin regimen.  - Receiving antibiotic regimen per team.

## 2024-10-04 NOTE — DIETITIAN INITIAL EVALUATION ADULT - PROBLEM SELECTOR PLAN 1
- P/w confusion, lethargy following admission for YANIRA on CKD s/p Elias placement (since removed)  - In the ED, tachycardic to102, WBC of 25.43, UA indeterminate; s/p Zosyn x1  > C/w Zosyn for complicated UTI  > F/u renal US given past ureteral stone, renal mass invasion  > F/u urine, blood cultures

## 2024-10-04 NOTE — PROGRESS NOTE ADULT - SUBJECTIVE AND OBJECTIVE BOX
Patient is a 69 y old Male who presents with a chief complaint of AMS (04 Oct 2024 09:13)      SUBJECTIVE / OVERNIGHT EVENTS:    ROS:  All other review of systems negative    Allergies    No Known Allergies    Intolerances        MEDICATIONS  (STANDING):  apixaban 5 milliGRAM(s) Oral every 12 hours  aspirin  chewable 81 milliGRAM(s) Oral daily  atorvastatin 10 milliGRAM(s) Oral at bedtime  dextrose 5%. 1000 milliLiter(s) (100 mL/Hr) IV Continuous <Continuous>  dextrose 5%. 1000 milliLiter(s) (50 mL/Hr) IV Continuous <Continuous>  dextrose 50% Injectable 12.5 Gram(s) IV Push once  dextrose 50% Injectable 25 Gram(s) IV Push once  dextrose 50% Injectable 25 Gram(s) IV Push once  glucagon  Injectable 1 milliGRAM(s) IntraMuscular once  influenza  Vaccine (HIGH DOSE) 0.5 milliLiter(s) IntraMuscular once  insulin lispro (ADMELOG) corrective regimen sliding scale   SubCutaneous three times a day before meals  insulin lispro (ADMELOG) corrective regimen sliding scale   SubCutaneous at bedtime  metoprolol tartrate 25 milliGRAM(s) Oral two times a day  pantoprazole    Tablet 40 milliGRAM(s) Oral before breakfast  piperacillin/tazobactam IVPB.. 3.375 Gram(s) IV Intermittent every 8 hours    MEDICATIONS  (PRN):  dextrose Oral Gel 15 Gram(s) Oral once PRN Blood Glucose LESS THAN 70 milliGRAM(s)/deciliter      Vital Signs Last 24 Hrs  T(C): 36.6 (04 Oct 2024 08:15), Max: 37.6 (04 Oct 2024 04:24)  T(F): 97.8 (04 Oct 2024 08:15), Max: 99.7 (04 Oct 2024 04:24)  HR: 71 (04 Oct 2024 08:15) (71 - 95)  BP: 108/72 (04 Oct 2024 08:15) (102/65 - 122/68)  BP(mean): --  RR: 18 (04 Oct 2024 08:15) (18 - 18)  SpO2: 97% (04 Oct 2024 08:15) (96% - 98%)    Parameters below as of 04 Oct 2024 08:15  Patient On (Oxygen Delivery Method): room air      CAPILLARY BLOOD GLUCOSE      POCT Blood Glucose.: 126 mg/dL (04 Oct 2024 08:22)  POCT Blood Glucose.: 142 mg/dL (03 Oct 2024 21:00)  POCT Blood Glucose.: 149 mg/dL (03 Oct 2024 17:17)  POCT Blood Glucose.: 127 mg/dL (03 Oct 2024 12:39)    I&O's Summary    03 Oct 2024 07:01  -  04 Oct 2024 07:00  --------------------------------------------------------  IN: 1310 mL / OUT: 1200 mL / NET: 110 mL    04 Oct 2024 07:01  -  04 Oct 2024 10:56  --------------------------------------------------------  IN: 480 mL / OUT: 0 mL / NET: 480 mL        PHYSICAL EXAM:  GENERAL: NAD, well-developed  HEAD:  Atraumatic, Normocephalic  EYES: EOMI, PERRLA, conjunctiva and sclera clear  NECK: Supple, No JVD  CHEST/LUNG: Clear to auscultation bilaterally; No wheeze  HEART: Regular rate and rhythm; No murmurs, rubs, or gallops  ABDOMEN: Soft, Nontender, Nondistended; Bowel sounds present  EXTREMITIES:  2+ Peripheral Pulses, No clubbing, cyanosis, or edema  NEUROLOGY: AAOx3, non-focal  PSYCH: calm  SKIN: No rashes or lesions    LABS:                        7.0    23.47 )-----------( 418      ( 04 Oct 2024 07:17 )             23.4     10-04    132[L]  |  98  |  27[H]  ----------------------------<  107[H]  4.1   |  21[L]  |  1.87[H]    Ca    10.0      04 Oct 2024 07:16  Phos  2.9     10-03  Mg     1.7     10-03    TPro  6.8  /  Alb  2.6[L]  /  TBili  0.5  /  DBili  x   /  AST  38  /  ALT  42  /  AlkPhos  160[H]  10-03    PT/INR - ( 02 Oct 2024 16:31 )   PT: 17.8 sec;   INR: 1.57 ratio         PTT - ( 02 Oct 2024 16:31 )  PTT:32.0 sec      Urinalysis Basic - ( 04 Oct 2024 07:16 )    Color: x / Appearance: x / SG: x / pH: x  Gluc: 107 mg/dL / Ketone: x  / Bili: x / Urobili: x   Blood: x / Protein: x / Nitrite: x   Leuk Esterase: x / RBC: x / WBC x   Sq Epi: x / Non Sq Epi: x / Bacteria: x        RADIOLOGY & ADDITIONAL TESTS:    Imaging Personally Reviewed:    Consultant(s) Notes Reviewed:      Care Discussed with Consultants/Other Providers:    Case Discussed with Family:    Goals of Care: Patient is a 69 y old Male who presents with a chief complaint of AMS (04 Oct 2024 09:13)      SUBJECTIVE / OVERNIGHT EVENTS: No overnight events. Patient examined at bedside, has no acute complaints. Patient is urinating well, will follow up for bladder scan.     ROS:  All other review of systems negative    Allergies    No Known Allergies    Intolerances    MEDICATIONS  (STANDING):  apixaban 5 milliGRAM(s) Oral every 12 hours  aspirin  chewable 81 milliGRAM(s) Oral daily  atorvastatin 10 milliGRAM(s) Oral at bedtime  dextrose 5%. 1000 milliLiter(s) (100 mL/Hr) IV Continuous <Continuous>  dextrose 5%. 1000 milliLiter(s) (50 mL/Hr) IV Continuous <Continuous>  dextrose 50% Injectable 12.5 Gram(s) IV Push once  dextrose 50% Injectable 25 Gram(s) IV Push once  dextrose 50% Injectable 25 Gram(s) IV Push once  glucagon  Injectable 1 milliGRAM(s) IntraMuscular once  influenza  Vaccine (HIGH DOSE) 0.5 milliLiter(s) IntraMuscular once  insulin lispro (ADMELOG) corrective regimen sliding scale   SubCutaneous three times a day before meals  insulin lispro (ADMELOG) corrective regimen sliding scale   SubCutaneous at bedtime  metoprolol tartrate 25 milliGRAM(s) Oral two times a day  pantoprazole    Tablet 40 milliGRAM(s) Oral before breakfast  piperacillin/tazobactam IVPB.. 3.375 Gram(s) IV Intermittent every 8 hours    MEDICATIONS  (PRN):  dextrose Oral Gel 15 Gram(s) Oral once PRN Blood Glucose LESS THAN 70 milliGRAM(s)/deciliter      Vital Signs Last 24 Hrs  T(C): 36.6 (04 Oct 2024 08:15), Max: 37.6 (04 Oct 2024 04:24)  T(F): 97.8 (04 Oct 2024 08:15), Max: 99.7 (04 Oct 2024 04:24)  HR: 71 (04 Oct 2024 08:15) (71 - 95)  BP: 108/72 (04 Oct 2024 08:15) (102/65 - 122/68)  BP(mean): --  RR: 18 (04 Oct 2024 08:15) (18 - 18)  SpO2: 97% (04 Oct 2024 08:15) (96% - 98%)    Parameters below as of 04 Oct 2024 08:15  Patient On (Oxygen Delivery Method): room air      CAPILLARY BLOOD GLUCOSE      POCT Blood Glucose.: 126 mg/dL (04 Oct 2024 08:22)  POCT Blood Glucose.: 142 mg/dL (03 Oct 2024 21:00)  POCT Blood Glucose.: 149 mg/dL (03 Oct 2024 17:17)  POCT Blood Glucose.: 127 mg/dL (03 Oct 2024 12:39)    I&O's Summary    03 Oct 2024 07:01  -  04 Oct 2024 07:00  --------------------------------------------------------  IN: 1310 mL / OUT: 1200 mL / NET: 110 mL    04 Oct 2024 07:01  -  04 Oct 2024 10:56  --------------------------------------------------------  IN: 480 mL / OUT: 0 mL / NET: 480 mL        PHYSICAL EXAM:  GENERAL: NAD, well-developed  HEAD:  Atraumatic, Normocephalic  EYES: EOMI, PERRLA, conjunctiva and sclera clear  NECK: Supple, No JVD  CHEST/LUNG: Clear to auscultation bilaterally; No wheeze  HEART: Regular rate and rhythm; No murmurs, rubs, or gallops  ABDOMEN: Soft, Nontender, Nondistended; Bowel sounds present  EXTREMITIES:  2+ Peripheral Pulses, No clubbing, cyanosis, or edema  NEUROLOGY: AAOx3, non-focal  PSYCH: calm  SKIN: No rashes or lesions    LABS:                        7.0    23.47 )-----------( 418      ( 04 Oct 2024 07:17 )             23.4     10-04    132[L]  |  98  |  27[H]  ----------------------------<  107[H]  4.1   |  21[L]  |  1.87[H]    Ca    10.0      04 Oct 2024 07:16  Phos  2.9     10-03  Mg     1.7     10-03    TPro  6.8  /  Alb  2.6[L]  /  TBili  0.5  /  DBili  x   /  AST  38  /  ALT  42  /  AlkPhos  160[H]  10-03    PT/INR - ( 02 Oct 2024 16:31 )   PT: 17.8 sec;   INR: 1.57 ratio         PTT - ( 02 Oct 2024 16:31 )  PTT:32.0 sec      Urinalysis Basic - ( 04 Oct 2024 07:16 )    Color: x / Appearance: x / SG: x / pH: x  Gluc: 107 mg/dL / Ketone: x  / Bili: x / Urobili: x   Blood: x / Protein: x / Nitrite: x   Leuk Esterase: x / RBC: x / WBC x   Sq Epi: x / Non Sq Epi: x / Bacteria: x        RADIOLOGY & ADDITIONAL TESTS:    Imaging Personally Reviewed:    Consultant(s) Notes Reviewed:      Care Discussed with Consultants/Other Providers:    Case Discussed with Family:    Goals of Care: Patient is a 69 y old Male who presents with a chief complaint of AMS (04 Oct 2024 09:13)      SUBJECTIVE / OVERNIGHT EVENTS: No overnight events. Patient examined at bedside, has no acute complaints. Patient is urinating well, will follow up for bladder scan.     ROS:  All other review of systems negative    Allergies    No Known Allergies    Intolerances    MEDICATIONS  (STANDING):  apixaban 5 milliGRAM(s) Oral every 12 hours  aspirin  chewable 81 milliGRAM(s) Oral daily  atorvastatin 10 milliGRAM(s) Oral at bedtime  dextrose 5%. 1000 milliLiter(s) (100 mL/Hr) IV Continuous <Continuous>  dextrose 5%. 1000 milliLiter(s) (50 mL/Hr) IV Continuous <Continuous>  dextrose 50% Injectable 12.5 Gram(s) IV Push once  dextrose 50% Injectable 25 Gram(s) IV Push once  dextrose 50% Injectable 25 Gram(s) IV Push once  glucagon  Injectable 1 milliGRAM(s) IntraMuscular once  influenza  Vaccine (HIGH DOSE) 0.5 milliLiter(s) IntraMuscular once  insulin lispro (ADMELOG) corrective regimen sliding scale   SubCutaneous three times a day before meals  insulin lispro (ADMELOG) corrective regimen sliding scale   SubCutaneous at bedtime  metoprolol tartrate 25 milliGRAM(s) Oral two times a day  pantoprazole    Tablet 40 milliGRAM(s) Oral before breakfast  piperacillin/tazobactam IVPB.. 3.375 Gram(s) IV Intermittent every 8 hours    MEDICATIONS  (PRN):  dextrose Oral Gel 15 Gram(s) Oral once PRN Blood Glucose LESS THAN 70 milliGRAM(s)/deciliter      Vital Signs Last 24 Hrs  T(C): 36.6 (04 Oct 2024 08:15), Max: 37.6 (04 Oct 2024 04:24)  T(F): 97.8 (04 Oct 2024 08:15), Max: 99.7 (04 Oct 2024 04:24)  HR: 71 (04 Oct 2024 08:15) (71 - 95)  BP: 108/72 (04 Oct 2024 08:15) (102/65 - 122/68)  BP(mean): --  RR: 18 (04 Oct 2024 08:15) (18 - 18)  SpO2: 97% (04 Oct 2024 08:15) (96% - 98%)    Parameters below as of 04 Oct 2024 08:15  Patient On (Oxygen Delivery Method): room air      CAPILLARY BLOOD GLUCOSE      POCT Blood Glucose.: 126 mg/dL (04 Oct 2024 08:22)  POCT Blood Glucose.: 142 mg/dL (03 Oct 2024 21:00)  POCT Blood Glucose.: 149 mg/dL (03 Oct 2024 17:17)  POCT Blood Glucose.: 127 mg/dL (03 Oct 2024 12:39)    I&O's Summary    03 Oct 2024 07:01  -  04 Oct 2024 07:00  --------------------------------------------------------  IN: 1310 mL / OUT: 1200 mL / NET: 110 mL    04 Oct 2024 07:01  -  04 Oct 2024 10:56  --------------------------------------------------------  IN: 480 mL / OUT: 0 mL / NET: 480 mL        PHYSICAL EXAM:  GENERAL: NAD, well-developed  HEAD:  Atraumatic, Normocephalic  EYES: EOMI, PERRLA, conjunctiva and sclera clear  NECK: Supple, No JVD  CHEST/LUNG: Clear to auscultation bilaterally; No wheeze  HEART: Regular rate and rhythm; No murmurs, rubs, or gallops  ABDOMEN: Soft, Nontender, Nondistended; Bowel sounds present  EXTREMITIES:  2+ Peripheral Pulses, No clubbing, cyanosis, or edema  NEUROLOGY: AAOx3, non-focal  PSYCH: calm  SKIN: No rashes or lesions    LABS:                        7.0    23.47 )-----------( 418      ( 04 Oct 2024 07:17 )             23.4     10-04    132[L]  |  98  |  27[H]  ----------------------------<  107[H]  4.1   |  21[L]  |  1.87[H]    Ca    10.0      04 Oct 2024 07:16  Phos  2.9     10-03  Mg     1.7     10-03    TPro  6.8  /  Alb  2.6[L]  /  TBili  0.5  /  DBili  x   /  AST  38  /  ALT  42  /  AlkPhos  160[H]  10-03    PT/INR - ( 02 Oct 2024 16:31 )   PT: 17.8 sec;   INR: 1.57 ratio         PTT - ( 02 Oct 2024 16:31 )  PTT:32.0 sec      Urinalysis Basic - ( 04 Oct 2024 07:16 )    Color: x / Appearance: x / SG: x / pH: x  Gluc: 107 mg/dL / Ketone: x  / Bili: x / Urobili: x   Blood: x / Protein: x / Nitrite: x   Leuk Esterase: x / RBC: x / WBC x   Sq Epi: x / Non Sq Epi: x / Bacteria: x        RADIOLOGY & ADDITIONAL TESTS:    updated patient's family at bedside in detail 10/4

## 2024-10-04 NOTE — DIETITIAN INITIAL EVALUATION ADULT - PERTINENT LABORATORY DATA
10-04    132[L]  |  98  |  27[H]  ----------------------------<  107[H]  4.1   |  21[L]  |  1.87[H]    Ca    10.0      04 Oct 2024 07:16  Phos  2.9     10-03  Mg     1.7     10-03    TPro  6.8  /  Alb  2.6[L]  /  TBili  0.5  /  DBili  x   /  AST  38  /  ALT  42  /  AlkPhos  160[H]  10-03  POCT Blood Glucose.: 133 mg/dL (10-04-24 @ 16:52)  A1C with Estimated Average Glucose Result: 6.6 % (08-03-24 @ 06:57)

## 2024-10-04 NOTE — DIETITIAN INITIAL EVALUATION ADULT - REASON FOR ADMISSION
Disorientation    Per chart, patient is a 68 y/o male with PMH including HTN, HLD, T2DM, CAD, metastatic urothelial CA (follows w/ [facility]) c/b tumor invasion and hydronephrosis, h/o recent admission for YANIRA on CKD. Patient presents to [facility] w/ mild confusion, fatigue since recent discharge. Admitted w/ AMS in setting of likely sepsis 2/2 UTI per MD. Started on antibiotic regimen by team.

## 2024-10-05 LAB
ANION GAP SERPL CALC-SCNC: 14 MMOL/L — SIGNIFICANT CHANGE UP (ref 5–17)
BUN SERPL-MCNC: 24 MG/DL — HIGH (ref 7–23)
CALCIUM SERPL-MCNC: 10.1 MG/DL — SIGNIFICANT CHANGE UP (ref 8.4–10.5)
CHLORIDE SERPL-SCNC: 97 MMOL/L — SIGNIFICANT CHANGE UP (ref 96–108)
CO2 SERPL-SCNC: 18 MMOL/L — LOW (ref 22–31)
CREAT SERPL-MCNC: 1.88 MG/DL — HIGH (ref 0.5–1.3)
EGFR: 38 ML/MIN/1.73M2 — LOW
GLUCOSE BLDC GLUCOMTR-MCNC: 115 MG/DL — HIGH (ref 70–99)
GLUCOSE BLDC GLUCOMTR-MCNC: 121 MG/DL — HIGH (ref 70–99)
GLUCOSE BLDC GLUCOMTR-MCNC: 126 MG/DL — HIGH (ref 70–99)
GLUCOSE BLDC GLUCOMTR-MCNC: 133 MG/DL — HIGH (ref 70–99)
GLUCOSE SERPL-MCNC: 109 MG/DL — HIGH (ref 70–99)
HCT VFR BLD CALC: 27.1 % — LOW (ref 39–50)
HGB BLD-MCNC: 7.8 G/DL — LOW (ref 13–17)
MAGNESIUM SERPL-MCNC: 1.6 MG/DL — SIGNIFICANT CHANGE UP (ref 1.6–2.6)
MCHC RBC-ENTMCNC: 24.2 PG — LOW (ref 27–34)
MCHC RBC-ENTMCNC: 28.8 GM/DL — LOW (ref 32–36)
MCV RBC AUTO: 84.2 FL — SIGNIFICANT CHANGE UP (ref 80–100)
NRBC # BLD: 0 /100 WBCS — SIGNIFICANT CHANGE UP (ref 0–0)
PHOSPHATE SERPL-MCNC: 3 MG/DL — SIGNIFICANT CHANGE UP (ref 2.5–4.5)
PLATELET # BLD AUTO: 377 K/UL — SIGNIFICANT CHANGE UP (ref 150–400)
POTASSIUM SERPL-MCNC: 3.9 MMOL/L — SIGNIFICANT CHANGE UP (ref 3.5–5.3)
POTASSIUM SERPL-SCNC: 3.9 MMOL/L — SIGNIFICANT CHANGE UP (ref 3.5–5.3)
RBC # BLD: 3.22 M/UL — LOW (ref 4.2–5.8)
RBC # FLD: 21.5 % — HIGH (ref 10.3–14.5)
SODIUM SERPL-SCNC: 129 MMOL/L — LOW (ref 135–145)
WBC # BLD: 24.34 K/UL — HIGH (ref 3.8–10.5)
WBC # FLD AUTO: 24.34 K/UL — HIGH (ref 3.8–10.5)

## 2024-10-05 PROCEDURE — 99233 SBSQ HOSP IP/OBS HIGH 50: CPT

## 2024-10-05 PROCEDURE — 99222 1ST HOSP IP/OBS MODERATE 55: CPT

## 2024-10-05 RX ORDER — FOLIC ACID 1 MG/1
1 TABLET ORAL DAILY
Refills: 0 | Status: DISCONTINUED | OUTPATIENT
Start: 2024-10-05 | End: 2024-10-14

## 2024-10-05 RX ADMIN — PANTOPRAZOLE SODIUM 40 MILLIGRAM(S): 40 TABLET, DELAYED RELEASE ORAL at 06:16

## 2024-10-05 RX ADMIN — APIXABAN 5 MILLIGRAM(S): 5 TABLET, FILM COATED ORAL at 17:48

## 2024-10-05 RX ADMIN — Medication 100 MILLIGRAM(S): at 13:03

## 2024-10-05 RX ADMIN — APIXABAN 5 MILLIGRAM(S): 5 TABLET, FILM COATED ORAL at 06:16

## 2024-10-05 RX ADMIN — Medication 81 MILLIGRAM(S): at 12:25

## 2024-10-05 RX ADMIN — FOLIC ACID 1 MILLIGRAM(S): 1 TABLET ORAL at 12:24

## 2024-10-05 RX ADMIN — Medication 25 MILLIGRAM(S): at 06:16

## 2024-10-05 RX ADMIN — ATORVASTATIN CALCIUM 10 MILLIGRAM(S): 10 TABLET, FILM COATED ORAL at 22:26

## 2024-10-05 NOTE — PROGRESS NOTE ADULT - ASSESSMENT
69-year-old male w/ PMH of HTN, HLD, T2DM, CAD and metastatic urothelial CA (follows with [facility]) c/b tumor invasion and hydronephrosis, and recent admission for YANIRA on CKD that improved with Elias catheter placement presenting for mild confusion and fatigue since discharge. In the ED. patient was afebrile but tachycardic to 102. Labs significant for WBC of 25.43 and UA indeterminate. Patient admitted for AMS iso likely sepsis 2/2 UTI.

## 2024-10-05 NOTE — PROGRESS NOTE ADULT - ASSESSMENT
Metastatic urothelial cancer  --Previously on gem carbo x 6 through 12/2023 -> avelumab since 2/2024 then held since 6/18/24 due to CRI, anemia, dizziness. PET 7/2024 shows PD at multiple sites.  Started treatment with enfortumab vedotin LD 9/13/24.  --[place] [facility] [place]  --No plan for treatment while admitted    AMS (resolved)  Leukocytosis  --2/2 UTI, on IV abx (--10/15)  --blood and urine cultures NTD  --CT a/p w/ emphysematous cystitis, ID following    hx Hydronephrosis/ YANIRA  --Cr 1.59 on admission, Elias removed.  --2/2 disease burden  --CT a/p 9/24/24: Similar size of dominant 7 cm left renal mass. Increased size of right hepatic lobe lesions. Increased and decreased size of left perinephric masses. Decreased size of left para-aortic lymph node. 0.4 cm distal right ureteral stone, with proximal hydroureter and perivesicular fat stranding.  --repeat CT a/p w/ Emphysematous cystitis. Left renal mass with associated hydronephrosis similar to prior exam. Interval increase in size of one of the perinephric soft tissue implant    Anemia   --Multifactorial d/t malignancy, treatment, CKD  --Hgb 7.5-9.5 at baseline  --Transfuse for Hgb <7, 8 if symptomatic  --folate low (4.4), %sat c/w KE (12)--> folic acid ordered, consider PO iron    hx DVT  --c/w Eliquis    will follow,    Samaria Dangelo NP  Hematology/ Oncology  [facility]  [phone] (office)  [phone] (alt office)  Evenings and weekends please call MD on call or office

## 2024-10-05 NOTE — PROGRESS NOTE ADULT - PROBLEM SELECTOR PLAN 3
Patient with metastatic urothelial cancer, follows with [facility], c/b tumor invasion of L ureter and hydronephrosis. On chemotherapy, missed last session on Friday.   - f/u heme/onc consulted: no inpatient tx at this time   - Urology consulted, worsening Scr, and recent nolen removal outpt

## 2024-10-05 NOTE — PROGRESS NOTE ADULT - PROBLEM SELECTOR PLAN 1
On admission, patient was confused, and tachycardic with WBC count of 25. He was recently admitted for YANIRA on CKD, s/p Elias placement. On Zosyn for sepsis likely 2/2 UTI given mental status and recent Elias placement. UA neg. Hemodynamically stable, afebrile. No abdominal tenderness on exa  - CT abd/pelvis -> emphysematous cystitis   - lactate normalized, blood/ urine cx neg   - s/p Zosyn  - c/w Ceftriaxone 1gm q24hr as per ID   - Bp stable started home metoprolol 25 mg BID w/ hold parameters  - metabolic encephalopathy now resolved AAOx4

## 2024-10-05 NOTE — PROGRESS NOTE ADULT - SUBJECTIVE AND OBJECTIVE BOX
Patient is a 69y old  Male who presents with a chief complaint of Disorientation    Per chart, patient is a 68 y/o male with PMH including HTN, HLD, T2DM, CAD, metastatic urothelial CA (follows w/ [facility]) c/b tumor invasion and hydronephrosis, h/o recent admission for YANIRA on CKD. Patient presents to [facility] w/ mild confusion, fatigue since recent discharge. Admitted w/ AMS in setting of likely sepsis 2/2 UTI per MD. Started on antibiotic regimen by team. (04 Oct 2024 17:09)    Patient seen and examined  Appears comfortable, no new complaints offered    MEDICATIONS  (STANDING):  apixaban 5 milliGRAM(s) Oral every 12 hours  aspirin  chewable 81 milliGRAM(s) Oral daily  atorvastatin 10 milliGRAM(s) Oral at bedtime  cefTRIAXone   IVPB 1000 milliGRAM(s) IV Intermittent every 24 hours  dextrose 5%. 1000 milliLiter(s) (50 mL/Hr) IV Continuous <Continuous>  dextrose 5%. 1000 milliLiter(s) (100 mL/Hr) IV Continuous <Continuous>  dextrose 50% Injectable 25 Gram(s) IV Push once  dextrose 50% Injectable 25 Gram(s) IV Push once  dextrose 50% Injectable 12.5 Gram(s) IV Push once  glucagon  Injectable 1 milliGRAM(s) IntraMuscular once  influenza  Vaccine (HIGH DOSE) 0.5 milliLiter(s) IntraMuscular once  insulin lispro (ADMELOG) corrective regimen sliding scale   SubCutaneous three times a day before meals  insulin lispro (ADMELOG) corrective regimen sliding scale   SubCutaneous at bedtime  metoprolol tartrate 25 milliGRAM(s) Oral two times a day  pantoprazole    Tablet 40 milliGRAM(s) Oral before breakfast    MEDICATIONS  (PRN):  dextrose Oral Gel 15 Gram(s) Oral once PRN Blood Glucose LESS THAN 70 milliGRAM(s)/deciliter      Vital Signs Last 24 Hrs  T(C): 36.5 (05 Oct 2024 08:01), Max: 37.8 (04 Oct 2024 16:07)  T(F): 97.7 (05 Oct 2024 08:01), Max: 100 (04 Oct 2024 16:07)  HR: 82 (05 Oct 2024 08:01) (82 - 109)  BP: 104/70 (05 Oct 2024 08:01) (104/70 - 122/69)  BP(mean): --  RR: 18 (05 Oct 2024 08:01) (18 - 18)  SpO2: 96% (05 Oct 2024 08:01) (94% - 99%)    Parameters below as of 05 Oct 2024 08:01  Patient On (Oxygen Delivery Method): room air      PE  Awake, alert  Anicteric, MMM  RRR  CTAB  Abd soft, NT, ND  No c/c                        7.8    24.34 )-----------( 377      ( 05 Oct 2024 07:10 )             27.1       10-05    129[L]  |  97  |  24[H]  ----------------------------<  109[H]  3.9   |  18[L]  |  1.88[H]    Ca    10.1      05 Oct 2024 07:10  Phos  3.0     10-05  Mg     1.6     10-05

## 2024-10-05 NOTE — PROGRESS NOTE ADULT - SUBJECTIVE AND OBJECTIVE BOX
SUBJECTIVE / OVERNIGHT EVENTS:  Today is hospital day 3d. There are no new issues or overnight events.   Did not report any lightheadedness, vertigo, shortness of breathe, chest pain, palpitations, vomiting, diarrhea currently    HPI:  Mr. Derek Vila is a 69-year-old male w/ PMH of HTN, HLD, T2DM, CAD and [place] [facility] c/b [place] invasion and hydronephrosis, and recent admission for YANIRA on CKD that improved with Elias catheter placement presenting for mild confusion and fatigue since discharge. Patient [place] [facility] [place] UA was obtained c/f UTI and patient was instructed to present to the ED. Patient otherwise denies fever, chills, chest pain, SOB, nausea, vomiting, diarrhea, constipation, or dysuria. In the ED. patient was afebrile but tachycardic to 102. Labs significant for WBC of 25.43 and UA indeterminate. RVP and CXR unremarkable. Patient admitted for AMS iso likely sepsis 2/2 UTI. (02 Oct 2024 21:44)    MEDICATIONS  (STANDING):  apixaban 5 milliGRAM(s) Oral every 12 hours  aspirin  chewable 81 milliGRAM(s) Oral daily  atorvastatin 10 milliGRAM(s) Oral at bedtime  cefTRIAXone   IVPB 1000 milliGRAM(s) IV Intermittent every 24 hours  dextrose 5%. 1000 milliLiter(s) (100 mL/Hr) IV Continuous <Continuous>  dextrose 5%. 1000 milliLiter(s) (50 mL/Hr) IV Continuous <Continuous>  dextrose 50% Injectable 25 Gram(s) IV Push once  dextrose 50% Injectable 25 Gram(s) IV Push once  dextrose 50% Injectable 12.5 Gram(s) IV Push once  folic acid 1 milliGRAM(s) Oral daily  glucagon  Injectable 1 milliGRAM(s) IntraMuscular once  influenza  Vaccine (HIGH DOSE) 0.5 milliLiter(s) IntraMuscular once  insulin lispro (ADMELOG) corrective regimen sliding scale   SubCutaneous at bedtime  insulin lispro (ADMELOG) corrective regimen sliding scale   SubCutaneous three times a day before meals  metoprolol tartrate 25 milliGRAM(s) Oral two times a day  pantoprazole    Tablet 40 milliGRAM(s) Oral before breakfast    MEDICATIONS  (PRN):  dextrose Oral Gel 15 Gram(s) Oral once PRN Blood Glucose LESS THAN 70 milliGRAM(s)/deciliter    HOME MEDICATIONS:  atorvastatin 20 mg oral tablet: 1 tab(s) orally once a day- IN AM  Eliquis 5 mg oral tablet: 1 tab(s) orally 2 times a day  magnesium oxide 400 mg oral capsule: 1 cap(s) orally once a day  omeprazole 40 mg oral delayed release capsule: 1 cap(s) orally once a day    PHYSICAL EXAM  Vital Signs Last 24 Hrs  T(C): 36.7 (05 Oct 2024 12:01), Max: 37.8 (04 Oct 2024 16:07)  T(F): 98.1 (05 Oct 2024 12:01), Max: 100 (04 Oct 2024 16:07)  HR: 88 (05 Oct 2024 12:01) (82 - 109)  BP: 109/69 (05 Oct 2024 12:01) (104/70 - 113/74)  BP(mean): --  RR: 20 (05 Oct 2024 12:01) (18 - 20)  SpO2: 98% (05 Oct 2024 12:01) (94% - 99%)    Parameters below as of 05 Oct 2024 12:01  Patient On (Oxygen Delivery Method): room air        10-04-24 @ 07:01  -  10-05-24 @ 07:00  --------------------------------------------------------  IN: 720 mL / OUT: 1450 mL / NET: -730 mL    10-05-24 @ 07:01  -  10-05-24 @ 15:02  --------------------------------------------------------  IN: 480 mL / OUT: 0 mL / NET: 480 mL      CONSTITUTIONAL: Well-groomed, in no apparent distress;  EYES: No conjunctival or scleral injection, non-icteric;  ENMT: No external nasal lesions; Normal outer ears;  NECK: Trachea midline;  RESPIRATORY: Normal respiratory effort;  CARDIOVASCULAR: Regular rate and rhythm;  GASTROINTESTINAL: Non-distended;   EXTREMITIES:  No lower extremity edema;  NEUROLOGY: Does respond to commands appropriately;  PSYCHIATRY: Mood and Affect appropriate    LABS:                        7.8    24.34 )-----------( 377      ( 05 Oct 2024 07:10 )             27.1     10-05    129[L]  |  97  |  24[H]  ----------------------------<  109[H]  3.9   |  18[L]  |  1.88[H]    Ca    10.1      05 Oct 2024 07:10  Phos  3.0     10-05  Mg     1.6     10-05            Urinalysis Basic - ( 05 Oct 2024 07:10 )    Color: x / Appearance: x / SG: x / pH: x  Gluc: 109 mg/dL / Ketone: x  / Bili: x / Urobili: x   Blood: x / Protein: x / Nitrite: x   Leuk Esterase: x / RBC: x / WBC x   Sq Epi: x / Non Sq Epi: x / Bacteria: x        Culture - Urine (collected 02 Oct 2024 17:45)  Source: Clean Catch Clean Catch (Midstream)  Final Report (04 Oct 2024 03:53):    No growth    Culture - Blood (collected 02 Oct 2024 15:50)  Source: .Blood BLOOD  Preliminary Report (04 Oct 2024 23:01):    No growth at 48 Hours    Culture - Blood (collected 02 Oct 2024 15:35)  Source: .Blood BLOOD  Preliminary Report (04 Oct 2024 23:01):    No growth at 48 Hours          RADIOLOGY & ADDITIONAL TESTS:  EKG  12 Lead ECG:   Ventricular Rate 83 BPM    Atrial Rate 83 BPM    P-R Interval 180 ms    QRS Duration 92 ms    Q-T Interval 388 ms    QTC Calculation(Bazett) 455 ms    P Axis -28 degrees    R Axis -20 degrees    T Axis -19 degrees    Diagnosis Line NORMAL SINUS RHYTHM  NORMAL ECG  WHEN COMPARED WITH ECG OF 07-AUG-2024 10:08,  SIGNIFICANT CHANGES HAVE OCCURRED  Confirmed by MD LUIS, LATRICE ([id number]) on 9/26/2024 6:14:02 PM (09-24-24 @ 17:40)  12 Lead ECG:   Ventricular Rate 91 BPM    Atrial Rate 91 BPM    P-R Interval 176 ms    QRS Duration 86 ms    Q-T Interval 366 ms    QTC Calculation(Bazett) 450 ms    P Axis 59 degrees    R Axis -21 degrees    T Axis 13 degrees    Diagnosis Line NORMAL SINUS RHYTHM  NORMAL ECG  WHEN COMPARED WITH ECG OF 29-JUL-2003 16:15,  SIGNIFICANT CHANGES HAVE OCCURRED  Confirmed by MD FARZANEH, NORIS ([id number]) on 8/7/2024 2:35:25 PM (08-07-24 @ 10:08)    Xray Chest 1 View- PORTABLE-Urgent:   ACC: [id number] EXAM:  XR CHEST PORTABLE URGENT 1V   ORDERED BY: SHERLYN VILLALTA     PROCEDURE DATE:  09/25/2024          INTERPRETATION:  EXAMINATION: XR CHEST URGENT    CLINICAL INDICATION: Leukocytosis and acute renal failure.    TECHNIQUE: 2 frontal images of the chest from 9/25/2024 2:03 AM    COMPARISON: Chest x-ray dated 8/7/2024.    FINDINGS:  Screw overlying the left glenoid, likely corresponds to prior Latarjet   procedure.  The heart size is normal.  The lungs are clear.  There is no pneumothorax or pleural effusion.    IMPRESSION:  Clear lungs.    --- End of Report ---           SAMY RENDON MD; Resident Radiologist  This document has been electronically signed.  LIU PEREIRA MD; Attending Radiologist  This document has been electronically signed. Sep 25 2024 10:33AM (09-25-24 @ 02:03)  CT Head No Cont:   ACC: [id number] EXAM:  CT BRAIN   ORDERED BY: SHERLYN VILLALTA     PROCEDURE DATE:  09/25/2024          INTERPRETATION:  CLINICAL INFORMATION: Evaluate for acute process.   Reported history of DVT on apixaban. Acute kidney injury.    COMPARISON: None.    CONTRAST:  IV Contrast: NONE  Complications: None reported at time of study completion    TECHNIQUE:  Serial axial images were obtained from the skull base to the   vertex using multi-slice helical technique. Sagittal and coronal   reformats were obtained.    FINDINGS:    VENTRICLES AND SULCI: Age appropriate involutional changes.  INTRA-AXIAL: No mass effect, acute hemorrhage, or midline shift.  There   are periventricular and subcortical white matter hypodensities,   consistent with microvascular type changes. Gray-white matter   differentiation is observed.  EXTRA-AXIAL: No mass or fluid collection. Basal cisterns are normal in   appearance.    VISUALIZED SINUSES:  Right maxillary sinus mucus retention cyst versus   polyp.  TYMPANOMASTOID CAVITIES:  Clear.  VISUALIZED ORBITS: Bilateral lens replacement.  CALVARIUM: Intact.    MISCELLANEOUS: None.      IMPRESSION:  No acute intracranial hemorrhage, mass effect, or evidence of acute   vascular territorial infarction. If clinical symptomspersist or worsen,   more sensitive evaluation with brain MRI may be obtained, if no   contraindications exist.    Findings discussed with Dr. Villalta  by Dr. Junior on 9/25/2024 1:38 AM with   read back confirmation.    --- End of Report ---          LIONEL JUNIOR MD; Resident Radiologist  This document has been electronically signed.  ANALI ELDER MD; Attending Radiologist  This document has been electronically signed. Sep 25 2024  6:32AM (09-25-24 @ 00:44)

## 2024-10-06 LAB
ANION GAP SERPL CALC-SCNC: 13 MMOL/L — SIGNIFICANT CHANGE UP (ref 5–17)
BUN SERPL-MCNC: 22 MG/DL — SIGNIFICANT CHANGE UP (ref 7–23)
CALCIUM SERPL-MCNC: 10.4 MG/DL — SIGNIFICANT CHANGE UP (ref 8.4–10.5)
CHLORIDE SERPL-SCNC: 95 MMOL/L — LOW (ref 96–108)
CO2 SERPL-SCNC: 22 MMOL/L — SIGNIFICANT CHANGE UP (ref 22–31)
CREAT SERPL-MCNC: 1.56 MG/DL — HIGH (ref 0.5–1.3)
EGFR: 47 ML/MIN/1.73M2 — LOW
GLUCOSE BLDC GLUCOMTR-MCNC: 125 MG/DL — HIGH (ref 70–99)
GLUCOSE BLDC GLUCOMTR-MCNC: 131 MG/DL — HIGH (ref 70–99)
GLUCOSE BLDC GLUCOMTR-MCNC: 138 MG/DL — HIGH (ref 70–99)
GLUCOSE BLDC GLUCOMTR-MCNC: 146 MG/DL — HIGH (ref 70–99)
GLUCOSE SERPL-MCNC: 109 MG/DL — HIGH (ref 70–99)
HCT VFR BLD CALC: 24.2 % — LOW (ref 39–50)
HGB BLD-MCNC: 7.3 G/DL — LOW (ref 13–17)
MAGNESIUM SERPL-MCNC: 1.7 MG/DL — SIGNIFICANT CHANGE UP (ref 1.6–2.6)
MCHC RBC-ENTMCNC: 24.2 PG — LOW (ref 27–34)
MCHC RBC-ENTMCNC: 30.2 GM/DL — LOW (ref 32–36)
MCV RBC AUTO: 80.1 FL — SIGNIFICANT CHANGE UP (ref 80–100)
NRBC # BLD: 0 /100 WBCS — SIGNIFICANT CHANGE UP (ref 0–0)
PHOSPHATE SERPL-MCNC: 2.9 MG/DL — SIGNIFICANT CHANGE UP (ref 2.5–4.5)
PLATELET # BLD AUTO: 439 K/UL — HIGH (ref 150–400)
POTASSIUM SERPL-MCNC: 4 MMOL/L — SIGNIFICANT CHANGE UP (ref 3.5–5.3)
POTASSIUM SERPL-SCNC: 4 MMOL/L — SIGNIFICANT CHANGE UP (ref 3.5–5.3)
RBC # BLD: 3.02 M/UL — LOW (ref 4.2–5.8)
RBC # FLD: 21.2 % — HIGH (ref 10.3–14.5)
SODIUM SERPL-SCNC: 130 MMOL/L — LOW (ref 135–145)
WBC # BLD: 30.6 K/UL — HIGH (ref 3.8–10.5)
WBC # FLD AUTO: 30.6 K/UL — HIGH (ref 3.8–10.5)

## 2024-10-06 PROCEDURE — 99232 SBSQ HOSP IP/OBS MODERATE 35: CPT

## 2024-10-06 RX ADMIN — Medication 100 MILLIGRAM(S): at 12:55

## 2024-10-06 RX ADMIN — Medication 81 MILLIGRAM(S): at 10:56

## 2024-10-06 RX ADMIN — PANTOPRAZOLE SODIUM 40 MILLIGRAM(S): 40 TABLET, DELAYED RELEASE ORAL at 06:22

## 2024-10-06 RX ADMIN — Medication 25 MILLIGRAM(S): at 18:18

## 2024-10-06 RX ADMIN — FOLIC ACID 1 MILLIGRAM(S): 1 TABLET ORAL at 10:55

## 2024-10-06 RX ADMIN — APIXABAN 5 MILLIGRAM(S): 5 TABLET, FILM COATED ORAL at 06:23

## 2024-10-06 RX ADMIN — Medication 25 MILLIGRAM(S): at 06:22

## 2024-10-06 RX ADMIN — APIXABAN 5 MILLIGRAM(S): 5 TABLET, FILM COATED ORAL at 18:18

## 2024-10-06 RX ADMIN — ATORVASTATIN CALCIUM 10 MILLIGRAM(S): 10 TABLET, FILM COATED ORAL at 21:20

## 2024-10-06 NOTE — DISCHARGE NOTE PROVIDER - HOSPITAL COURSE
HPI:  Mr. Derek Vila is a 69-year-old male w/ PMH of HTN, HLD, T2DM, CAD and metastatic [place]ial [place] [facility]/b [place] invasion and hydronephrosis, and recent admission for YANIRA on CKD that improved with Elias catheter placement presenting for mild confusion and fatigue since discharge. Patient [place] [facility] [place] UA was obtained c/f UTI and patient was instructed to present to the ED. Patient otherwise denies fever, chills, chest pain, SOB, nausea, vomiting, diarrhea, constipation, or dysuria. In the ED. patient was afebrile but tachycardic to 102. Labs significant for WBC of 25.43 and UA indeterminate. RVP and CXR unremarkable. Patient admitted for AMS iso likely sepsis 2/2 UTI. (02 Oct 2024 21:44)    Hospital Course:  Sepsis   -On admission, patient was confused, and tachycardic with WBC count of 25. He was recently admitted for YANIRA on CKD, s/p Elias placement. On Zosyn for sepsis likely 2/2 UTI given mental status and recent Elias placement. UA neg. Hemodynamically stable, afebrile. No abdominal tenderness on exam.  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  -transition to cefpodoxime 200mg PO Q12h to complete 14 days on 10/15    HLD (hyperlipidemia).   - Continue home medication Atorvastatin 10mg qhs, ASA 81 mg qd.    Urothelial cancer  -Patient with metastatic urothelial cancer, [place] with [facility] [place], c/b tumor invasion of L ureter and hydronephrosis. On chemotherapy, missed last session on Friday.   -  heme/onc following: no inpatient tx at this time   - Urology following: No urologic intervention. Continue antibiotics per ID   - Continue outpatient follow up at [facility] for metastatic urothelial cancer treatment     DVT, lower extremity.   -Heme/onc recommended continuing AC in light of active malignancy.  - Continue with Eliquis 5mg bid.    T2DM (type 2 diabetes mellitus)  -c/w home regimen      Important Medication Changes and Reason:  -Start Cefpodoxime 200mg q12hrs to complete on 10/15    Active or Pending Issues Requiring Follow-up:  -Continue outpatient follow up at [facility] for metastatic urothelial cancer treatment    Advanced Directives:   [x] Full code  [ ] DNR  [ ] Hospice    Discharge Diagnoses:  Sepsis  HLD  Urothelial cancer  Hx of DVT  T2DM       HPI:  Mr. Derek Vila is a 69-year-old male w/ PMH of HTN, HLD, T2DM, CAD and metastatic urothelial CA (follows with [facility]) c/b tumor invasion and hydronephrosis, and recent admission for YANIRA on CKD that improved with Elias catheter placement presenting for mild confusion and fatigue since discharge. Patient reportedly went to appt at [facility] where UA was obtained c/f UTI and patient was instructed to present to the ED. Patient otherwise denies fever, chills, chest pain, SOB, nausea, vomiting, diarrhea, constipation, or dysuria. In the ED. patient was afebrile but tachycardic to 102. Labs significant for WBC of 25.43 and UA indeterminate. RVP and CXR unremarkable. Patient admitted for AMS iso likely sepsis 2/2 UTI. (02 Oct 2024 21:44)    Hospital Course:  Sepsis   -On admission, patient was confused, and tachycardic with WBC count of 25. He was recently admitted for YANIRA on CKD, s/p Elias placement. On Zosyn for sepsis likely 2/2 UTI given mental status and recent Elias placement. UA neg. Hemodynamically stable, afebrile. No abdominal tenderness on exam.  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  -transition to cefpodoxime 200mg PO Q12h to complete 14 days on 10/15    HLD (hyperlipidemia).   - Continue home medication Atorvastatin 10mg qhs, ASA 81 mg qd.    Urothelial cancer  -Patient with metastatic [place]rothelial cancer[place] [facility] [place] invasion of L ureter and hydronephrosis. On chemotherapy, missed last session on Friday.   -  heme/onc following: no inpatient tx at this time   - Urology following: No urologic intervention. Continue antibiotics per ID   - [place] [facility] [place]     DVT, lower extremity.   -Heme/onc recommended continuing AC in light of active malignancy.  - Continue with Eliquis 5mg bid.    T2DM (type 2 diabetes mellitus)  -c/w home regimen    Discharge planning discussed with attending Dr Angelo. Patient is medically cleared and stable for discharge home with home PT. Medication Reconciliation reviewed with attending.    Important Medication Changes and Reason:  -Start Cefpodoxime 200mg q12hrs to complete on 10/15    Active or Pending Issues Requiring Follow-up:  -[place] [facility] [place]  -PCP    Advanced Directives:   [x] Full code  [ ] DNR  [ ] Hospice    Discharge Diagnoses:  Sepsis  HLD  Urothelial cancer  Hx of DVT  T2DM

## 2024-10-06 NOTE — DISCHARGE NOTE PROVIDER - PROVIDER TOKENS
PROVIDER:[TOKEN:[[id number]],ESTABLISHEDPATIENT:[T]] PROVIDER:[TOKEN:[[id number]],FOLLOWUP:[1 week],ESTABLISHEDPATIENT:[T]],FREE:[LAST:[[place]],PHONE:[(   )    -],FAX:[(   )    -],ADDRESS:[[facility]FOLLOWUP:[Routine],ESTABLISHEDPATIENT:[T]]

## 2024-10-06 NOTE — PROGRESS NOTE ADULT - SUBJECTIVE AND OBJECTIVE BOX
Patient is a 69y old  Male who presents with a chief complaint of AMS (05 Oct 2024 15:01)    Patient seen and examined  Appears comfortable, no new complaints  No acute overnight events reported     MEDICATIONS  (STANDING):  apixaban 5 milliGRAM(s) Oral every 12 hours  aspirin  chewable 81 milliGRAM(s) Oral daily  atorvastatin 10 milliGRAM(s) Oral at bedtime  cefTRIAXone   IVPB 1000 milliGRAM(s) IV Intermittent every 24 hours  dextrose 5%. 1000 milliLiter(s) (100 mL/Hr) IV Continuous <Continuous>  dextrose 5%. 1000 milliLiter(s) (50 mL/Hr) IV Continuous <Continuous>  dextrose 50% Injectable 25 Gram(s) IV Push once  dextrose 50% Injectable 12.5 Gram(s) IV Push once  dextrose 50% Injectable 25 Gram(s) IV Push once  folic acid 1 milliGRAM(s) Oral daily  glucagon  Injectable 1 milliGRAM(s) IntraMuscular once  influenza  Vaccine (HIGH DOSE) 0.5 milliLiter(s) IntraMuscular once  insulin lispro (ADMELOG) corrective regimen sliding scale   SubCutaneous at bedtime  insulin lispro (ADMELOG) corrective regimen sliding scale   SubCutaneous three times a day before meals  metoprolol tartrate 25 milliGRAM(s) Oral two times a day  pantoprazole    Tablet 40 milliGRAM(s) Oral before breakfast    MEDICATIONS  (PRN):  dextrose Oral Gel 15 Gram(s) Oral once PRN Blood Glucose LESS THAN 70 milliGRAM(s)/deciliter      Vital Signs Last 24 Hrs  T(C): 36.9 (06 Oct 2024 08:01), Max: 37.8 (06 Oct 2024 00:37)  T(F): 98.5 (06 Oct 2024 08:01), Max: 100.1 (06 Oct 2024 00:37)  HR: 96 (06 Oct 2024 08:01) (88 - 101)  BP: 108/72 (06 Oct 2024 08:01) (96/58 - 119/75)  BP(mean): --  RR: 20 (06 Oct 2024 08:01) (18 - 20)  SpO2: 97% (06 Oct 2024 08:01) (94% - 98%)    Parameters below as of 06 Oct 2024 08:01  Patient On (Oxygen Delivery Method): room air        PE  Awake, alert  Anicteric, MMM  RRR  CTAB  Abd soft, NT, ND  No c/c                        7.3    30.60 )-----------( 439      ( 06 Oct 2024 07:17 )             24.2       10-06    130[L]  |  95[L]  |  22  ----------------------------<  109[H]  4.0   |  22  |  1.56[H]    Ca    10.4      06 Oct 2024 07:21  Phos  2.9     10-06  Mg     1.7     10-06

## 2024-10-06 NOTE — DISCHARGE NOTE PROVIDER - CARE PROVIDER_API CALL
Charly Sena   Internal Medicine  [address] [place] [id number]  Phone: [phone]  Fax: [phone]  Established Patient  Follow Up Time:    Charly Sena   Internal Medicine  [address] [place] [address]  Phone: [phone]  Fax: [phone]  Established Patient  Follow Up Time: 1 week    ASHLYN Herring  Phone: (   )    -  Fax: (   )    -  Established Patient  Follow Up Time: Routine

## 2024-10-06 NOTE — PROGRESS NOTE ADULT - SUBJECTIVE AND OBJECTIVE BOX
[facility] DIVISION of INFECTIOUS DISEASE  Michael Clements MD PhD, Meseret Gardner MD, Carol Pineda MD, Talib Brooke MD, Chaz Ramos MD  and providing coverage with Elsa Edmond MD  Providing Infectious Disease Consultations at [facility], [facility], [facility], [facility]    Office# [phone] to schedule follow up appointments  Answering Service for urgent calls or New Consults [phone]  Cell# to text for urgent issues Michael Clements [phone]     infectious diseases progress note:    JESSIE GREER is a 69y y. o. Male patient    Overnight and events of the last 24hrs reviewed    Allergies    No Known Allergies    Intolerances        ANTIBIOTICS/RELEVANT:  antimicrobials  cefTRIAXone   IVPB 1000 milliGRAM(s) IV Intermittent every 24 hours    immunologic:  influenza  Vaccine (HIGH DOSE) 0.5 milliLiter(s) IntraMuscular once    OTHER:  apixaban 5 milliGRAM(s) Oral every 12 hours  aspirin  chewable 81 milliGRAM(s) Oral daily  atorvastatin 10 milliGRAM(s) Oral at bedtime  dextrose 5%. 1000 milliLiter(s) IV Continuous <Continuous>  dextrose 5%. 1000 milliLiter(s) IV Continuous <Continuous>  dextrose 50% Injectable 25 Gram(s) IV Push once  dextrose 50% Injectable 12.5 Gram(s) IV Push once  dextrose 50% Injectable 25 Gram(s) IV Push once  dextrose Oral Gel 15 Gram(s) Oral once PRN  folic acid 1 milliGRAM(s) Oral daily  glucagon  Injectable 1 milliGRAM(s) IntraMuscular once  insulin lispro (ADMELOG) corrective regimen sliding scale   SubCutaneous three times a day before meals  insulin lispro (ADMELOG) corrective regimen sliding scale   SubCutaneous at bedtime  metoprolol tartrate 25 milliGRAM(s) Oral two times a day  pantoprazole    Tablet 40 milliGRAM(s) Oral before breakfast      Objective:  Vital Signs Last 24 Hrs  T(C): 36.7 (06 Oct 2024 16:17), Max: 37.8 (06 Oct 2024 00:37)  T(F): 98 (06 Oct 2024 16:17), Max: 100.1 (06 Oct 2024 00:37)  HR: 92 (06 Oct 2024 16:17) (92 - 101)  BP: 108/71 (06 Oct 2024 16:17) (108/71 - 119/75)  BP(mean): --  RR: 20 (06 Oct 2024 16:17) (18 - 20)  SpO2: 99% (06 Oct 2024 16:17) (96% - 99%)    Parameters below as of 06 Oct 2024 16:17  Patient On (Oxygen Delivery Method): room air        T(C): 36.7 (10-06-24 @ 16:17), Max: 37.8 (10-06-24 @ 00:37)  T(C): 36.7 (10-06-24 @ 16:17), Max: 37.8 (10-04-24 @ 16:07)  T(C): 36.7 (10-06-24 @ 16:17), Max: 37.8 (10-04-24 @ 16:07)    PHYSICAL EXAM:  HEENT: NC atraumatic  Neck: supple  Respiratory: no accessory muscle use, breathing comfortably  Cardiovascular: distant  Gastrointestinal: normal appearing, nondistended  Extremities: no clubbing, no cyanosis,        LABS:                          7.3    30.60 )-----------( 439      ( 06 Oct 2024 07:17 )             24.2       WBC  30.60 10-06 @ 07:17  24.34 10-05 @ 07:10  26.70 10-04 @ 19:29  23.47 10-04 @ 07:17  22.14 10-03 @ 07:23  25.43 10-02 @ 17:03      10-06    130[L]  |  95[L]  |  22  ----------------------------<  109[H]  4.0   |  22  |  1.56[H]    Ca    10.4      06 Oct 2024 07:21  Phos  2.9     10-06  Mg     1.7     10-06        Creatinine: 1.56 mg/dL (10-06-24 @ 07:21)  Creatinine: 1.88 mg/dL (10-05-24 @ 07:10)  Creatinine: 1.87 mg/dL (10-04-24 @ 07:16)  Creatinine: 1.58 mg/dL (10-03-24 @ 07:21)  Creatinine: 1.59 mg/dL (10-02-24 @ 16:31)        Urinalysis Basic - ( 06 Oct 2024 07:21 )    Color: x / Appearance: x / SG: x / pH: x  Gluc: 109 mg/dL / Ketone: x  / Bili: x / Urobili: x   Blood: x / Protein: x / Nitrite: x   Leuk Esterase: x / RBC: x / WBC x   Sq Epi: x / Non Sq Epi: x / Bacteria: x            INFLAMMATORY MARKERS      MICROBIOLOGY:  Culture - Urine (10.02.24 @ 17:45)    Specimen Source: Clean Catch Clean Catch (Midstream)   Culture Results:   No growth                RADIOLOGY & ADDITIONAL STUDIES:

## 2024-10-06 NOTE — DISCHARGE NOTE PROVIDER - DETAILS OF MALNUTRITION DIAGNOSIS/DIAGNOSES
This patient has been assessed with a concern for Malnutrition and was treated during this hospitalization for the following Nutrition diagnosis/diagnoses:     -  10/04/2024: Severe protein-calorie malnutrition

## 2024-10-06 NOTE — PROGRESS NOTE ADULT - ASSESSMENT
Metastatic urothelial cancer  --Previously on gem carbo x 6 through 12/2023 -> avelumab since 2/2024 then held since 6/18/24 due to CRI, anemia, dizziness. PET 7/2024 shows PD at multiple sites.  Started treatment with enfortumab vedotin LD 9/13/24.  --[place] [facility] [place]  --No plan for treatment while admitted    AMS (resolved)  Leukocytosis (up trending)  --2/2 UTI, on IV abx (--10/15)  --blood and urine cultures NTD, will recommend repeating BC  --CT a/p w/ emphysematous cystitis, ID following    hx Hydronephrosis/ YANIRA  --Cr 1.59 on admission, Elias removed.  --2/2 disease burden  --CT a/p 9/24/24: Similar size of dominant 7 cm left renal mass. Increased size of right hepatic lobe lesions. Increased and decreased size of left perinephric masses. Decreased size of left para-aortic lymph node. 0.4 cm distal right ureteral stone, with proximal hydroureter and perivesicular fat stranding.  --repeat CT a/p w/ Emphysematous cystitis. Left renal mass with associated hydronephrosis similar to prior exam. Interval increase in size of one of the perinephric soft tissue implant    Anemia   --Multifactorial d/t malignancy, treatment, CKD  --Hgb 7.5-9.5 at baseline  --Transfuse for Hgb <7, 8 if symptomatic  --folate low (4.4), %sat c/w KE (12)--> folic acid ordered, consider PO iron    hx DVT  --c/w Eliquis    will follow,    Samaria Dangelo NP  Hematology/ Oncology  [facility]  [phone] (office)  [phone] (alt office)  Evenings and weekends please call MD on call or office

## 2024-10-06 NOTE — PROGRESS NOTE ADULT - SUBJECTIVE AND OBJECTIVE BOX
SUBJECTIVE / OVERNIGHT EVENTS:  Today is hospital day 4d. There are no new issues or overnight events.   Did not report any lightheadedness, vertigo, shortness of breathe, chest pain, palpitations, vomiting, diarrhea currently    HPI:  Mr. Derek Vila is a 69-year-old male w/ PMH of HTN, HLD, T2DM, CAD and metastatic urothelial CA (follows with [facility]) c/b tumor invasion and hydronephrosis, and recent admission for YANIRA on CKD that improved with Elias catheter placement presenting for mild confusion and fatigue since discharge. Patient reportedly went to appt at [facility] where UA was obtained c/f UTI and patient was instructed to present to the ED. Patient otherwise denies fever, chills, chest pain, SOB, nausea, vomiting, diarrhea, constipation, or dysuria. In the ED. patient was afebrile but tachycardic to 102. Labs significant for WBC of 25.43 and UA indeterminate. RVP and CXR unremarkable. Patient admitted for AMS iso likely sepsis 2/2 UTI. (02 Oct 2024 21:44)    MEDICATIONS  (STANDING):  apixaban 5 milliGRAM(s) Oral every 12 hours  aspirin  chewable 81 milliGRAM(s) Oral daily  atorvastatin 10 milliGRAM(s) Oral at bedtime  cefTRIAXone   IVPB 1000 milliGRAM(s) IV Intermittent every 24 hours  dextrose 5%. 1000 milliLiter(s) (50 mL/Hr) IV Continuous <Continuous>  dextrose 5%. 1000 milliLiter(s) (100 mL/Hr) IV Continuous <Continuous>  dextrose 50% Injectable 25 Gram(s) IV Push once  dextrose 50% Injectable 25 Gram(s) IV Push once  dextrose 50% Injectable 12.5 Gram(s) IV Push once  folic acid 1 milliGRAM(s) Oral daily  glucagon  Injectable 1 milliGRAM(s) IntraMuscular once  influenza  Vaccine (HIGH DOSE) 0.5 milliLiter(s) IntraMuscular once  insulin lispro (ADMELOG) corrective regimen sliding scale   SubCutaneous three times a day before meals  insulin lispro (ADMELOG) corrective regimen sliding scale   SubCutaneous at bedtime  metoprolol tartrate 25 milliGRAM(s) Oral two times a day  pantoprazole    Tablet 40 milliGRAM(s) Oral before breakfast    MEDICATIONS  (PRN):  dextrose Oral Gel 15 Gram(s) Oral once PRN Blood Glucose LESS THAN 70 milliGRAM(s)/deciliter    HOME MEDICATIONS:  atorvastatin 20 mg oral tablet: 1 tab(s) orally once a day- IN AM  Eliquis 5 mg oral tablet: 1 tab(s) orally 2 times a day  Home physical therapy: Evaluate and treat ;  ICD10 722.2 (herniated disc)  magnesium oxide 400 mg oral capsule: 1 cap(s) orally once a day  omeprazole 40 mg oral delayed release capsule: 1 cap(s) orally once a day    PHYSICAL EXAM  Vital Signs Last 24 Hrs  T(C): 37 (06 Oct 2024 12:09), Max: 37.8 (06 Oct 2024 00:37)  T(F): 98.6 (06 Oct 2024 12:09), Max: 100.1 (06 Oct 2024 00:37)  HR: 96 (06 Oct 2024 12:09) (89 - 101)  BP: 110/76 (06 Oct 2024 12:09) (96/58 - 119/75)  BP(mean): --  RR: 20 (06 Oct 2024 12:09) (18 - 20)  SpO2: 96% (06 Oct 2024 12:09) (94% - 97%)    Parameters below as of 06 Oct 2024 12:09  Patient On (Oxygen Delivery Method): room air        10-05-24 @ 07:01  -  10-06-24 @ 07:00  --------------------------------------------------------  IN: 555 mL / OUT: 400 mL / NET: 155 mL    10-06-24 @ 07:01  -  10-06-24 @ 16:16  --------------------------------------------------------  IN: 480 mL / OUT: 0 mL / NET: 480 mL      CONSTITUTIONAL: Well-groomed, in no apparent distress;  EYES: No conjunctival or scleral injection, non-icteric;  ENMT: No external nasal lesions; Normal outer ears;  NECK: Trachea midline;  RESPIRATORY: Normal respiratory effort;   CARDIOVASCULAR: Regular rate and rhythm;  GASTROINTESTINAL: Non-distended;   EXTREMITIES:  No lower extremity edema;  NEUROLOGY: Does respond to commands appropriately;  PSYCHIATRY: Mood and Affect appropriate    LABS:                        7.3    30.60 )-----------( 439      ( 06 Oct 2024 07:17 )             24.2     10-06    130[L]  |  95[L]  |  22  ----------------------------<  109[H]  4.0   |  22  |  1.56[H]    Ca    10.4      06 Oct 2024 07:21  Phos  2.9     10-06  Mg     1.7     10-06            Urinalysis Basic - ( 06 Oct 2024 07:21 )    Color: x / Appearance: x / SG: x / pH: x  Gluc: 109 mg/dL / Ketone: x  / Bili: x / Urobili: x   Blood: x / Protein: x / Nitrite: x   Leuk Esterase: x / RBC: x / WBC x   Sq Epi: x / Non Sq Epi: x / Bacteria: x            RADIOLOGY & ADDITIONAL TESTS:  EKG  12 Lead ECG:   Ventricular Rate 83 BPM    Atrial Rate 83 BPM    P-R Interval 180 ms    QRS Duration 92 ms    Q-T Interval 388 ms    QTC Calculation(Bazett) 455 ms    P Axis -28 degrees    R Axis -20 degrees    T Axis -19 degrees    Diagnosis Line NORMAL SINUS RHYTHM  NORMAL ECG  WHEN COMPARED WITH ECG OF 07-AUG-2024 10:08,  SIGNIFICANT CHANGES HAVE OCCURRED  Confirmed by MD LUIS, LATRICE ([id number]) on 9/26/2024 6:14:02 PM (09-24-24 @ 17:40)  12 Lead ECG:   Ventricular Rate 91 BPM    Atrial Rate 91 BPM    P-R Interval 176 ms    QRS Duration 86 ms    Q-T Interval 366 ms    QTC Calculation(Bazett) 450 ms    P Axis 59 degrees    R Axis -21 degrees    T Axis 13 degrees    Diagnosis Line NORMAL SINUS RHYTHM  NORMAL ECG  WHEN COMPARED WITH ECG OF 29-JUL-2003 16:15,  SIGNIFICANT CHANGES HAVE OCCURRED  Confirmed by MD FARZANEH, NORIS ([id number]) on 8/7/2024 2:35:25 PM (08-07-24 @ 10:08)    Xray Chest 1 View- PORTABLE-Urgent:   ACC: [id number] EXAM:  XR CHEST PORTABLE URGENT 1V   ORDERED BY: SHERLYN VILLALTA     PROCEDURE DATE:  09/25/2024          INTERPRETATION:  EXAMINATION: XR CHEST URGENT    CLINICAL INDICATION: Leukocytosis and acute renal failure.    TECHNIQUE: 2 frontal images of the chest from 9/25/2024 2:03 AM    COMPARISON: Chest x-ray dated 8/7/2024.    FINDINGS:  Screw overlying the left glenoid, likely corresponds to prior Latarjet   procedure.  The heart size is normal.  The lungs are clear.  There is no pneumothorax or pleural effusion.    IMPRESSION:  Clear lungs.    --- End of Report ---           SAMY RENDON MD; Resident Radiologist  This document has been electronically signed.  LIU PEREIAR MD; Attending Radiologist  This document has been electronically signed. Sep 25 2024 10:33AM (09-25-24 @ 02:03)  CT Head No Cont:   ACC: [id number] EXAM:  CT BRAIN   ORDERED BY: SHERLYN VILLALTA     PROCEDURE DATE:  09/25/2024          INTERPRETATION:  CLINICAL INFORMATION: Evaluate for acute process.   Reported history of DVT on apixaban. Acute kidney injury.    COMPARISON: None.    CONTRAST:  IV Contrast: NONE  Complications: None reported at time of study completion    TECHNIQUE:  Serial axial images were obtained from the skull base to the   vertex using multi-slice helical technique. Sagittal and coronal   reformats were obtained.    FINDINGS:    VENTRICLES AND SULCI: Age appropriate involutional changes.  INTRA-AXIAL: No mass effect, acute hemorrhage, or midline shift.  There   are periventricular and subcortical white matter hypodensities,   consistent with microvascular type changes. Gray-white matter   differentiation is observed.  EXTRA-AXIAL: No mass or fluid collection. Basal cisterns are normal in   appearance.    VISUALIZED SINUSES:  Right maxillary sinus mucus retention cyst versus   polyp.  TYMPANOMASTOID CAVITIES:  Clear.  VISUALIZED ORBITS: Bilateral lens replacement.  CALVARIUM: Intact.    MISCELLANEOUS: None.      IMPRESSION:  No acute intracranial hemorrhage, mass effect, or evidence of acute   vascular territorial infarction. If clinical symptomspersist or worsen,   more sensitive evaluation with brain MRI may be obtained, if no   contraindications exist.    Findings discussed with Dr. Villalta  by Dr. Junior on 9/25/2024 1:38 AM with   read back confirmation.    --- End of Report ---          LIONEL JUNIOR MD; Resident Radiologist  This document has been electronically signed.  ANALI ELDER MD; Attending Radiologist  This document has been electronically signed. Sep 25 2024  6:32AM (09-25-24 @ 00:44)

## 2024-10-06 NOTE — PROGRESS NOTE ADULT - ASSESSMENT
69 year old male with HTN, HLD, T2DM, CAD and metastatic urothelial cancer (follows with [facility]) c/b tumor invasion and hydronephrosis, and recent admission for YANIRA on CKD that improved with Nolen catheter placement presenting for AMS and leukocytosis noted during [facility] appt Tuesday. Wife reported nolen was removed during that appt. Patient in the ER with tachycardia, leukocytosis ~25k with no fever and was started on empiric zosyn.     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  H/o metastatic urothelial cancer c/b tumor invasion of L ureter and hydronephrosis on chemotherapy   - UA with no significant pyuria -only 6 WBC with trace LE, negative nitrites, no bacteria   - CTAP with emphysematous cystitis; L renal mass with associated hydronephrosis with no change; interval increase in size of one of the perinephric soft tissue implants   - Ucx negative   - Bcx NGTD x2   - prior culture reviewed-no positive cultures noted   - remains afebrile, mental status at baseline now     Recommendations:   Urology evaluation   zosyn --> ceftriaxone 1g IV Q14l-pl reports he feels better than he has in days, all micro remains neg, note slight rise in wbc to 30k and this is sig above baseline-ordered cbc w diff for am with recs to follow   cefpodoxime 200mg PO Q12h to complete 14 days on 10/15  Monitor temps/CBC, Cr   Continue rest of care per primary team     Thank you for consulting us and involving us in the management of this most interesting and challenging case.  We will follow along in the care of this patient. Please call us at [phone] or text me directly on my cell# at [phone] with any concerns.    Starting tomorrow Dr. Talib Brooke will be covering for our group. If you have any questions, concerns or new micro data please reach out to them at [phone]

## 2024-10-06 NOTE — DISCHARGE NOTE PROVIDER - NSDCFUADDAPPT_GEN_ALL_CORE_FT
APPTS ARE READY TO BE MADE: [x] YES    Best Family or Patient Contact (if needed):[phone]    Additional Information about above appointments (if needed):    1: Continue outpatient follow up at [facility] for metastatic urothelial cancer treatment  2: Follow up with PCP     Other comments or requests:    APPTS ARE READY TO BE MADE: [x] YES    Best Family or Patient Contact (if needed):[phone]    Additional Information about above appointments (if needed):    1: Continue outpatient follow up at [facility] for metastatic urothelial cancer treatment  2: Follow up with PCP     Other comments or requests:     Met with patient face to face and provided the patient with provider referral information, however patient prefers to schedule the appointments on their own.

## 2024-10-06 NOTE — PROGRESS NOTE ADULT - ASSESSMENT
69-year-old male w/ PMH of HTN, HLD, T2DM, CAD and metastatic urothelial CA (follows with [facility]) c/b tumor invasion and hydronephrosis, and recent admission for YANIRA on CKD that improved with Elias catheter placement presenting for mild confusion and fatigue since discharge. In the ED. patient was afebrile but tachycardic to 102. Labs significant for WBC of 25.43 and UA indeterminate. Patient admitted for AMS iso likely sepsis 2/2 UTI.

## 2024-10-06 NOTE — DISCHARGE NOTE PROVIDER - NSDCCPCAREPLAN_GEN_ALL_CORE_FT
PRINCIPAL DISCHARGE DIAGNOSIS  Diagnosis: Sepsis  Assessment and Plan of Treatment: Altered mental status likely secondary to sepsis caused by UTI. Seroquel initiated for delirium. IV antibiotic cefepime per infectious disease team recommendations.   Continue course of antibiotics ......   Please follow-up with your primary care physician within one week of discharge.      SECONDARY DISCHARGE DIAGNOSES  Diagnosis: Urothelial cancer  Assessment and Plan of Treatment: Follow-up with your oncology team at [facility].    Diagnosis: Anemia  Assessment and Plan of Treatment: You were transfused 2 units of packed red blood cells for an acute drop in hemoglobin. Eliquis resumed. CBC stable.    Diagnosis: DVT, lower extremity  Assessment and Plan of Treatment: Continue eliquis     PRINCIPAL DISCHARGE DIAGNOSIS  Diagnosis: Sepsis  Assessment and Plan of Treatment: Altered mental status likely secondary to sepsis caused by UTI. Seroquel initiated for delirium. IV antibiotic cefepime per infectious disease team recommendations.   Continue course of antibiotic cefpodoxime 200mg PO Q12h to complete 14 day course on 10/15/24.  Please follow-up with your primary care physician within one week of discharge.      SECONDARY DISCHARGE DIAGNOSES  Diagnosis: Urothelial cancer  Assessment and Plan of Treatment: Follow-up with your oncology team at [facility].    Diagnosis: Anemia  Assessment and Plan of Treatment: You were transfused 2 units of packed red blood cells for an acute drop in hemoglobin. Eliquis resumed. CBC stable.    Diagnosis: DVT, lower extremity  Assessment and Plan of Treatment: Continue eliquis

## 2024-10-06 NOTE — DISCHARGE NOTE PROVIDER - NSDCMRMEDTOKEN_GEN_ALL_CORE_FT
atorvastatin 20 mg oral tablet: 1 tab(s) orally once a day- IN AM  Eliquis 5 mg oral tablet: 1 tab(s) orally 2 times a day  magnesium oxide 400 mg oral capsule: 1 cap(s) orally once a day  omeprazole 40 mg oral delayed release capsule: 1 cap(s) orally once a day   atorvastatin 20 mg oral tablet: 1 tab(s) orally once a day- IN AM  Eliquis 5 mg oral tablet: 1 tab(s) orally 2 times a day  Home physical therapy: Evaluate and treat ;  ICD10 722.2 (herniated disc)  magnesium oxide 400 mg oral capsule: 1 cap(s) orally once a day  omeprazole 40 mg oral delayed release capsule: 1 cap(s) orally once a day

## 2024-10-06 NOTE — PROGRESS NOTE ADULT - PROBLEM SELECTOR PLAN 1
On admission, patient was confused, and tachycardic with WBC count of 25. He was recently admitted for YANIRA on CKD, s/p Elias placement. On Zosyn for sepsis likely 2/2 UTI given mental status and recent Elias placement. UA neg. Hemodynamically stable, afebrile. No abdominal tenderness on exa  - CT abd/pelvis -> emphysematous cystitis   - lactate normalized, blood/ urine cx neg   - s/p Zosyn  - Bp stable started home metoprolol 25 mg BID w/ hold parameters  - metabolic encephalopathy now resolved AAOx4  - c/w Ceftriaxone 1gm q24hr as per ID. recall as wbc still uptrending

## 2024-10-06 NOTE — DISCHARGE NOTE NURSING/CASE MANAGEMENT/SOCIAL WORK - NSDCFUADDAPPT_GEN_ALL_CORE_FT
APPTS ARE READY TO BE MADE: [x] YES    Best Family or Patient Contact (if needed):[phone]    Additional Information about above appointments (if needed):    1: Continue outpatient follow up at [facility] for metastatic urothelial cancer treatment  2: Follow up with PCP     Other comments or requests:

## 2024-10-07 DIAGNOSIS — N17.0 ACUTE KIDNEY FAILURE WITH TUBULAR NECROSIS: ICD-10-CM

## 2024-10-07 LAB
ANION GAP SERPL CALC-SCNC: 13 MMOL/L — SIGNIFICANT CHANGE UP (ref 5–17)
BASOPHILS # BLD AUTO: 0 K/UL — SIGNIFICANT CHANGE UP (ref 0–0.2)
BASOPHILS NFR BLD AUTO: 0 % — SIGNIFICANT CHANGE UP (ref 0–2)
BUN SERPL-MCNC: 25 MG/DL — HIGH (ref 7–23)
CALCIUM SERPL-MCNC: 11.3 MG/DL — HIGH (ref 8.4–10.5)
CHLORIDE SERPL-SCNC: 94 MMOL/L — LOW (ref 96–108)
CO2 SERPL-SCNC: 23 MMOL/L — SIGNIFICANT CHANGE UP (ref 22–31)
CREAT SERPL-MCNC: 1.57 MG/DL — HIGH (ref 0.5–1.3)
CULTURE RESULTS: SIGNIFICANT CHANGE UP
CULTURE RESULTS: SIGNIFICANT CHANGE UP
EGFR: 47 ML/MIN/1.73M2 — LOW
EOSINOPHIL # BLD AUTO: 0 K/UL — SIGNIFICANT CHANGE UP (ref 0–0.5)
EOSINOPHIL NFR BLD AUTO: 0 % — SIGNIFICANT CHANGE UP (ref 0–6)
GLUCOSE BLDC GLUCOMTR-MCNC: 116 MG/DL — HIGH (ref 70–99)
GLUCOSE BLDC GLUCOMTR-MCNC: 118 MG/DL — HIGH (ref 70–99)
GLUCOSE BLDC GLUCOMTR-MCNC: 119 MG/DL — HIGH (ref 70–99)
GLUCOSE BLDC GLUCOMTR-MCNC: 123 MG/DL — HIGH (ref 70–99)
GLUCOSE SERPL-MCNC: 108 MG/DL — HIGH (ref 70–99)
HCT VFR BLD CALC: 27.7 % — LOW (ref 39–50)
HGB BLD-MCNC: 8.2 G/DL — LOW (ref 13–17)
LYMPHOCYTES # BLD AUTO: 2.31 K/UL — SIGNIFICANT CHANGE UP (ref 1–3.3)
LYMPHOCYTES # BLD AUTO: 8.7 % — LOW (ref 13–44)
MCHC RBC-ENTMCNC: 23.7 PG — LOW (ref 27–34)
MCHC RBC-ENTMCNC: 29.6 GM/DL — LOW (ref 32–36)
MCV RBC AUTO: 80.1 FL — SIGNIFICANT CHANGE UP (ref 80–100)
MONOCYTES # BLD AUTO: 1.38 K/UL — HIGH (ref 0–0.9)
MONOCYTES NFR BLD AUTO: 5.2 % — SIGNIFICANT CHANGE UP (ref 2–14)
NEUTROPHILS # BLD AUTO: 22.84 K/UL — HIGH (ref 1.8–7.4)
NEUTROPHILS NFR BLD AUTO: 86.1 % — HIGH (ref 43–77)
PLATELET # BLD AUTO: 434 K/UL — HIGH (ref 150–400)
POTASSIUM SERPL-MCNC: 4.2 MMOL/L — SIGNIFICANT CHANGE UP (ref 3.5–5.3)
POTASSIUM SERPL-SCNC: 4.2 MMOL/L — SIGNIFICANT CHANGE UP (ref 3.5–5.3)
RBC # BLD: 3.46 M/UL — LOW (ref 4.2–5.8)
RBC # FLD: 21.2 % — HIGH (ref 10.3–14.5)
SODIUM SERPL-SCNC: 130 MMOL/L — LOW (ref 135–145)
SPECIMEN SOURCE: SIGNIFICANT CHANGE UP
SPECIMEN SOURCE: SIGNIFICANT CHANGE UP
WBC # BLD: 26.53 K/UL — HIGH (ref 3.8–10.5)
WBC # FLD AUTO: 26.53 K/UL — HIGH (ref 3.8–10.5)

## 2024-10-07 PROCEDURE — 99233 SBSQ HOSP IP/OBS HIGH 50: CPT

## 2024-10-07 RX ORDER — SODIUM CHLORIDE IRRIG SOLUTION 0.9 %
1000 SOLUTION, IRRIGATION IRRIGATION
Refills: 0 | Status: DISCONTINUED | OUTPATIENT
Start: 2024-10-07 | End: 2024-10-07

## 2024-10-07 RX ORDER — SODIUM CHLORIDE IRRIG SOLUTION 0.9 %
1000 SOLUTION, IRRIGATION IRRIGATION
Refills: 0 | Status: DISCONTINUED | OUTPATIENT
Start: 2024-10-07 | End: 2024-10-08

## 2024-10-07 RX ORDER — SENNOSIDES 8.6 MG
2 TABLET ORAL AT BEDTIME
Refills: 0 | Status: DISCONTINUED | OUTPATIENT
Start: 2024-10-07 | End: 2024-10-14

## 2024-10-07 RX ADMIN — ATORVASTATIN CALCIUM 10 MILLIGRAM(S): 10 TABLET, FILM COATED ORAL at 22:06

## 2024-10-07 RX ADMIN — Medication 25 MILLIGRAM(S): at 05:44

## 2024-10-07 RX ADMIN — FOLIC ACID 1 MILLIGRAM(S): 1 TABLET ORAL at 11:20

## 2024-10-07 RX ADMIN — Medication 81 MILLIGRAM(S): at 11:20

## 2024-10-07 RX ADMIN — Medication 100 MILLIGRAM(S): at 13:49

## 2024-10-07 RX ADMIN — Medication 100 MILLILITER(S): at 10:36

## 2024-10-07 RX ADMIN — APIXABAN 5 MILLIGRAM(S): 5 TABLET, FILM COATED ORAL at 17:40

## 2024-10-07 RX ADMIN — PANTOPRAZOLE SODIUM 40 MILLIGRAM(S): 40 TABLET, DELAYED RELEASE ORAL at 05:44

## 2024-10-07 RX ADMIN — Medication 25 MILLIGRAM(S): at 17:40

## 2024-10-07 RX ADMIN — APIXABAN 5 MILLIGRAM(S): 5 TABLET, FILM COATED ORAL at 05:44

## 2024-10-07 RX ADMIN — Medication 17 GRAM(S): at 18:28

## 2024-10-07 NOTE — PROGRESS NOTE ADULT - SUBJECTIVE AND OBJECTIVE BOX
Patient is a 69y old  Male who presents with a chief complaint of AMS (07 Oct 2024 10:32)      SUBJECTIVE / OVERNIGHT EVENTS: Patient seen and examined at bedside. States that he want to go home, no events overnight     ROS:  All other review of systems negative    Allergies    No Known Allergies    Intolerances        MEDICATIONS  (STANDING):  apixaban 5 milliGRAM(s) Oral every 12 hours  aspirin  chewable 81 milliGRAM(s) Oral daily  atorvastatin 10 milliGRAM(s) Oral at bedtime  cefTRIAXone   IVPB 1000 milliGRAM(s) IV Intermittent every 24 hours  dextrose 5%. 1000 milliLiter(s) (100 mL/Hr) IV Continuous <Continuous>  dextrose 5%. 1000 milliLiter(s) (50 mL/Hr) IV Continuous <Continuous>  dextrose 50% Injectable 25 Gram(s) IV Push once  dextrose 50% Injectable 12.5 Gram(s) IV Push once  dextrose 50% Injectable 25 Gram(s) IV Push once  folic acid 1 milliGRAM(s) Oral daily  glucagon  Injectable 1 milliGRAM(s) IntraMuscular once  influenza  Vaccine (HIGH DOSE) 0.5 milliLiter(s) IntraMuscular once  insulin lispro (ADMELOG) corrective regimen sliding scale   SubCutaneous three times a day before meals  insulin lispro (ADMELOG) corrective regimen sliding scale   SubCutaneous at bedtime  lactated ringers. 1000 milliLiter(s) (100 mL/Hr) IV Continuous <Continuous>  metoprolol tartrate 25 milliGRAM(s) Oral two times a day  pantoprazole    Tablet 40 milliGRAM(s) Oral before breakfast    MEDICATIONS  (PRN):  dextrose Oral Gel 15 Gram(s) Oral once PRN Blood Glucose LESS THAN 70 milliGRAM(s)/deciliter      Vital Signs Last 24 Hrs  T(C): 36.8 (07 Oct 2024 10:01), Max: 36.8 (06 Oct 2024 19:12)  T(F): 98.3 (07 Oct 2024 10:01), Max: 98.3 (06 Oct 2024 19:12)  HR: 94 (07 Oct 2024 10:01) (89 - 103)  BP: 103/71 (07 Oct 2024 10:01) (103/66 - 111/73)  BP(mean): --  RR: 18 (07 Oct 2024 10:01) (18 - 18)  SpO2: 98% (07 Oct 2024 10:01) (95% - 98%)    Parameters below as of 07 Oct 2024 10:01  Patient On (Oxygen Delivery Method): room air      CAPILLARY BLOOD GLUCOSE      POCT Blood Glucose.: 123 mg/dL (07 Oct 2024 12:27)  POCT Blood Glucose.: 119 mg/dL (07 Oct 2024 08:48)  POCT Blood Glucose.: 131 mg/dL (06 Oct 2024 21:39)  POCT Blood Glucose.: 146 mg/dL (06 Oct 2024 16:53)    I&O's Summary    06 Oct 2024 07:01  -  07 Oct 2024 07:00  --------------------------------------------------------  IN: 480 mL / OUT: 0 mL / NET: 480 mL    07 Oct 2024 07:01  -  07 Oct 2024 16:37  --------------------------------------------------------  IN: 480 mL / OUT: 0 mL / NET: 480 mL        PHYSICAL EXAM:  GENERAL: elderly  HEAD:  Atraumatic, Normocephalic  EYES: EOMI, PERRLA, conjunctiva and sclera clear  NECK: Supple, No JVD  CHEST/LUNG: Clear to auscultation bilaterally; No wheeze  HEART: Regular rate and rhythm; No murmurs, rubs, or gallops  ABDOMEN: Soft, Nontender, Nondistended; Bowel sounds present  EXTREMITIES:  2+ Peripheral Pulses, No clubbing, cyanosis, or edema  NEUROLOGY: AAOx3, mildly confused however is able to be easily reorientated     LABS:                        8.2    26.53 )-----------( 434      ( 07 Oct 2024 07:33 )             27.7     10-07    130[L]  |  94[L]  |  25[H]  ----------------------------<  108[H]  4.2   |  23  |  1.57[H]    Ca    11.3[H]      07 Oct 2024 07:33  Phos  2.9     10-06  Mg     1.7     10-06            Urinalysis Basic - ( 07 Oct 2024 07:33 )    Color: x / Appearance: x / SG: x / pH: x  Gluc: 108 mg/dL / Ketone: x  / Bili: x / Urobili: x   Blood: x / Protein: x / Nitrite: x   Leuk Esterase: x / RBC: x / WBC x   Sq Epi: x / Non Sq Epi: x / Bacteria: x        RADIOLOGY & ADDITIONAL TESTS:      Care Discussed with Consultants/Other Providers: ID and [facility] aCP     Case Discussed with wife over the phone

## 2024-10-07 NOTE — PROGRESS NOTE ADULT - ASSESSMENT
69 year old male with HTN, HLD, T2DM, CAD and metastatic urothelial cancer (follows with [facility]) c/b tumor invasion and hydronephrosis, and recent admission for YANIRA on CKD that improved with Nolen catheter placement presenting for AMS and leukocytosis noted during [facility] appt Tuesday. Wife reported nolen was removed during that appt. Patient in the ER with tachycardia, leukocytosis ~25k with no fever and was started on empiric zosyn.     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  H/o metastatic urothelial cancer c/b tumor invasion of L ureter and hydronephrosis on chemotherapy   - UA with no significant pyuria -only 6 WBC with trace LE, negative nitrites, no bacteria   - CTAP with emphysematous cystitis; L renal mass with associated hydronephrosis with no change; interval increase in size of one of the perinephric soft tissue implants   - Urology eval appreciated, recs noted   - Ucx negative   - Bcx NGTD x2   - prior culture reviewed-no positive cultures noted   - s/p zosyn   - 10/6 all micro remains negative, WBC elevation to 30k and sig above baseline  - 10/7 WBC downtrended, remains afebrile, overall feels much better, nontoxic, no new focal findings on exam    Recommendations:   Continue ceftriaxone 1g IV Q24h  On discharge, transition to cefpodoxime 200mg PO Q12h to complete 14 days on 10/15  Monitor temps/CBC, Cr   Continue rest of care per primary team       Talib Brooke M.D.  [facility], Division of Infectious Diseases  [phone]  After 5pm on weekdays and all day on weekends - please call [phone]  Available on [employer] TEAMS

## 2024-10-07 NOTE — PROGRESS NOTE ADULT - ASSESSMENT
Metastatic urothelial cancer  --Previously on gem carbo x 6 through 12/2023 -> avelumab since 2/2024 then held since 6/18/24 due to CRI, anemia, dizziness. PET 7/2024 shows PD at multiple sites.  Started treatment with enfortumab vedotin LD 9/13/24.  --[place]. Herring [place] [facility]  --No plan for treatment while admitted    AMS (resolved)  Leukocytosis   --CT a/p w/ emphysematous cystitis, ID following  --on IV abx, plan to transition to cefpodoxime 200mg BID for discharge (--10/15)  --blood and urine cultures NTD    hx Hydronephrosis/ YANIRA  --Cr 1.59 on admission, Elias removed. Cr stable at 1.56  --2/2 disease burden  --CT a/p 9/24/24: Similar size of dominant 7 cm left renal mass. Increased size of right hepatic lobe lesions. Increased and decreased size of left perinephric masses. Decreased size of left para-aortic lymph node. 0.4 cm distal right ureteral stone, with proximal hydroureter and perivesicular fat stranding.  --repeat CT a/p w/ Emphysematous cystitis. Left renal mass with associated hydronephrosis similar to prior exam. Interval increase in size of one of the perinephric soft tissue implant    Anemia   --Multifactorial d/t malignancy, treatment, CKD  --Hgb 7.5-9.5 at baseline  --Transfuse for Hgb <7, 8 if symptomatic  --folate low (4.4), %sat c/w KE (12)--> folic acid ordered, consider PO iron    hx DVT  --c/w Eliquis    will follow,    Samaria Dangelo NP  Hematology/ Oncology  [facility]  [phone] (office)  [phone] (alt office)  Evenings and weekends please call MD on call or office

## 2024-10-07 NOTE — PROGRESS NOTE ADULT - PROBLEM SELECTOR PLAN 3
now resolved s/p IVF likely from acute infection   - Scr 1.5 today appears to be at new baseline  - f/u BMP   - avoid nephrotoxic meds

## 2024-10-07 NOTE — PROGRESS NOTE ADULT - PROBLEM SELECTOR PLAN 1
On admission, patient was confused, and tachycardic with WBC count of 25. He was recently admitted for YANIRA on CKD, s/p Elias placement. On Zosyn for sepsis likely 2/2 UTI given mental status and recent Elias placement. UA neg. Hemodynamically stable, afebrile. 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

## 2024-10-07 NOTE — PROGRESS NOTE ADULT - ASSESSMENT
69-year-old male w/ PMH of HTN, HLD, T2DM, CAD and metastatic urothelial CA (follows with [facility] [place]) c/b tumor invasion and hydronephrosis, and recent admission for YANIRA on CKD that improved with Elias catheter placement presenting for mild confusion and fatigue since discharge. In the ED. patient was afebrile but tachycardic to 102. Labs significant for WBC of 25.43 and UA indeterminate. Patient admitted for AMS iso likely sepsis 2/2 UTI.

## 2024-10-07 NOTE — PROGRESS NOTE ADULT - SUBJECTIVE AND OBJECTIVE BOX
Patient is a 69y old  Male who presents with a chief complaint of AMS (06 Oct 2024 16:45)    Patient seen and examined  Resting comfortably, looking forward to discharge  no new complaints offered    MEDICATIONS  (STANDING):  apixaban 5 milliGRAM(s) Oral every 12 hours  aspirin  chewable 81 milliGRAM(s) Oral daily  atorvastatin 10 milliGRAM(s) Oral at bedtime  cefTRIAXone   IVPB 1000 milliGRAM(s) IV Intermittent every 24 hours  dextrose 5%. 1000 milliLiter(s) (100 mL/Hr) IV Continuous <Continuous>  dextrose 5%. 1000 milliLiter(s) (50 mL/Hr) IV Continuous <Continuous>  dextrose 50% Injectable 12.5 Gram(s) IV Push once  dextrose 50% Injectable 25 Gram(s) IV Push once  dextrose 50% Injectable 25 Gram(s) IV Push once  folic acid 1 milliGRAM(s) Oral daily  glucagon  Injectable 1 milliGRAM(s) IntraMuscular once  influenza  Vaccine (HIGH DOSE) 0.5 milliLiter(s) IntraMuscular once  insulin lispro (ADMELOG) corrective regimen sliding scale   SubCutaneous at bedtime  insulin lispro (ADMELOG) corrective regimen sliding scale   SubCutaneous three times a day before meals  lactated ringers. 1000 milliLiter(s) (100 mL/Hr) IV Continuous <Continuous>  metoprolol tartrate 25 milliGRAM(s) Oral two times a day  pantoprazole    Tablet 40 milliGRAM(s) Oral before breakfast    MEDICATIONS  (PRN):  dextrose Oral Gel 15 Gram(s) Oral once PRN Blood Glucose LESS THAN 70 milliGRAM(s)/deciliter      Vital Signs Last 24 Hrs  T(C): 36.8 (07 Oct 2024 10:01), Max: 37 (06 Oct 2024 12:09)  T(F): 98.3 (07 Oct 2024 10:01), Max: 98.6 (06 Oct 2024 12:09)  HR: 94 (07 Oct 2024 10:01) (89 - 103)  BP: 103/71 (07 Oct 2024 10:01) (103/66 - 111/73)  BP(mean): --  RR: 18 (07 Oct 2024 10:01) (18 - 20)  SpO2: 98% (07 Oct 2024 10:01) (95% - 99%)    Parameters below as of 07 Oct 2024 10:01  Patient On (Oxygen Delivery Method): room air      PE  Awake, alert  Anicteric, MMM  RRR  CTAB  Abd soft, NT, ND  No c/c                      8.2    26.53 )-----------( 434      ( 07 Oct 2024 07:33 )             27.7       10-07    130[L]  |  94[L]  |  25[H]  ----------------------------<  108[H]  4.2   |  23  |  1.57[H]    Ca    11.3[H]      07 Oct 2024 07:33  Phos  2.9     10-06  Mg     1.7     10-06

## 2024-10-07 NOTE — PROGRESS NOTE ADULT - SUBJECTIVE AND OBJECTIVE BOX
[facility] DIVISION OF INFECTIOUS DISEASES  BRADFORD Nolasco Y. Patel, S. Shah, G. Richard  [phone]  ([phone] - weekdays after 5pm and weekends)    Name: JESSIE GREER  Age/Gender: 69y Male  MRN: [id number]    Interval History:  Patient seen and examined this morning.   Feels better overall, denies fever or any new complaints  Notes reviewed. No concerning overnight events.  Afebrile.   Allergies: No Known Allergies      Objective:  Vitals:   T(F): 98.3 (10-07-24 @ 10:01), Max: 98.3 (10-06-24 @ 19:12)  HR: 94 (10-07-24 @ 10:01) (89 - 103)  BP: 103/71 (10-07-24 @ 10:01) (103/66 - 111/73)  RR: 18 (10-07-24 @ 10:01) (18 - 20)  SpO2: 98% (10-07-24 @ 10:01) (95% - 99%)  Physical Examination:  General: no acute distress, nontoxic   HEENT: NC/AT, EOMI, anicteric  Cardio: S1, S2 present, normal rate  Resp: clear to auscultation bilaterally  Abd: soft, nontender, nondistended   Neuro: AAOx3, no obvious focal deficits  Ext: no edema, no cyanosis  Skin: warm, dry, no visible rash  Psych: appropriate affect and mood for situation  Lines: PIV    Laboratory Studies:  CBC:                       8.2    26.53 )-----------( 434      ( 07 Oct 2024 07:33 )             27.7     WBC Trend:  26.53 10-07-24 @ 07:33  30.60 10-06-24 @ 07:17  24.34 10-05-24 @ 07:10  26.70 10-04-24 @ 19:29  23.47 10-04-24 @ 07:17  22.14 10-03-24 @ 07:23  25.43 10-02-24 @ 17:03    CMP: 10-07    130[L]  |  94[L]  |  25[H]  ----------------------------<  108[H]  4.2   |  23  |  1.57[H]    Ca    11.3[H]      07 Oct 2024 07:33  Phos  2.9     10-06  Mg     1.7     10-06      Creatinine: 1.57 mg/dL (10-07-24 @ 07:33)  Creatinine: 1.56 mg/dL (10-06-24 @ 07:21)  Creatinine: 1.88 mg/dL (10-05-24 @ 07:10)  Creatinine: 1.87 mg/dL (10-04-24 @ 07:16)  Creatinine: 1.58 mg/dL (10-03-24 @ 07:21)  Creatinine: 1.59 mg/dL (10-02-24 @ 16:31)    Microbiology: reviewed   Culture - Urine (collected 10-02-24 @ 17:45)  Source: Clean Catch Clean Catch (Midstream)  Final Report (10-04-24 @ 03:53):    No growth    Culture - Blood (collected 10-02-24 @ 15:50)  Source: .Blood BLOOD  Preliminary Report (10-06-24 @ 23:01):    No growth at 4 days    Culture - Blood (collected 10-02-24 @ 15:35)  Source: .Blood BLOOD  Preliminary Report (10-06-24 @ 23:01):    No growth at 4 days    Culture - Urine (collected 09-24-24 @ 18:15)  Source: Clean Catch Clean Catch (Midstream)  Final Report (09-25-24 @ 17:42):    No growth    SARS-CoV-2 Result: [facility] (02 Oct 2024 16:29)    Radiology: reviewed     Medications:  apixaban 5 milliGRAM(s) Oral every 12 hours  aspirin  chewable 81 milliGRAM(s) Oral daily  atorvastatin 10 milliGRAM(s) Oral at bedtime  cefTRIAXone   IVPB 1000 milliGRAM(s) IV Intermittent every 24 hours  dextrose 5%. 1000 milliLiter(s) IV Continuous <Continuous>  dextrose 5%. 1000 milliLiter(s) IV Continuous <Continuous>  dextrose 50% Injectable 12.5 Gram(s) IV Push once  dextrose 50% Injectable 25 Gram(s) IV Push once  dextrose 50% Injectable 25 Gram(s) IV Push once  dextrose Oral Gel 15 Gram(s) Oral once PRN  folic acid 1 milliGRAM(s) Oral daily  glucagon  Injectable 1 milliGRAM(s) IntraMuscular once  influenza  Vaccine (HIGH DOSE) 0.5 milliLiter(s) IntraMuscular once  insulin lispro (ADMELOG) corrective regimen sliding scale   SubCutaneous at bedtime  insulin lispro (ADMELOG) corrective regimen sliding scale   SubCutaneous three times a day before meals  lactated ringers. 1000 milliLiter(s) IV Continuous <Continuous>  metoprolol tartrate 25 milliGRAM(s) Oral two times a day  pantoprazole    Tablet 40 milliGRAM(s) Oral before breakfast    Current Antimicrobials:  cefTRIAXone   IVPB 1000 milliGRAM(s) IV Intermittent every 24 hours    Prior/Completed Antimicrobials:  piperacillin/tazobactam IVPB...

## 2024-10-08 DIAGNOSIS — G92.8 OTHER TOXIC ENCEPHALOPATHY: ICD-10-CM

## 2024-10-08 LAB
ALBUMIN SERPL ELPH-MCNC: 2.3 G/DL — LOW (ref 3.3–5)
ANION GAP SERPL CALC-SCNC: 15 MMOL/L — SIGNIFICANT CHANGE UP (ref 5–17)
BLD GP AB SCN SERPL QL: NEGATIVE — SIGNIFICANT CHANGE UP
BUN SERPL-MCNC: 30 MG/DL — HIGH (ref 7–23)
CA-I BLD-SCNC: 1.45 MMOL/L — HIGH (ref 1.15–1.33)
CALCIUM SERPL-MCNC: 10.3 MG/DL — SIGNIFICANT CHANGE UP (ref 8.4–10.5)
CHLORIDE SERPL-SCNC: 94 MMOL/L — LOW (ref 96–108)
CO2 SERPL-SCNC: 21 MMOL/L — LOW (ref 22–31)
CREAT SERPL-MCNC: 1.49 MG/DL — HIGH (ref 0.5–1.3)
EGFR: 50 ML/MIN/1.73M2 — LOW
GLUCOSE BLDC GLUCOMTR-MCNC: 122 MG/DL — HIGH (ref 70–99)
GLUCOSE BLDC GLUCOMTR-MCNC: 140 MG/DL — HIGH (ref 70–99)
GLUCOSE BLDC GLUCOMTR-MCNC: 147 MG/DL — HIGH (ref 70–99)
GLUCOSE BLDC GLUCOMTR-MCNC: 155 MG/DL — HIGH (ref 70–99)
GLUCOSE SERPL-MCNC: 96 MG/DL — SIGNIFICANT CHANGE UP (ref 70–99)
HCT VFR BLD CALC: 22.8 % — LOW (ref 39–50)
HCT VFR BLD CALC: 24.1 % — LOW (ref 39–50)
HGB BLD-MCNC: 6.7 G/DL — CRITICAL LOW (ref 13–17)
HGB BLD-MCNC: 7.2 G/DL — LOW (ref 13–17)
MCHC RBC-ENTMCNC: 23.8 PG — LOW (ref 27–34)
MCHC RBC-ENTMCNC: 23.9 PG — LOW (ref 27–34)
MCHC RBC-ENTMCNC: 29.4 GM/DL — LOW (ref 32–36)
MCHC RBC-ENTMCNC: 29.9 GM/DL — LOW (ref 32–36)
MCV RBC AUTO: 79.5 FL — LOW (ref 80–100)
MCV RBC AUTO: 81.4 FL — SIGNIFICANT CHANGE UP (ref 80–100)
NRBC # BLD: 0 /100 WBCS — SIGNIFICANT CHANGE UP (ref 0–0)
NRBC # BLD: 0 /100 WBCS — SIGNIFICANT CHANGE UP (ref 0–0)
PLATELET # BLD AUTO: 398 K/UL — SIGNIFICANT CHANGE UP (ref 150–400)
PLATELET # BLD AUTO: 420 K/UL — HIGH (ref 150–400)
POTASSIUM SERPL-MCNC: 4 MMOL/L — SIGNIFICANT CHANGE UP (ref 3.5–5.3)
POTASSIUM SERPL-SCNC: 4 MMOL/L — SIGNIFICANT CHANGE UP (ref 3.5–5.3)
RBC # BLD: 2.8 M/UL — LOW (ref 4.2–5.8)
RBC # BLD: 3.03 M/UL — LOW (ref 4.2–5.8)
RBC # FLD: 20.9 % — HIGH (ref 10.3–14.5)
RBC # FLD: 21.2 % — HIGH (ref 10.3–14.5)
RH IG SCN BLD-IMP: POSITIVE — SIGNIFICANT CHANGE UP
SODIUM SERPL-SCNC: 130 MMOL/L — LOW (ref 135–145)
WBC # BLD: 23.71 K/UL — HIGH (ref 3.8–10.5)
WBC # BLD: 25.55 K/UL — HIGH (ref 3.8–10.5)
WBC # FLD AUTO: 23.71 K/UL — HIGH (ref 3.8–10.5)
WBC # FLD AUTO: 25.55 K/UL — HIGH (ref 3.8–10.5)

## 2024-10-08 PROCEDURE — 99223 1ST HOSP IP/OBS HIGH 75: CPT | Mod: GC

## 2024-10-08 PROCEDURE — 99232 SBSQ HOSP IP/OBS MODERATE 35: CPT

## 2024-10-08 RX ORDER — QUETIAPINE FUMARATE 50 MG/1
25 TABLET, FILM COATED ORAL AT BEDTIME
Refills: 0 | Status: DISCONTINUED | OUTPATIENT
Start: 2024-10-08 | End: 2024-10-14

## 2024-10-08 RX ORDER — SODIUM CHLORIDE IRRIG SOLUTION 0.9 %
1000 SOLUTION, IRRIGATION IRRIGATION
Refills: 0 | Status: ACTIVE | OUTPATIENT
Start: 2024-10-08 | End: 2024-10-08

## 2024-10-08 RX ORDER — PAMIDRONATE DISODIUM 30 MG
60 VIAL (EA) INTRAVENOUS ONCE
Refills: 0 | Status: COMPLETED | OUTPATIENT
Start: 2024-10-08 | End: 2024-10-08

## 2024-10-08 RX ORDER — 5-HYDROXYTRYPTOPHAN (5-HTP) 100 MG
2 TABLET,DISINTEGRATING ORAL AT BEDTIME
Refills: 0 | Status: DISCONTINUED | OUTPATIENT
Start: 2024-10-08 | End: 2024-10-14

## 2024-10-08 RX ADMIN — QUETIAPINE FUMARATE 25 MILLIGRAM(S): 50 TABLET, FILM COATED ORAL at 21:23

## 2024-10-08 RX ADMIN — Medication 25 MILLIGRAM(S): at 18:05

## 2024-10-08 RX ADMIN — Medication 81 MILLIGRAM(S): at 13:04

## 2024-10-08 RX ADMIN — Medication 2 MILLIGRAM(S): at 21:24

## 2024-10-08 RX ADMIN — Medication 65 MILLIGRAM(S): at 18:04

## 2024-10-08 RX ADMIN — Medication 2 TABLET(S): at 21:24

## 2024-10-08 RX ADMIN — PANTOPRAZOLE SODIUM 40 MILLIGRAM(S): 40 TABLET, DELAYED RELEASE ORAL at 05:45

## 2024-10-08 RX ADMIN — Medication 17 GRAM(S): at 18:04

## 2024-10-08 RX ADMIN — APIXABAN 5 MILLIGRAM(S): 5 TABLET, FILM COATED ORAL at 18:05

## 2024-10-08 RX ADMIN — Medication 100 MILLIGRAM(S): at 13:04

## 2024-10-08 RX ADMIN — ATORVASTATIN CALCIUM 10 MILLIGRAM(S): 10 TABLET, FILM COATED ORAL at 21:23

## 2024-10-08 RX ADMIN — APIXABAN 5 MILLIGRAM(S): 5 TABLET, FILM COATED ORAL at 05:45

## 2024-10-08 RX ADMIN — FOLIC ACID 1 MILLIGRAM(S): 1 TABLET ORAL at 13:04

## 2024-10-08 RX ADMIN — Medication 25 MILLIGRAM(S): at 05:45

## 2024-10-08 NOTE — BH CONSULTATION LIAISON ASSESSMENT NOTE - CURRENT MEDICATION
MEDICATIONS  (STANDING):  apixaban 5 milliGRAM(s) Oral every 12 hours  aspirin  chewable 81 milliGRAM(s) Oral daily  atorvastatin 10 milliGRAM(s) Oral at bedtime  cefTRIAXone   IVPB 1000 milliGRAM(s) IV Intermittent every 24 hours  dextrose 5%. 1000 milliLiter(s) (50 mL/Hr) IV Continuous <Continuous>  dextrose 5%. 1000 milliLiter(s) (100 mL/Hr) IV Continuous <Continuous>  dextrose 50% Injectable 25 Gram(s) IV Push once  dextrose 50% Injectable 12.5 Gram(s) IV Push once  dextrose 50% Injectable 25 Gram(s) IV Push once  folic acid 1 milliGRAM(s) Oral daily  glucagon  Injectable 1 milliGRAM(s) IntraMuscular once  influenza  Vaccine (HIGH DOSE) 0.5 milliLiter(s) IntraMuscular once  insulin lispro (ADMELOG) corrective regimen sliding scale   SubCutaneous three times a day before meals  insulin lispro (ADMELOG) corrective regimen sliding scale   SubCutaneous at bedtime  lactated ringers. 1000 milliLiter(s) (100 mL/Hr) IV Continuous <Continuous>  metoprolol tartrate 25 milliGRAM(s) Oral two times a day  pamidronate IVPB 60 milliGRAM(s) IV Intermittent once  pantoprazole    Tablet 40 milliGRAM(s) Oral before breakfast  polyethylene glycol 3350 17 Gram(s) Oral daily  senna 2 Tablet(s) Oral at bedtime    MEDICATIONS  (PRN):  dextrose Oral Gel 15 Gram(s) Oral once PRN Blood Glucose LESS THAN 70 milliGRAM(s)/deciliter

## 2024-10-08 NOTE — PROGRESS NOTE ADULT - ASSESSMENT
Metastatic urothelial cancer  --Previously on gem carbo x 6 through 12/2023 -> avelumab since 2/2024 then held since 6/18/24 due to CRI, anemia, dizziness. PET 7/2024 shows PD at multiple sites.  Started treatment with enfortumab vedotin LD 9/13/24.  --[place] [facility] [place]  --No plan for treatment while admitted    AMS (resolved)  Leukocytosis   --CT a/p w/ emphysematous cystitis, ID following  --on IV abx, plan to transition to cefpodoxime 200mg BID for discharge (--10/15)  --blood and urine cultures NTD    hx Hydronephrosis/ YANIRA  --Cr 1.59 on admission, Elias removed. Cr stable at 1.56  --2/2 disease burden  --CT a/p 9/24/24: Similar size of dominant 7 cm left renal mass. Increased size of right hepatic lobe lesions. Increased and decreased size of left perinephric masses. Decreased size of left para-aortic lymph node. 0.4 cm distal right ureteral stone, with proximal hydroureter and perivesicular fat stranding.  --repeat CT a/p w/ Emphysematous cystitis. Left renal mass with associated hydronephrosis similar to prior exam. Interval increase in size of one of the perinephric soft tissue implant    Hypercalcemia  --Ca 11.3 on 10/7--> 10.3 on repeat s/p fluids  --ionized calcium still elevated at 1.45  --albumin-corrected calcium calculated  at 11.66 today, will recommend Zometa    Anemia   --Multifactorial d/t malignancy, treatment, CKD  --Hgb 7.5-9.5 at baseline  --Transfuse for Hgb <7, 8 if symptomatic  --folate low (4.4), %sat c/w KE (12)--> folic acid ordered, consider PO iron  --Hgb 6.8 this morning--> 7.2 on repeat     hx DVT  --c/w Eliquis    will follow,    Samaria Dangelo NP  Hematology/ Oncology  [facility]  [phone] (office)  [phone] (alt office)  Evenings and weekends please call MD on call or office     Metastatic urothelial cancer  --Previously on gem carbo x 6 through 12/2023 -> avelumab since 2/2024 then held since 6/18/24 due to CRI, anemia, dizziness. PET 7/2024 shows PD at multiple sites.  Started treatment with enfortumab vedotin LD 9/13/24.  --Follows with Dr. Herring at [facility] [place].  --No plan for treatment while admitted    AMS (resolved)  Leukocytosis   --CT a/p w/ emphysematous cystitis, ID following  --on IV abx, plan to transition to cefpodoxime 200mg BID for discharge (--10/15)  --blood and urine cultures NTD    hx Hydronephrosis/ YANIRA  --Cr 1.59 on admission, Elias removed. Cr stable at 1.56  --2/2 disease burden  --CT a/p 9/24/24: Similar size of dominant 7 cm left renal mass. Increased size of right hepatic lobe lesions. Increased and decreased size of left perinephric masses. Decreased size of left para-aortic lymph node. 0.4 cm distal right ureteral stone, with proximal hydroureter and perivesicular fat stranding.  --repeat CT a/p w/ Emphysematous cystitis. Left renal mass with associated hydronephrosis similar to prior exam. Interval increase in size of one of the perinephric soft tissue implant    Hypercalcemia  --Ca 11.3 on 10/7--> 10.3 on repeat s/p fluids  --ionized calcium still elevated at 1.45  --albumin-corrected calcium calculated  at 11.66 today, will recommend Pamidronate     Anemia   --Multifactorial d/t malignancy, treatment, CKD  --Hgb 7.5-9.5 at baseline  --Transfuse for Hgb <7, 8 if symptomatic  --folate low (4.4), %sat c/w KE (12)--> folic acid ordered, consider PO iron  --Hgb 6.8 this morning--> 7.2 on repeat     hx DVT  --c/w Eliquis    will follow,    Samaria Dangelo NP  Hematology/ Oncology  [facility]  [phone] (office)  [phone] (alt office)  Evenings and weekends please call MD on call or office

## 2024-10-08 NOTE — BH CONSULTATION LIAISON ASSESSMENT NOTE - NSBHCHARTREVIEWINVESTIGATE_PSY_A_CORE FT
< from: 12 Lead ECG (09.24.24 @ 17:40) >      Ventricular Rate 83 BPM    Atrial Rate 83 BPM    P-R Interval 180 ms    QRS Duration 92 ms    Q-T Interval 388 ms    QTC Calculation(Bazett) 455 ms      < end of copied text >    < from: CT Head No Cont (09.25.24 @ 00:44) >    FINDINGS:    VENTRICLES AND SULCI: Age appropriate involutional changes.  INTRA-AXIAL: No mass effect, acute hemorrhage, or midline shift.  There   are periventricular and subcortical white matter hypodensities,   consistent with microvascular type changes. Gray-white matter   differentiation is observed.    < end of copied text >    DB - 2:    Day of the Week: Correct [ ] Incorrect [X]  Months of the Year Backwards: Correct [ ] Incorrect [X]

## 2024-10-08 NOTE — PROGRESS NOTE ADULT - SUBJECTIVE AND OBJECTIVE BOX
Patient is a 69y old  Male who presents with a chief complaint of AMS (07 Oct 2024 16:36)    Patient seen and examined  No new complaints  No acute overnight events reported    MEDICATIONS  (STANDING):  apixaban 5 milliGRAM(s) Oral every 12 hours  aspirin  chewable 81 milliGRAM(s) Oral daily  atorvastatin 10 milliGRAM(s) Oral at bedtime  cefTRIAXone   IVPB 1000 milliGRAM(s) IV Intermittent every 24 hours  dextrose 5%. 1000 milliLiter(s) (50 mL/Hr) IV Continuous <Continuous>  dextrose 5%. 1000 milliLiter(s) (100 mL/Hr) IV Continuous <Continuous>  dextrose 50% Injectable 25 Gram(s) IV Push once  dextrose 50% Injectable 25 Gram(s) IV Push once  dextrose 50% Injectable 12.5 Gram(s) IV Push once  folic acid 1 milliGRAM(s) Oral daily  glucagon  Injectable 1 milliGRAM(s) IntraMuscular once  influenza  Vaccine (HIGH DOSE) 0.5 milliLiter(s) IntraMuscular once  insulin lispro (ADMELOG) corrective regimen sliding scale   SubCutaneous three times a day before meals  insulin lispro (ADMELOG) corrective regimen sliding scale   SubCutaneous at bedtime  lactated ringers. 1000 milliLiter(s) (100 mL/Hr) IV Continuous <Continuous>  metoprolol tartrate 25 milliGRAM(s) Oral two times a day  pantoprazole    Tablet 40 milliGRAM(s) Oral before breakfast  polyethylene glycol 3350 17 Gram(s) Oral daily  senna 2 Tablet(s) Oral at bedtime    MEDICATIONS  (PRN):  dextrose Oral Gel 15 Gram(s) Oral once PRN Blood Glucose LESS THAN 70 milliGRAM(s)/deciliter      Vital Signs Last 24 Hrs  T(C): 36.7 (08 Oct 2024 04:55), Max: 37.7 (07 Oct 2024 20:36)  T(F): 98 (08 Oct 2024 04:55), Max: 99.8 (07 Oct 2024 20:36)  HR: 100 (08 Oct 2024 04:55) (99 - 100)  BP: 124/74 (08 Oct 2024 04:55) (107/70 - 124/74)  BP(mean): --  RR: 18 (08 Oct 2024 04:55) (18 - 18)  SpO2: 98% (08 Oct 2024 04:55) (96% - 98%)    Parameters below as of 08 Oct 2024 04:55  Patient On (Oxygen Delivery Method): room air      PE  Awake, alert  Anicteric, MMM  RRR  CTAB  Abd soft, NT, ND  No c/c                        7.2    25.55 )-----------( 420      ( 08 Oct 2024 09:57 )             24.1       10-08    130[L]  |  94[L]  |  30[H]  ----------------------------<  96  4.0   |  21[L]  |  1.49[H]    Ca    10.3      08 Oct 2024 07:16    TPro  x   /  Alb  2.3[L]  /  TBili  x   /  DBili  x   /  AST  x   /  ALT  x   /  AlkPhos  x   10-08

## 2024-10-08 NOTE — PROGRESS NOTE ADULT - ASSESSMENT
69 year old male with HTN, HLD, T2DM, CAD and metastatic urothelial cancer (follows with [facility] [place]) c/b tumor invasion and hydronephrosis, and recent admission for YANIRA on CKD that improved with Nolen catheter placement presenting for AMS and leukocytosis noted during [facility] appt Tuesday. Wife reported nolen was removed during that appt. Patient in the ER with tachycardia, leukocytosis ~25k with no fever and was started on empiric zosyn.     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  H/o metastatic urothelial cancer c/b tumor invasion of L ureter and hydronephrosis on chemotherapy   - UA with no significant pyuria -only 6 WBC with trace LE, negative nitrites, no bacteria   - CTAP with emphysematous cystitis; L renal mass with associated hydronephrosis with no change; interval increase in size of one of the perinephric soft tissue implants   - Urology eval appreciated, recs noted   - Ucx negative   - Bcx NGTD x2   - prior culture reviewed-no positive cultures noted   - 10/6 all micro remains negative, WBC elevation to 30k and sig above baseline  - WBC downtrended/stable, remains afebrile, overall feels much better, nontoxic, no new focal findings on exam  s/p zosyn 10/2-10/4    Recommendations:   Continue ceftriaxone 1g IV Q24h  On discharge, transition to cefpodoxime 200mg PO Q12h to complete 14 days on 10/15  Monitor temps/CBC, Cr   Continue rest of care per primary team   Patient will follow up with us as outpt, office info given  Stable from ID standpoint at this time.     Talib Brooke M.D.  [facility], Division of Infectious Diseases  [phone]  After 5pm on weekdays and all day on weekends - please call [phone]  Available on [employer] TEAMS

## 2024-10-08 NOTE — BH CONSULTATION LIAISON ASSESSMENT NOTE - NSBHCHARTREVIEWLAB_PSY_A_CORE FT
7.2    25.55 )-----------( 420      ( 08 Oct 2024 09:57 )             24.1     10-08    130[L]  |  94[L]  |  30[H]  ----------------------------<  96  4.0   |  21[L]  |  1.49[H]    Ca    10.3      08 Oct 2024 07:16    TPro  x   /  Alb  2.3[L]  /  TBili  x   /  DBili  x   /  AST  x   /  ALT  x   /  AlkPhos  x   10-08    Urinalysis Basic - ( 08 Oct 2024 07:16 )    Color: x / Appearance: x / SG: x / pH: x  Gluc: 96 mg/dL / Ketone: x  / Bili: x / Urobili: x   Blood: x / Protein: x / Nitrite: x   Leuk Esterase: x / RBC: x / WBC x   Sq Epi: x / Non Sq Epi: x / Bacteria: x

## 2024-10-08 NOTE — BH CONSULTATION LIAISON ASSESSMENT NOTE - NSBHATTESTCOMMENTATTENDFT_PSY_A_CORE
This is a 69-year-old CM patient with PPH of anxiety and PMH of HTN, HLD, T2DM, CAD, anxiety and metastatic [place]ro[place]ial [place] [facility] [place] c/b tumor invasion and hydronephrosis admitted for AMS and fatigue found to be in septic 2/2 UTI now on abx now presenting with disorganized speech, tangential thought processes, and inattention concerning for delirium. The delirium was likely precipitated by his metastatic cancer causing an obstructive pathology and uremia 2/2 CKD. It was perpetuated given a hx of chronic illness and ongoing chemotherapy. Protective factors include stable housing, family support, no significant past psychiatric history.    I have seen and evaluated this patient myself. Chart, labs, meds reviewed. I agree with resident's assessment and plan.

## 2024-10-08 NOTE — PROGRESS NOTE ADULT - ASSESSMENT
69-year-old male w/ PMH of HTN, HLD, T2DM, CAD and metastatic urothelial CA (follows with [facility] [place]) c/b tumor invasion and hydronephrosis, and recent admission for YANIRA on CKD that improved with Elias catheter placement presenting for mild confusion and fatigue since discharge. In the ED. patient was afebrile but tachycardic to 102. Labs significant for WBC of 25.43 and UA indeterminate. Patient admitted for AMS iso likely sepsis 2/2 UTI.

## 2024-10-08 NOTE — BH CONSULTATION LIAISON ASSESSMENT NOTE - NSBHCHARTREVIEWVS_PSY_A_CORE FT
Vital Signs Last 24 Hrs  T(C): 36.4 (08 Oct 2024 11:56), Max: 37.7 (07 Oct 2024 20:36)  T(F): 97.6 (08 Oct 2024 11:56), Max: 99.8 (07 Oct 2024 20:36)  HR: 96 (08 Oct 2024 11:56) (96 - 100)  BP: 106/70 (08 Oct 2024 11:56) (106/70 - 124/74)  BP(mean): --  RR: 18 (08 Oct 2024 11:56) (18 - 18)  SpO2: 97% (08 Oct 2024 11:56) (96% - 98%)    Parameters below as of 08 Oct 2024 11:56  Patient On (Oxygen Delivery Method): room air

## 2024-10-08 NOTE — BH CONSULTATION LIAISON ASSESSMENT NOTE - NSBHCONSULTMEDAGITATION_PSY_A_CORE FT
- Could consider Quetiapine 25 mg PO PRN for mild-moderate agitation  - If refractory to Quetiapine, escalate to Haloperidol .5 or 1 mg for mild-moderate aggression - Could consider Quetiapine 25 mg PO PRN for mild-moderate agitation  - If refractory to Quetiapine, escalate to Haloperidol 0.5 or 1 mg IV

## 2024-10-08 NOTE — BH CONSULTATION LIAISON ASSESSMENT NOTE - NSBHCONSULTFOLLOWAFTERCARE_PSY_A_CORE FT
TBD Patient may not require follow up if at baseline at discharge. He may be referred to a provider in his vicinity (have CSW provide referrals) or to [facility] [phone].

## 2024-10-08 NOTE — BH CONSULTATION LIAISON ASSESSMENT NOTE - NSBHCONSULTPRIMARYDISCUSSYES_PSY_A_CORE FT
Family was concerned he had not returned to his baseline cognition and alerted primary team. Discussed starting medication.

## 2024-10-08 NOTE — PROGRESS NOTE ADULT - PROBLEM SELECTOR PLAN 1
On admission, patient was confused, and tachycardic with WBC count of 25. He was recently admitted for YANIRA on CKD, s/p Elias placement. On Zosyn for sepsis likely 2/2 UTI given mental status and recent Elias placement. UA neg. Hemodynamically stable, afebrile. No abdominal tenderness on exa  - CT abd/pelvis -> emphysematous cystitis   - lactate normalized, blood/ urine cx neg   - c/w Ceftriaxone 1gm q24hr for today d/w ID, poss change to PO tomorrow 10/9 on d/c

## 2024-10-08 NOTE — BH CONSULTATION LIAISON ASSESSMENT NOTE - HPI (INCLUDE ILLNESS QUALITY, SEVERITY, DURATION, TIMING, CONTEXT, MODIFYING FACTORS, ASSOCIATED SIGNS AND SYMPTOMS)
69-year-old male w/ PMH of HTN, HLD, T2DM, CAD and metastatic urothelial CA (follows with [facility]) c/b tumor invasion and hydronephrosis admitted for AMS and fatigue found in sepsis 2/2 UTI.      69-year-old male w/ PMH of HTN, HLD, T2DM, CAD, anxiety and metastatic urothelial CA (follows with [facility] [place]) c/b tumor invasion and hydronephrosis admitted for AMS and fatigue found to be in sepsis 2/2 UTI.     Mr. Vila states that he feels improved from when he previously arrived a few days ago. He reports he is unsure the reason he is in the hospital but that "they keep drawing blood everyday." He states that he is waiting for the doctors to give the he okay to go home but the different specialist have different opinions. He states that he lives at home with his wife and that they spend 6 months in his home in [place] and the other in [place], [place] and that he will be traveling there "October 3rd." He denies any tobacco, illicit drug, or alcohol use. Patient is a poor historian.

## 2024-10-08 NOTE — PROGRESS NOTE ADULT - SUBJECTIVE AND OBJECTIVE BOX
[facility] DIVISION OF INFECTIOUS DISEASES  BRADFORD Nolasco Y. Patel, S. Shah, G. Richard  [phone]  ([phone] - weekdays after 5pm and weekends)    Name: JESSIE GREER  Age/Gender: 69y Male  MRN: [id number]    Interval History:  Patient seen and examined this morning.   Feels well, no new complaints.   Notes reviewed  No concerning overnight events  Afebrile   Allergies: No Known Allergies      Objective:  Vitals:   T(F): 98 (10-08-24 @ 04:55), Max: 99.8 (10-07-24 @ 20:36)  HR: 100 (10-08-24 @ 04:55) (99 - 100)  BP: 124/74 (10-08-24 @ 04:55) (107/70 - 124/74)  RR: 18 (10-08-24 @ 04:55) (18 - 18)  SpO2: 98% (10-08-24 @ 04:55) (96% - 98%)  Physical Examination:  General: no acute distress, nontoxic   HEENT: NC/AT, EOMI, anicteric  Cardio: S1, S2 present, normal rate  Resp: clear to auscultation bilaterally  Abd: soft, nontender, nondistended   Neuro: AAOx3, no obvious focal deficits  Ext: no edema, no cyanosis  Skin: warm, dry, no visible rash    Laboratory Studies:  CBC:                       7.2    25.55 )-----------( 420      ( 08 Oct 2024 09:57 )             24.1     WBC Trend:  25.55 10-08-24 @ 09:57  23.71 10-08-24 @ 07:17  26.53 10-07-24 @ 07:33  30.60 10-06-24 @ 07:17  24.34 10-05-24 @ 07:10  26.70 10-04-24 @ 19:29  23.47 10-04-24 @ 07:17  22.14 10-03-24 @ 07:23  25.43 10-02-24 @ 17:03    CMP: 10-08    130[L]  |  94[L]  |  30[H]  ----------------------------<  96  4.0   |  21[L]  |  1.49[H]    Ca    10.3      08 Oct 2024 07:16    TPro  x   /  Alb  2.3[L]  /  TBili  x   /  DBili  x   /  AST  x   /  ALT  x   /  AlkPhos  x   10-08    Creatinine: 1.49 mg/dL (10-08-24 @ 07:16)  Creatinine: 1.57 mg/dL (10-07-24 @ 07:33)  Creatinine: 1.56 mg/dL (10-06-24 @ 07:21)  Creatinine: 1.88 mg/dL (10-05-24 @ 07:10)  Creatinine: 1.87 mg/dL (10-04-24 @ 07:16)  Creatinine: 1.58 mg/dL (10-03-24 @ 07:21)  Creatinine: 1.59 mg/dL (10-02-24 @ 16:31)    LIVER FUNCTIONS - ( 08 Oct 2024 07:16 )  Alb: 2.3 g/dL / Pro: x     / ALK PHOS: x     / ALT: x     / AST: x     / GGT: x           Microbiology: reviewed   Culture - Urine (collected 10-02-24 @ 17:45)  Source: Clean Catch Clean Catch (Midstream)  Final Report (10-04-24 @ 03:53):    No growth    Culture - Blood (collected 10-02-24 @ 15:50)  Source: .Blood BLOOD  Final Report (10-07-24 @ 23:00):    No growth at 5 days    Culture - Blood (collected 10-02-24 @ 15:35)  Source: .Blood BLOOD  Final Report (10-07-24 @ 23:00):    No growth at 5 days    Culture - Urine (collected 09-24-24 @ 18:15)  Source: Clean Catch Clean Catch (Midstream)  Final Report (09-25-24 @ 17:42):    No growth    SARS-CoV-2 Result: NotDete (02 Oct 2024 16:29)    Radiology: reviewed     Medications:  apixaban 5 milliGRAM(s) Oral every 12 hours  aspirin  chewable 81 milliGRAM(s) Oral daily  atorvastatin 10 milliGRAM(s) Oral at bedtime  cefTRIAXone   IVPB 1000 milliGRAM(s) IV Intermittent every 24 hours  dextrose 5%. 1000 milliLiter(s) IV Continuous <Continuous>  dextrose 5%. 1000 milliLiter(s) IV Continuous <Continuous>  dextrose 50% Injectable 25 Gram(s) IV Push once  dextrose 50% Injectable 12.5 Gram(s) IV Push once  dextrose 50% Injectable 25 Gram(s) IV Push once  dextrose Oral Gel 15 Gram(s) Oral once PRN  folic acid 1 milliGRAM(s) Oral daily  glucagon  Injectable 1 milliGRAM(s) IntraMuscular once  influenza  Vaccine (HIGH DOSE) 0.5 milliLiter(s) IntraMuscular once  insulin lispro (ADMELOG) corrective regimen sliding scale   SubCutaneous three times a day before meals  insulin lispro (ADMELOG) corrective regimen sliding scale   SubCutaneous at bedtime  lactated ringers. 1000 milliLiter(s) IV Continuous <Continuous>  metoprolol tartrate 25 milliGRAM(s) Oral two times a day  pantoprazole    Tablet 40 milliGRAM(s) Oral before breakfast  polyethylene glycol 3350 17 Gram(s) Oral daily  senna 2 Tablet(s) Oral at bedtime    Current Antimicrobials:  cefTRIAXone   IVPB 1000 milliGRAM(s) IV Intermittent every 24 hours    Prior/Completed Antimicrobials:  piperacillin/tazobactam IVPB...

## 2024-10-08 NOTE — CHART NOTE - NSCHARTNOTEFT_GEN_A_CORE
NUTRITION FOLLOW UP NOTE    PATIENT SEEN FOR: first malnutrition follow up    SOURCE: [x] Patient  [x] Current Medical Record  [] RN  [] Family/support person at bedside  [] Patient unavailable/inappropriate  [] Other:    CHART REVIEWED/EVENTS NOTED.  [] No changes to nutrition care plan to note  [x] Nutrition Status:  -hypercalcemia 2/2 malignancy-started IVF 10/7    DIET ORDER:   Diet, Consistent Carbohydrate/No Snacks (10-02-24) + Acertiv Glucose Support 2x/day    CURRENT DIET ORDER IS:  [x] Appropriate:  [] Inadequate:  [] Other:    NUTRITION INTAKE/PROVISION:  [x] PO: Pt reports appetite is good, feels it has improved over the last few days - peeling/eating an orange during RD visit and had finished 100% of cold cereal  -per RN flowsheets, Pt with fluctuating PO intake from 25-75%  -Pt dislikes Acertiv supplements (states daughter was the one who wanted this one), but often drinks Ensures that Pt's wife brings from home (prefers strawberry)  -declined to offer any food preferences  [] Enteral Nutrition:  [] Parenteral Nutrition:    ANTHROPOMETRICS:  Drug Dosing Weight  Height (cm): 177.8 (02 Oct 2024 14:29)  Weight (kg): 63.5 (02 Oct 2024 14:29)  BMI (kg/m2): 20.1 (02 Oct 2024 14:29)  BSA (m2): 1.79 (02 Oct 2024 14:29)  Weights: no new wts to assess    MEDICATIONS:  MEDICATIONS  (STANDING):  apixaban 5 milliGRAM(s) Oral every 12 hours  aspirin  chewable 81 milliGRAM(s) Oral daily  atorvastatin 10 milliGRAM(s) Oral at bedtime  cefTRIAXone   IVPB 1000 milliGRAM(s) IV Intermittent every 24 hours  dextrose 5%. 1000 milliLiter(s) (50 mL/Hr) IV Continuous <Continuous>  dextrose 5%. 1000 milliLiter(s) (100 mL/Hr) IV Continuous <Continuous>  dextrose 50% Injectable 25 Gram(s) IV Push once  dextrose 50% Injectable 12.5 Gram(s) IV Push once  dextrose 50% Injectable 25 Gram(s) IV Push once  folic acid 1 milliGRAM(s) Oral daily  glucagon  Injectable 1 milliGRAM(s) IntraMuscular once  influenza  Vaccine (HIGH DOSE) 0.5 milliLiter(s) IntraMuscular once  insulin lispro (ADMELOG) corrective regimen sliding scale   SubCutaneous at bedtime  insulin lispro (ADMELOG) corrective regimen sliding scale   SubCutaneous three times a day before meals  lactated ringers. 1000 milliLiter(s) (100 mL/Hr) IV Continuous <Continuous>  metoprolol tartrate 25 milliGRAM(s) Oral two times a day  pantoprazole    Tablet 40 milliGRAM(s) Oral before breakfast  polyethylene glycol 3350 17 Gram(s) Oral daily  senna 2 Tablet(s) Oral at bedtime    MEDICATIONS  (PRN):  dextrose Oral Gel 15 Gram(s) Oral once PRN Blood Glucose LESS THAN 70 milliGRAM(s)/deciliter    NUTRITIONALLY PERTINENT LABS:  10-08 Na130 mmol/L[L] Glu 96 mg/dL K+ 4.0 mmol/L Cr  1.49 mg/dL[H] BUN 30 mg/dL[H] 10-06 Phos 2.9 mg/dL 10-08 Alb 2.3 g/dL[L]10-03 ALT 42 U/L AST 38 U/L Alkaline Phosphatase 160 U/L[H]    A1C with Estimated Average Glucose Result: 6.6 % (08-03-24 @ 06:57)    Finger Sticks:  POCT Blood Glucose.: 122 mg/dL (10-08 @ 08:31)  POCT Blood Glucose.: 116 mg/dL (10-07 @ 21:45)  POCT Blood Glucose.: 118 mg/dL (10-07 @ 17:48)  POCT Blood Glucose.: 123 mg/dL (10-07 @ 12:27)    NUTRITIONALLY PERTINENT MEDICATIONS/LABS:  [x] Reviewed  [x] Relevant notes on medications/labs:  -fingersticks controlled on ISS  -hyponatremia noted  -remains on IV Abx    EDEMA:  [x] Reviewed  [x] Relevant notes: b/l feet 2+ edema 10/7    GI/ I&O:  [x] Reviewed  [x] Relevant notes:   [] Other:    SKIN:   [x] No pressure injuries documented, per nursing flowsheet  [] Pressure injury previously noted  [] Change in pressure injury documentation:  [] Other:    ESTIMATED NEEDS:  [x] No change:  [] Updated:  Energy:  6638-3564 kcal/day (30-35 kcal/kg)  Protein: 97.8-127.8  g/day (1.3-1.7 g/kg)  Fluid:   ml/day or [x] defer to team  Based on: IBW 75.2 kg    NUTRITION DIAGNOSIS:  [x] Prior Dx: 1) severe chronic malnutrition and 2) increased nutrient needs  [] New Dx:    EDUCATION:  [x] Yes: adequate protein/calorie intake of diet/supplements; consistent carbohydrate diet principles  [] Not appropriate/warranted    NUTRITION CARE PLAN:  1. Diet: continue consistent carbohydrate diet  2. Supplements: decrease Acertiv Glucose Support to 1x/day (300 kcal, 16 g Pro/8oz) and recommend Ensure Max Protein 1x/day (160 kcal, 30 g Pro/11 oz)  3. Multivitamin/mineral supplementation: recommend MVI daily to optimize nutrition status; continue folic acid  4. Obtain standing wt as feasible to assess wt trend/verify wt status    [] Achieved - Continue current nutrition intervention(s)  [] Current medical condition precludes nutrition intervention at this time.    MONITORING AND EVALUATION:   RD remains available upon request and will follow up per protocol.    Elvia De La O MPH, RD, CDN  Available on [place] TEAMS

## 2024-10-08 NOTE — PROGRESS NOTE ADULT - SUBJECTIVE AND OBJECTIVE BOX
Patient is a 69y old  Male who presents with a chief complaint of AMS (08 Oct 2024 11:03)      SUBJECTIVE / OVERNIGHT EVENTS: Patient seen and examined at bedside. denies any complaints. States he feels ok. no events overnight     ROS:  All other review of systems negative    Allergies    No Known Allergies    Intolerances        MEDICATIONS  (STANDING):  apixaban 5 milliGRAM(s) Oral every 12 hours  aspirin  chewable 81 milliGRAM(s) Oral daily  atorvastatin 10 milliGRAM(s) Oral at bedtime  cefTRIAXone   IVPB 1000 milliGRAM(s) IV Intermittent every 24 hours  dextrose 5%. 1000 milliLiter(s) (50 mL/Hr) IV Continuous <Continuous>  dextrose 5%. 1000 milliLiter(s) (100 mL/Hr) IV Continuous <Continuous>  dextrose 50% Injectable 12.5 Gram(s) IV Push once  dextrose 50% Injectable 25 Gram(s) IV Push once  dextrose 50% Injectable 25 Gram(s) IV Push once  folic acid 1 milliGRAM(s) Oral daily  glucagon  Injectable 1 milliGRAM(s) IntraMuscular once  influenza  Vaccine (HIGH DOSE) 0.5 milliLiter(s) IntraMuscular once  insulin lispro (ADMELOG) corrective regimen sliding scale   SubCutaneous at bedtime  insulin lispro (ADMELOG) corrective regimen sliding scale   SubCutaneous three times a day before meals  lactated ringers. 1000 milliLiter(s) (100 mL/Hr) IV Continuous <Continuous>  metoprolol tartrate 25 milliGRAM(s) Oral two times a day  pamidronate IVPB 60 milliGRAM(s) IV Intermittent once  pantoprazole    Tablet 40 milliGRAM(s) Oral before breakfast  polyethylene glycol 3350 17 Gram(s) Oral daily  senna 2 Tablet(s) Oral at bedtime    MEDICATIONS  (PRN):  dextrose Oral Gel 15 Gram(s) Oral once PRN Blood Glucose LESS THAN 70 milliGRAM(s)/deciliter      Vital Signs Last 24 Hrs  T(C): 36.4 (08 Oct 2024 11:56), Max: 37.7 (07 Oct 2024 20:36)  T(F): 97.6 (08 Oct 2024 11:56), Max: 99.8 (07 Oct 2024 20:36)  HR: 96 (08 Oct 2024 11:56) (96 - 100)  BP: 106/70 (08 Oct 2024 11:56) (106/70 - 124/74)  BP(mean): --  RR: 18 (08 Oct 2024 11:56) (18 - 18)  SpO2: 97% (08 Oct 2024 11:56) (96% - 98%)    Parameters below as of 08 Oct 2024 11:56  Patient On (Oxygen Delivery Method): room air      CAPILLARY BLOOD GLUCOSE      POCT Blood Glucose.: 147 mg/dL (08 Oct 2024 12:17)  POCT Blood Glucose.: 122 mg/dL (08 Oct 2024 08:31)  POCT Blood Glucose.: 116 mg/dL (07 Oct 2024 21:45)  POCT Blood Glucose.: 118 mg/dL (07 Oct 2024 17:48)    I&O's Summary    07 Oct 2024 07:01  -  08 Oct 2024 07:00  --------------------------------------------------------  IN: 1920 mL / OUT: 1000 mL / NET: 920 mL    08 Oct 2024 07:01  -  08 Oct 2024 14:09  --------------------------------------------------------  IN: 480 mL / OUT: 0 mL / NET: 480 mL        PHYSICAL EXAM:  GENERAL: NAD, well-developed  HEAD:  Atraumatic, Normocephalic  EYES: EOMI, PERRLA, conjunctiva and sclera clear  NECK: Supple, No JVD  CHEST/LUNG: Clear to auscultation bilaterally; No wheeze  HEART: Regular rate and rhythm; No murmurs, rubs, or gallops  ABDOMEN: Soft, Nontender, Nondistended; Bowel sounds present  EXTREMITIES:  2+ Peripheral Pulses, No clubbing, cyanosis, or edema  NEUROLOGY: AAOx3, non-focal      LABS:                        7.2    25.55 )-----------( 420      ( 08 Oct 2024 09:57 )             24.1     10-08    130[L]  |  94[L]  |  30[H]  ----------------------------<  96  4.0   |  21[L]  |  1.49[H]    Ca    10.3      08 Oct 2024 07:16    TPro  x   /  Alb  2.3[L]  /  TBili  x   /  DBili  x   /  AST  x   /  ALT  x   /  AlkPhos  x   10-08          Urinalysis Basic - ( 08 Oct 2024 07:16 )    Color: x / Appearance: x / SG: x / pH: x  Gluc: 96 mg/dL / Ketone: x  / Bili: x / Urobili: x   Blood: x / Protein: x / Nitrite: x   Leuk Esterase: x / RBC: x / WBC x   Sq Epi: x / Non Sq Epi: x / Bacteria: x        RADIOLOGY & ADDITIONAL TESTS:      Case Discussed with patient's spouse in detail

## 2024-10-09 DIAGNOSIS — D64.9 ANEMIA, UNSPECIFIED: ICD-10-CM

## 2024-10-09 LAB
GLUCOSE BLDC GLUCOMTR-MCNC: 119 MG/DL — HIGH (ref 70–99)
GLUCOSE BLDC GLUCOMTR-MCNC: 119 MG/DL — HIGH (ref 70–99)
GLUCOSE BLDC GLUCOMTR-MCNC: 126 MG/DL — HIGH (ref 70–99)
GLUCOSE BLDC GLUCOMTR-MCNC: 129 MG/DL — HIGH (ref 70–99)
HCT VFR BLD CALC: 22.8 % — LOW (ref 39–50)
HGB BLD-MCNC: 6.8 G/DL — CRITICAL LOW (ref 13–17)

## 2024-10-09 PROCEDURE — 99239 HOSP IP/OBS DSCHRG MGMT >30: CPT

## 2024-10-09 PROCEDURE — 99232 SBSQ HOSP IP/OBS MODERATE 35: CPT

## 2024-10-09 RX ORDER — SODIUM CHLORIDE 0.9 % (FLUSH) 0.9 %
500 SYRINGE (ML) INJECTION ONCE
Refills: 0 | Status: COMPLETED | OUTPATIENT
Start: 2024-10-09 | End: 2024-10-09

## 2024-10-09 RX ORDER — SODIUM CHLORIDE IRRIG SOLUTION 0.9 %
500 SOLUTION, IRRIGATION IRRIGATION ONCE
Refills: 0 | Status: COMPLETED | OUTPATIENT
Start: 2024-10-09 | End: 2024-10-09

## 2024-10-09 RX ORDER — ACETAMINOPHEN 325 MG
1000 TABLET ORAL ONCE
Refills: 0 | Status: COMPLETED | OUTPATIENT
Start: 2024-10-09 | End: 2024-10-09

## 2024-10-09 RX ADMIN — Medication 17 GRAM(S): at 13:21

## 2024-10-09 RX ADMIN — Medication 1000 MILLIGRAM(S): at 21:18

## 2024-10-09 RX ADMIN — APIXABAN 5 MILLIGRAM(S): 5 TABLET, FILM COATED ORAL at 05:13

## 2024-10-09 RX ADMIN — Medication 81 MILLIGRAM(S): at 13:21

## 2024-10-09 RX ADMIN — Medication 1000 MILLILITER(S): at 20:53

## 2024-10-09 RX ADMIN — PANTOPRAZOLE SODIUM 40 MILLIGRAM(S): 40 TABLET, DELAYED RELEASE ORAL at 05:13

## 2024-10-09 RX ADMIN — Medication 1000 MILLILITER(S): at 23:59

## 2024-10-09 RX ADMIN — Medication 100 MILLIGRAM(S): at 13:20

## 2024-10-09 RX ADMIN — Medication 25 MILLIGRAM(S): at 05:13

## 2024-10-09 RX ADMIN — Medication 2 TABLET(S): at 21:09

## 2024-10-09 RX ADMIN — APIXABAN 5 MILLIGRAM(S): 5 TABLET, FILM COATED ORAL at 18:22

## 2024-10-09 RX ADMIN — FOLIC ACID 1 MILLIGRAM(S): 1 TABLET ORAL at 13:20

## 2024-10-09 RX ADMIN — Medication 2 MILLIGRAM(S): at 21:10

## 2024-10-09 RX ADMIN — Medication 25 MILLIGRAM(S): at 18:22

## 2024-10-09 RX ADMIN — ATORVASTATIN CALCIUM 10 MILLIGRAM(S): 10 TABLET, FILM COATED ORAL at 21:10

## 2024-10-09 RX ADMIN — Medication 400 MILLIGRAM(S): at 20:39

## 2024-10-09 RX ADMIN — QUETIAPINE FUMARATE 25 MILLIGRAM(S): 50 TABLET, FILM COATED ORAL at 21:09

## 2024-10-09 NOTE — PROGRESS NOTE ADULT - ASSESSMENT
Metastatic urothelial cancer  --Previously on gem carbo x 6 through 12/2023 -> avelumab since 2/2024 then held since 6/18/24 due to CRI, anemia, dizziness. PET 7/2024 shows PD at multiple sites.  Started treatment with enfortumab vedotin LD 9/13/24.  --Follows with Dr. Herring at [facility] [place].  --No plan for treatment while admitted    AMS (resolved)  Leukocytosis   --CT a/p w/ emphysematous cystitis, ID following  --on IV abx, plan to transition to cefpodoxime 200mg BID for discharge (--10/15)  --blood and urine cultures NTD    hx Hydronephrosis/ YANIRA  --Cr 1.59 on admission, Elias removed. Cr stable at 1.56  --2/2 disease burden  --CT a/p 9/24/24: Similar size of dominant 7 cm left renal mass. Increased size of right hepatic lobe lesions. Increased and decreased size of left perinephric masses. Decreased size of left para-aortic lymph node. 0.4 cm distal right ureteral stone, with proximal hydroureter and perivesicular fat stranding.  --repeat CT a/p w/ Emphysematous cystitis. Left renal mass with associated hydronephrosis similar to prior exam. Interval increase in size of one of the perinephric soft tissue implant    Hypercalcemia  --Ca 11.3 on 10/7--> 10.3 on repeat s/p fluids  --ionized calcium still elevated at 1.45  --albumin-corrected calcium calculated  at 11.66 on 10/8,  Pamidronate given    Anemia   --Multifactorial d/t malignancy, treatment, CKD  --Hgb 7.5-9.5 at baseline  --Transfuse for Hgb <7, 8 if symptomatic  --folate low (4.4), %sat c/w KE (12)--> folic acid ordered, consider PO iron  --Hgb 6.8 again this morning-->please give 1u PRBCs 10/9    hx DVT  --c/w Eliquis    will follow,    Samaria Dangelo NP  Hematology/ Oncology  [facility]  [phone] (office)  [phone] (alt office)  Evenings and weekends please call MD on call or office

## 2024-10-09 NOTE — PROGRESS NOTE ADULT - SUBJECTIVE AND OBJECTIVE BOX
Patient is a 69y old  Male who presents with a chief complaint of AMS (09 Oct 2024 10:06)    Patient seen and examined  AOx4 this morning, no complaints reported  Looking forward to discharge  Family at bedside    MEDICATIONS  (STANDING):  apixaban 5 milliGRAM(s) Oral every 12 hours  aspirin  chewable 81 milliGRAM(s) Oral daily  atorvastatin 10 milliGRAM(s) Oral at bedtime  cefTRIAXone   IVPB 1000 milliGRAM(s) IV Intermittent every 24 hours  dextrose 5%. 1000 milliLiter(s) (50 mL/Hr) IV Continuous <Continuous>  dextrose 5%. 1000 milliLiter(s) (100 mL/Hr) IV Continuous <Continuous>  dextrose 50% Injectable 25 Gram(s) IV Push once  dextrose 50% Injectable 25 Gram(s) IV Push once  dextrose 50% Injectable 12.5 Gram(s) IV Push once  folic acid 1 milliGRAM(s) Oral daily  glucagon  Injectable 1 milliGRAM(s) IntraMuscular once  influenza  Vaccine (HIGH DOSE) 0.5 milliLiter(s) IntraMuscular once  insulin lispro (ADMELOG) corrective regimen sliding scale   SubCutaneous three times a day before meals  insulin lispro (ADMELOG) corrective regimen sliding scale   SubCutaneous at bedtime  lactated ringers. 1000 milliLiter(s) (100 mL/Hr) IV Continuous <Continuous>  melatonin 2 milliGRAM(s) Oral at bedtime  metoprolol tartrate 25 milliGRAM(s) Oral two times a day  pantoprazole    Tablet 40 milliGRAM(s) Oral before breakfast  polyethylene glycol 3350 17 Gram(s) Oral daily  QUEtiapine 25 milliGRAM(s) Oral at bedtime  senna 2 Tablet(s) Oral at bedtime    MEDICATIONS  (PRN):  dextrose Oral Gel 15 Gram(s) Oral once PRN Blood Glucose LESS THAN 70 milliGRAM(s)/deciliter      Vital Signs Last 24 Hrs  T(C): 36.8 (09 Oct 2024 05:08), Max: 37.1 (08 Oct 2024 19:18)  T(F): 98.2 (09 Oct 2024 05:08), Max: 98.8 (08 Oct 2024 19:18)  HR: 98 (09 Oct 2024 05:08) (72 - 98)  BP: 100/67 (09 Oct 2024 05:08) (100/67 - 155/76)  BP(mean): --  RR: 18 (09 Oct 2024 05:08) (18 - 18)  SpO2: 95% (09 Oct 2024 05:08) (95% - 98%)    Parameters below as of 09 Oct 2024 05:08  Patient On (Oxygen Delivery Method): room air        PE  Awake, alert  Anicteric, MMM  RRR  CTAB  Abd soft, NT, ND  No c/c                        6.8    x     )-----------( x        ( 09 Oct 2024 11:18 )             22.8       10-08    130[L]  |  94[L]  |  30[H]  ----------------------------<  96  4.0   |  21[L]  |  1.49[H]    Ca    10.3      08 Oct 2024 07:16    TPro  x   /  Alb  2.3[L]  /  TBili  x   /  DBili  x   /  AST  x   /  ALT  x   /  AlkPhos  x   10-08

## 2024-10-09 NOTE — PROGRESS NOTE ADULT - ASSESSMENT
69 year old male with HTN, HLD, T2DM, CAD and [place]ro[place]ial [place] [facility] [place] c/b tumor invasion and hydronephrosis, and recent admission for YANIRA on CKD that improved with Nolen catheter placement presenting for AMS and leukocytosis noted [place] [facility] [place]. Wife reported nolen was removed during that appt. Patient in the ER with tachycardia, leukocytosis ~25k with no fever and was started on empiric zosyn.     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  H/o metastatic urothelial cancer c/b tumor invasion of L ureter and hydronephrosis on chemotherapy   - UA with no significant pyuria -only 6 WBC with trace LE, negative nitrites, no bacteria   - CTAP with emphysematous cystitis; L renal mass with associated hydronephrosis with no change; interval increase in size of one of the perinephric soft tissue implants   - Urology eval appreciated, recs noted   - Ucx negative   - Bcx NGTD x2   - prior culture reviewed-no positive cultures noted   - 10/6 all micro remains negative, WBC elevation to 30k and sig above baseline  - WBC downtrended/stable, remains afebrile, overall feels much better, nontoxic, no new focal findings on exam  s/p zosyn 10/2-10/4    Recommendations:   Continue ceftriaxone 1g IV Q24h  On discharge, transition to cefpodoxime 200mg PO Q12h to complete 14 days on 10/15  Monitor temps/CBC, Cr   Continue rest of care per primary team   Patient will follow up with us as outpt, office info given  Stable from ID standpoint at this time.       Talib Brooke M.D.  [facility], Division of Infectious Diseases  [phone]  After 5pm on weekdays and all day on weekends - please call [phone]  Available on [employer] TEAMS

## 2024-10-09 NOTE — PROGRESS NOTE ADULT - ASSESSMENT
69-year-old male w/ PMH of HTN, HLD, T2DM, CAD and metastatic urothelial CA (follows with [facility] [place]) c/b tumor invasion and hydronephrosis, and recent admission for YANIRA on CKD that improved with Elias catheter placement presenting for mild confusion and fatigue since discharge. In the ED. patient was afebrile but tachycardic to 102. Labs significant for WBC of 25.43 and UA indeterminate. Patient admitted for AMS iso likely sepsis 2/2 UTI.

## 2024-10-09 NOTE — PROGRESS NOTE ADULT - PROBLEM SELECTOR PLAN 1
On admission, patient was confused, and tachycardic with WBC count of 25. He was recently admitted for YANIRA on CKD, s/p Elias placement. On Zosyn for sepsis likely 2/2 UTI given mental status and recent Elias placement. UA neg. Hemodynamically stable, afebrile. No abdominal tenderness on exa  - CT abd/pelvis -> emphysematous cystitis   - lactate normalized, blood/ urine cx neg   - c/w Ceftriaxone 1gm q24hr for today d/w ID,  transition to cefpodoxime 200mg PO Q12h to complete 14 days on 10/15

## 2024-10-09 NOTE — PROGRESS NOTE ADULT - SUBJECTIVE AND OBJECTIVE BOX
Patient is a 69y old  Male who presents with a chief complaint of AMS (09 Oct 2024 11:38)      SUBJECTIVE / OVERNIGHT EVENTS: Patient seen and examined at bedside. States that he feels ok , AAOx4, intermittently confused but able to be reoriented     ROS:  All other review of systems negative    Allergies    No Known Allergies    Intolerances        MEDICATIONS  (STANDING):  apixaban 5 milliGRAM(s) Oral every 12 hours  aspirin  chewable 81 milliGRAM(s) Oral daily  atorvastatin 10 milliGRAM(s) Oral at bedtime  cefTRIAXone   IVPB 1000 milliGRAM(s) IV Intermittent every 24 hours  dextrose 5%. 1000 milliLiter(s) (50 mL/Hr) IV Continuous <Continuous>  dextrose 5%. 1000 milliLiter(s) (100 mL/Hr) IV Continuous <Continuous>  dextrose 50% Injectable 12.5 Gram(s) IV Push once  dextrose 50% Injectable 25 Gram(s) IV Push once  dextrose 50% Injectable 25 Gram(s) IV Push once  folic acid 1 milliGRAM(s) Oral daily  glucagon  Injectable 1 milliGRAM(s) IntraMuscular once  influenza  Vaccine (HIGH DOSE) 0.5 milliLiter(s) IntraMuscular once  insulin lispro (ADMELOG) corrective regimen sliding scale   SubCutaneous at bedtime  insulin lispro (ADMELOG) corrective regimen sliding scale   SubCutaneous three times a day before meals  lactated ringers. 1000 milliLiter(s) (100 mL/Hr) IV Continuous <Continuous>  melatonin 2 milliGRAM(s) Oral at bedtime  metoprolol tartrate 25 milliGRAM(s) Oral two times a day  pantoprazole    Tablet 40 milliGRAM(s) Oral before breakfast  polyethylene glycol 3350 17 Gram(s) Oral daily  QUEtiapine 25 milliGRAM(s) Oral at bedtime  senna 2 Tablet(s) Oral at bedtime    MEDICATIONS  (PRN):  dextrose Oral Gel 15 Gram(s) Oral once PRN Blood Glucose LESS THAN 70 milliGRAM(s)/deciliter      Vital Signs Last 24 Hrs  T(C): 36.8 (09 Oct 2024 14:41), Max: 37.1 (08 Oct 2024 19:18)  T(F): 98.3 (09 Oct 2024 14:41), Max: 98.8 (08 Oct 2024 19:18)  HR: 98 (09 Oct 2024 14:41) (72 - 99)  BP: 109/70 (09 Oct 2024 14:41) (100/67 - 155/76)  BP(mean): --  RR: 18 (09 Oct 2024 14:41) (18 - 18)  SpO2: 96% (09 Oct 2024 14:41) (95% - 99%)    Parameters below as of 09 Oct 2024 14:41  Patient On (Oxygen Delivery Method): room air      CAPILLARY BLOOD GLUCOSE      POCT Blood Glucose.: 126 mg/dL (09 Oct 2024 12:15)  POCT Blood Glucose.: 119 mg/dL (09 Oct 2024 08:12)  POCT Blood Glucose.: 140 mg/dL (08 Oct 2024 21:45)  POCT Blood Glucose.: 155 mg/dL (08 Oct 2024 16:52)    I&O's Summary    08 Oct 2024 07:01  -  09 Oct 2024 07:00  --------------------------------------------------------  IN: 480 mL / OUT: 450 mL / NET: 30 mL    09 Oct 2024 07:01  -  09 Oct 2024 15:57  --------------------------------------------------------  IN: 480 mL / OUT: 200 mL / NET: 280 mL        PHYSICAL EXAM:  GENERAL: NAD, well-developed  HEAD:  Atraumatic, Normocephalic  EYES: EOMI, PERRLA, conjunctiva and sclera clear  NECK: Supple, No JVD  CHEST/LUNG: Clear to auscultation bilaterally; No wheeze  HEART: Regular rate and rhythm; No murmurs, rubs, or gallops  ABDOMEN: Soft, Nontender, Nondistended; Bowel sounds present  EXTREMITIES:  2+ Peripheral Pulses, No clubbing, cyanosis, or edema  NEUROLOGY: AAOx4 intermittent confusion     LABS:                        6.8    x     )-----------( x        ( 09 Oct 2024 11:18 )             22.8     10-08    130[L]  |  94[L]  |  30[H]  ----------------------------<  96  4.0   |  21[L]  |  1.49[H]    Ca    10.3      08 Oct 2024 07:16    TPro  x   /  Alb  2.3[L]  /  TBili  x   /  DBili  x   /  AST  x   /  ALT  x   /  AlkPhos  x   10-08          Urinalysis Basic - ( 08 Oct 2024 07:16 )    Color: x / Appearance: x / SG: x / pH: x  Gluc: 96 mg/dL / Ketone: x  / Bili: x / Urobili: x   Blood: x / Protein: x / Nitrite: x   Leuk Esterase: x / RBC: x / WBC x   Sq Epi: x / Non Sq Epi: x / Bacteria: x        RADIOLOGY & ADDITIONAL TESTS:    Consultant(s) Notes Reviewed:  psych rec noted     Care Discussed with Consultants/Other Providers: medicine acp    Case Discussed with patient's wife at bedside in detail

## 2024-10-09 NOTE — PROGRESS NOTE ADULT - PROBLEM SELECTOR PLAN 2
likely Multifactorial malignancy, recent treatment, CKD  - I d/w Dr. Sandoval (hematologist) baseline outpt 7-8  - currently 6.8 no s/s of acute bleeding   --will tx 1 unit PRBC, f/u post tx CBC  - d/c home after post-tx CBC if responds appropriately, outpt f/u at [facility]

## 2024-10-09 NOTE — PROGRESS NOTE ADULT - SUBJECTIVE AND OBJECTIVE BOX
[facility] DIVISION OF INFECTIOUS DISEASES  BRADFORD Nolasco Y. Patel, S. Shah, G. [facility]  [phone]  ([phone] - weekdays after 5pm and weekends)    Name: JESSIE GREER  Age/Gender: 69y Male  MRN: [id number]    Interval History:  Patient seen and examined this morning.   No new complaints noted.  Notes reviewed  No concerning overnight events  Afebrile   Allergies: No Known Allergies      Objective:  Vitals:   T(F): 98.2 (10-09-24 @ 05:08), Max: 98.8 (10-08-24 @ 19:18)  HR: 98 (10-09-24 @ 05:08) (72 - 98)  BP: 100/67 (10-09-24 @ 05:08) (100/67 - 155/76)  RR: 18 (10-09-24 @ 05:08) (18 - 18)  SpO2: 95% (10-09-24 @ 05:08) (95% - 98%)  Physical Examination:  General: no acute distress, nontoxic   HEENT: NC/AT, anicteric, EOMI  Respiratory: no acc muscle use, breathing comfortably  Cardiovascular: S1 and S2 present  Gastrointestinal: soft, nontender, nondistended  Extremities: no edema, no cyanosis  Skin: no visible rash    Laboratory Studies:  CBC:                       7.2    25.55 )-----------( 420      ( 08 Oct 2024 09:57 )             24.1     WBC Trend:  25.55 10-08-24 @ 09:57  23.71 10-08-24 @ 07:17  26.53 10-07-24 @ 07:33  30.60 10-06-24 @ 07:17  24.34 10-05-24 @ 07:10  26.70 10-04-24 @ 19:29  23.47 10-04-24 @ 07:17  22.14 10-03-24 @ 07:23  25.43 10-02-24 @ 17:03    CMP: 10-08    130[L]  |  94[L]  |  30[H]  ----------------------------<  96  4.0   |  21[L]  |  1.49[H]    Ca    10.3      08 Oct 2024 07:16    TPro  x   /  Alb  2.3[L]  /  TBili  x   /  DBili  x   /  AST  x   /  ALT  x   /  AlkPhos  x   10-08    Creatinine: 1.49 mg/dL (10-08-24 @ 07:16)  Creatinine: 1.57 mg/dL (10-07-24 @ 07:33)  Creatinine: 1.56 mg/dL (10-06-24 @ 07:21)  Creatinine: 1.88 mg/dL (10-05-24 @ 07:10)  Creatinine: 1.87 mg/dL (10-04-24 @ 07:16)  Creatinine: 1.58 mg/dL (10-03-24 @ 07:21)  Creatinine: 1.59 mg/dL (10-02-24 @ 16:31)    LIVER FUNCTIONS - ( 08 Oct 2024 07:16 )  Alb: 2.3 g/dL / Pro: x     / ALK PHOS: x     / ALT: x     / AST: x     / GGT: x           Microbiology: reviewed   Culture - Urine (collected 10-02-24 @ 17:45)  Source: Clean Catch Clean Catch (Midstream)  Final Report (10-04-24 @ 03:53):    No growth    Culture - Blood (collected 10-02-24 @ 15:50)  Source: .Blood BLOOD  Final Report (10-07-24 @ 23:00):    No growth at 5 days    Culture - Blood (collected 10-02-24 @ 15:35)  Source: .Blood BLOOD  Final Report (10-07-24 @ 23:00):    No growth at 5 days    SARS-CoV-2 Result: NotDete (02 Oct 2024 16:29)    Radiology: reviewed     Medications:  apixaban 5 milliGRAM(s) Oral every 12 hours  aspirin  chewable 81 milliGRAM(s) Oral daily  atorvastatin 10 milliGRAM(s) Oral at bedtime  cefTRIAXone   IVPB 1000 milliGRAM(s) IV Intermittent every 24 hours  dextrose 5%. 1000 milliLiter(s) IV Continuous <Continuous>  dextrose 5%. 1000 milliLiter(s) IV Continuous <Continuous>  dextrose 50% Injectable 25 Gram(s) IV Push once  dextrose 50% Injectable 25 Gram(s) IV Push once  dextrose 50% Injectable 12.5 Gram(s) IV Push once  dextrose Oral Gel 15 Gram(s) Oral once PRN  folic acid 1 milliGRAM(s) Oral daily  glucagon  Injectable 1 milliGRAM(s) IntraMuscular once  influenza  Vaccine (HIGH DOSE) 0.5 milliLiter(s) IntraMuscular once  insulin lispro (ADMELOG) corrective regimen sliding scale   SubCutaneous three times a day before meals  insulin lispro (ADMELOG) corrective regimen sliding scale   SubCutaneous at bedtime  lactated ringers. 1000 milliLiter(s) IV Continuous <Continuous>  melatonin 2 milliGRAM(s) Oral at bedtime  metoprolol tartrate 25 milliGRAM(s) Oral two times a day  pantoprazole    Tablet 40 milliGRAM(s) Oral before breakfast  polyethylene glycol 3350 17 Gram(s) Oral daily  QUEtiapine 25 milliGRAM(s) Oral at bedtime  senna 2 Tablet(s) Oral at bedtime    Current Antimicrobials:  cefTRIAXone   IVPB 1000 milliGRAM(s) IV Intermittent every 24 hours    Prior/Completed Antimicrobials:  piperacillin/tazobactam IVPB...

## 2024-10-09 NOTE — PROGRESS NOTE ADULT - PROBLEM SELECTOR PLAN 3
AMS on admission now improved  - patient also has hospital delirium with intermittent confusion, psych consulted mac family request (in agreement)   - seroquel 25 mg HS started   - AAOx4 today  - PT rec home PT

## 2024-10-09 NOTE — PROGRESS NOTE ADULT - PROBLEM SELECTOR PLAN 6
Patient with metastatic urothelial cancer, follows with [facility], c/b tumor invasion of L ureter and hydronephrosis. On chemotherapy, missed last session on Friday.   - f/u heme/onc consulted: no inpatient tx at this time   - Urology consulted, appreciated> PVR wnl, no need for nolen at this time    # hypercalcemia 2/2 malignancy   - pamidronate 60 mg x1 f/u ca outpt

## 2024-10-10 LAB
APPEARANCE UR: CLEAR — SIGNIFICANT CHANGE UP
BACTERIA # UR AUTO: NEGATIVE /HPF — SIGNIFICANT CHANGE UP
BILIRUB UR-MCNC: NEGATIVE — SIGNIFICANT CHANGE UP
CAST: 4 /LPF — SIGNIFICANT CHANGE UP (ref 0–4)
COLOR SPEC: YELLOW — SIGNIFICANT CHANGE UP
DIFF PNL FLD: NEGATIVE — SIGNIFICANT CHANGE UP
FLUAV AG NPH QL: SIGNIFICANT CHANGE UP
FLUBV AG NPH QL: SIGNIFICANT CHANGE UP
GLUCOSE BLDC GLUCOMTR-MCNC: 124 MG/DL — HIGH (ref 70–99)
GLUCOSE BLDC GLUCOMTR-MCNC: 127 MG/DL — HIGH (ref 70–99)
GLUCOSE BLDC GLUCOMTR-MCNC: 134 MG/DL — HIGH (ref 70–99)
GLUCOSE BLDC GLUCOMTR-MCNC: 165 MG/DL — HIGH (ref 70–99)
GLUCOSE UR QL: NEGATIVE MG/DL — SIGNIFICANT CHANGE UP
HCT VFR BLD CALC: 23.8 % — LOW (ref 39–50)
HCT VFR BLD CALC: 26.6 % — LOW (ref 39–50)
HGB BLD-MCNC: 7.4 G/DL — LOW (ref 13–17)
HGB BLD-MCNC: 8 G/DL — LOW (ref 13–17)
KETONES UR-MCNC: NEGATIVE MG/DL — SIGNIFICANT CHANGE UP
LEUKOCYTE ESTERASE UR-ACNC: ABNORMAL
MCHC RBC-ENTMCNC: 24.6 PG — LOW (ref 27–34)
MCHC RBC-ENTMCNC: 24.7 PG — LOW (ref 27–34)
MCHC RBC-ENTMCNC: 30.1 GM/DL — LOW (ref 32–36)
MCHC RBC-ENTMCNC: 31.1 GM/DL — LOW (ref 32–36)
MCV RBC AUTO: 79.6 FL — LOW (ref 80–100)
MCV RBC AUTO: 81.8 FL — SIGNIFICANT CHANGE UP (ref 80–100)
NITRITE UR-MCNC: NEGATIVE — SIGNIFICANT CHANGE UP
NRBC # BLD: 0 /100 WBCS — SIGNIFICANT CHANGE UP (ref 0–0)
NRBC # BLD: 0 /100 WBCS — SIGNIFICANT CHANGE UP (ref 0–0)
PH UR: 5.5 — SIGNIFICANT CHANGE UP (ref 5–8)
PLATELET # BLD AUTO: 336 K/UL — SIGNIFICANT CHANGE UP (ref 150–400)
PLATELET # BLD AUTO: 363 K/UL — SIGNIFICANT CHANGE UP (ref 150–400)
PROT UR-MCNC: 30 MG/DL
RBC # BLD: 2.99 M/UL — LOW (ref 4.2–5.8)
RBC # BLD: 3.25 M/UL — LOW (ref 4.2–5.8)
RBC # FLD: 20.3 % — HIGH (ref 10.3–14.5)
RBC # FLD: 20.3 % — HIGH (ref 10.3–14.5)
RBC CASTS # UR COMP ASSIST: 2 /HPF — SIGNIFICANT CHANGE UP (ref 0–4)
RSV RNA NPH QL NAA+NON-PROBE: SIGNIFICANT CHANGE UP
SARS-COV-2 RNA SPEC QL NAA+PROBE: SIGNIFICANT CHANGE UP
SP GR SPEC: 1.02 — SIGNIFICANT CHANGE UP (ref 1–1.03)
SQUAMOUS # UR AUTO: 3 /HPF — SIGNIFICANT CHANGE UP (ref 0–5)
UROBILINOGEN FLD QL: 0.2 MG/DL — SIGNIFICANT CHANGE UP (ref 0.2–1)
WBC # BLD: 19.05 K/UL — HIGH (ref 3.8–10.5)
WBC # BLD: 21.8 K/UL — HIGH (ref 3.8–10.5)
WBC # FLD AUTO: 19.05 K/UL — HIGH (ref 3.8–10.5)
WBC # FLD AUTO: 21.8 K/UL — HIGH (ref 3.8–10.5)
WBC UR QL: 14 /HPF — HIGH (ref 0–5)

## 2024-10-10 PROCEDURE — 71045 X-RAY EXAM CHEST 1 VIEW: CPT | Mod: 26

## 2024-10-10 PROCEDURE — 99233 SBSQ HOSP IP/OBS HIGH 50: CPT

## 2024-10-10 RX ORDER — CEFEPIME 2 G/1
INJECTION, POWDER, FOR SOLUTION INTRAVENOUS
Refills: 0 | Status: DISCONTINUED | OUTPATIENT
Start: 2024-10-10 | End: 2024-10-14

## 2024-10-10 RX ORDER — SODIUM CHLORIDE IRRIG SOLUTION 0.9 %
1000 SOLUTION, IRRIGATION IRRIGATION
Refills: 0 | Status: DISCONTINUED | OUTPATIENT
Start: 2024-10-10 | End: 2024-10-13

## 2024-10-10 RX ORDER — CEFEPIME 2 G/1
2000 INJECTION, POWDER, FOR SOLUTION INTRAVENOUS ONCE
Refills: 0 | Status: COMPLETED | OUTPATIENT
Start: 2024-10-10 | End: 2024-10-10

## 2024-10-10 RX ORDER — ACETAMINOPHEN 325 MG
650 TABLET ORAL EVERY 6 HOURS
Refills: 0 | Status: DISCONTINUED | OUTPATIENT
Start: 2024-10-10 | End: 2024-10-14

## 2024-10-10 RX ORDER — CEFEPIME 2 G/1
2000 INJECTION, POWDER, FOR SOLUTION INTRAVENOUS EVERY 12 HOURS
Refills: 0 | Status: DISCONTINUED | OUTPATIENT
Start: 2024-10-10 | End: 2024-10-14

## 2024-10-10 RX ADMIN — PANTOPRAZOLE SODIUM 40 MILLIGRAM(S): 40 TABLET, DELAYED RELEASE ORAL at 05:05

## 2024-10-10 RX ADMIN — Medication 17 GRAM(S): at 13:32

## 2024-10-10 RX ADMIN — Medication 25 MILLIGRAM(S): at 18:09

## 2024-10-10 RX ADMIN — Medication 2 TABLET(S): at 21:01

## 2024-10-10 RX ADMIN — Medication 80 MILLILITER(S): at 15:28

## 2024-10-10 RX ADMIN — CEFEPIME 100 MILLIGRAM(S): 2 INJECTION, POWDER, FOR SOLUTION INTRAVENOUS at 22:04

## 2024-10-10 RX ADMIN — Medication 25 MILLIGRAM(S): at 05:05

## 2024-10-10 RX ADMIN — FOLIC ACID 1 MILLIGRAM(S): 1 TABLET ORAL at 13:32

## 2024-10-10 RX ADMIN — Medication 650 MILLIGRAM(S): at 20:58

## 2024-10-10 RX ADMIN — ATORVASTATIN CALCIUM 10 MILLIGRAM(S): 10 TABLET, FILM COATED ORAL at 21:01

## 2024-10-10 RX ADMIN — CEFEPIME 100 MILLIGRAM(S): 2 INJECTION, POWDER, FOR SOLUTION INTRAVENOUS at 11:03

## 2024-10-10 RX ADMIN — QUETIAPINE FUMARATE 25 MILLIGRAM(S): 50 TABLET, FILM COATED ORAL at 21:01

## 2024-10-10 RX ADMIN — APIXABAN 5 MILLIGRAM(S): 5 TABLET, FILM COATED ORAL at 18:09

## 2024-10-10 RX ADMIN — Medication 650 MILLIGRAM(S): at 20:07

## 2024-10-10 RX ADMIN — APIXABAN 5 MILLIGRAM(S): 5 TABLET, FILM COATED ORAL at 05:05

## 2024-10-10 RX ADMIN — Medication 81 MILLIGRAM(S): at 13:32

## 2024-10-10 RX ADMIN — Medication 2 MILLIGRAM(S): at 21:02

## 2024-10-10 NOTE — PROGRESS NOTE ADULT - ASSESSMENT
69 year old male with HTN, HLD, T2DM, CAD and metastatic urothelial cancer (follows with [facility] [place]) c/b tumor invasion and hydronephrosis, and recent admission for YANIRA on CKD that improved with Nolen catheter placement presenting for AMS and leukocytosis noted during [facility] appt Tuesday. Wife reported nolen was removed during that appt. Patient in the ER with tachycardia, leukocytosis ~25k with no fever and was started on empiric zosyn.     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  H/o metastatic urothelial cancer c/b tumor invasion of L ureter and hydronephrosis on chemotherapy   - UA with no significant pyuria -only 6 WBC with trace LE, negative nitrites, no bacteria   - CTAP with emphysematous cystitis; L renal mass with associated hydronephrosis with no change; interval increase in size of one of the perinephric soft tissue implants   - Urology eval appreciated, recs noted   - Ucx negative   - Bcx NGTD x2   - prior culture reviewed-no positive cultures noted   - 10/6 all micro remains negative, WBC elevation to 30k and sig above baseline  - 10/9 evening with fever x1 in between transfusion (was given 1/2 PRBC prior to fever, and then 2nd half after temp normalized)-may be transfusion related, rule out infectious cause  - COVID/Flu/RSV negative   - CXR reviewed - lungs clear, no consolidation noted   - WBC downtrended, overall feels much better, nontoxic, no new focal findings on exam  s/p zosyn 10/2-10/4    Recommendations:   Follow UA, Ucx, Bcx  Follow CXR report  Broadened from ceftriaxone to cefepime for now pending above   Monitor temps/CBC, Cr   Continue rest of care per primary team     D/w Dr. Yimi Brooke M.D.  [facility], Division of Infectious Diseases  [phone]  After 5pm on weekdays and all day on weekends - please call [phone]  Available on [employer] TEAMS

## 2024-10-10 NOTE — PROGRESS NOTE ADULT - SUBJECTIVE AND OBJECTIVE BOX
[facility] DIVISION OF INFECTIOUS DISEASES  BRADFORD Nolasco Y. Patel, S. Shah, G. [facility]  [phone]  ([phone] - weekdays after 5pm and weekends)    Name: JESSIE GREER  Age/Gender: 69y Male  MRN: [id number]    Interval History:  Patient seen and examined this morning.   States feels well, no new complaints.   Notes reviewed, temps noted.   Allergies: No Known Allergies      Objective:  Vitals:   T(F): 97.8 (10-10-24 @ 09:54), Max: 102.7 (10-09-24 @ 20:29)  HR: 84 (10-10-24 @ 09:54) (84 - 130)  BP: 101/66 (10-10-24 @ 09:54) (81/50 - 135/81)  RR: 18 (10-10-24 @ 09:54) (18 - 20)  SpO2: 97% (10-10-24 @ 09:54) (94% - 99%)  Physical Examination:  General: no acute distress, nontoxic   HEENT: NC/AT, EOMI, anicteric  Cardio: S1, S2 present, normal rate  Resp: clear to auscultation bilaterally  Abd: soft, nontender, nondistended   Neuro: AAOx3, no obvious focal deficits  Ext: no edema, no cyanosis  Skin: warm, dry, no visible rash    Laboratory Studies:  CBC:                       8.0    21.80 )-----------( 363      ( 10 Oct 2024 07:35 )             26.6     WBC Trend:  21.80 10-10-24 @ 07:35  19.05 10-10-24 @ 01:56  25.55 10-08-24 @ 09:57  23.71 10-08-24 @ 07:17  26.53 10-07-24 @ 07:33  30.60 10-06-24 @ 07:17  24.34 10-05-24 @ 07:10  26.70 10-04-24 @ 19:29  23.47 10-04-24 @ 07:17    CMP:   Creatinine: 1.49 mg/dL (10-08-24 @ 07:16)  Creatinine: 1.57 mg/dL (10-07-24 @ 07:33)  Creatinine: 1.56 mg/dL (10-06-24 @ 07:21)  Creatinine: 1.88 mg/dL (10-05-24 @ 07:10)  Creatinine: 1.87 mg/dL (10-04-24 @ 07:16)    Microbiology: reviewed   Culture - Urine (collected 10-02-24 @ 17:45)  Source: Clean Catch Clean Catch (Midstream)  Final Report (10-04-24 @ 03:53):    No growth    Culture - Blood (collected 10-02-24 @ 15:50)  Source: .Blood BLOOD  Final Report (10-07-24 @ 23:00):    No growth at 5 days    Culture - Blood (collected 10-02-24 @ 15:35)  Source: .Blood BLOOD  Final Report (10-07-24 @ 23:00):    No growth at 5 days    SARS-CoV-2 Result: NotDetec (10 Oct 2024 10:24)    Radiology: reviewed     Medications:  apixaban 5 milliGRAM(s) Oral every 12 hours  aspirin  chewable 81 milliGRAM(s) Oral daily  atorvastatin 10 milliGRAM(s) Oral at bedtime  cefepime   IVPB      cefepime   IVPB 2000 milliGRAM(s) IV Intermittent every 12 hours  dextrose 5%. 1000 milliLiter(s) IV Continuous <Continuous>  dextrose 5%. 1000 milliLiter(s) IV Continuous <Continuous>  dextrose 50% Injectable 25 Gram(s) IV Push once  dextrose 50% Injectable 25 Gram(s) IV Push once  dextrose 50% Injectable 12.5 Gram(s) IV Push once  dextrose Oral Gel 15 Gram(s) Oral once PRN  folic acid 1 milliGRAM(s) Oral daily  glucagon  Injectable 1 milliGRAM(s) IntraMuscular once  influenza  Vaccine (HIGH DOSE) 0.5 milliLiter(s) IntraMuscular once  insulin lispro (ADMELOG) corrective regimen sliding scale   SubCutaneous three times a day before meals  insulin lispro (ADMELOG) corrective regimen sliding scale   SubCutaneous at bedtime  lactated ringers. 1000 milliLiter(s) IV Continuous <Continuous>  melatonin 2 milliGRAM(s) Oral at bedtime  metoprolol tartrate 25 milliGRAM(s) Oral two times a day  pantoprazole    Tablet 40 milliGRAM(s) Oral before breakfast  polyethylene glycol 3350 17 Gram(s) Oral daily  QUEtiapine 25 milliGRAM(s) Oral at bedtime  senna 2 Tablet(s) Oral at bedtime    Current Antimicrobials:  cefepime   IVPB 2000 milliGRAM(s) IV Intermittent every 12 hours  cefepime   IVPB        Prior/Completed Antimicrobials:  cefepime   IVPB  piperacillin/tazobactam IVPB...

## 2024-10-10 NOTE — PROGRESS NOTE ADULT - ASSESSMENT
Metastatic urothelial cancer  --Previously on gem carbo x 6 through 12/2023 -> avelumab since 2/2024 then held since 6/18/24 due to CRI, anemia, dizziness. PET 7/2024 shows PD at multiple sites. Started treatment with enfortumab vedotin, LD 9/13/24.  --Follows with Dr. Herring at [facility] [place].  --No plan for treatment while admitted    AMS (resolved)  Leukocytosis   --CT a/p w/ emphysematous cystitis, ID following  --on IV abx, plan to transition to cefpodoxime 200mg BID for discharge however, febrile overnight 10/10 - started Cefepime, pending repeat cultures.   --blood and urine cultures NTD    hx Hydronephrosis/ YANIRA  --Cr 1.59 on admission, Elias removed. Cr stable at 1.49  --2/2 disease burden  --CT a/p 9/24/24: Similar size of dominant 7 cm left renal mass. Increased size of right hepatic lobe lesions. Increased and decreased size of left perinephric masses. Decreased size of left para-aortic lymph node. 0.4 cm distal right ureteral stone, with proximal hydroureter and perivesicular fat stranding.  --repeat CT a/p w/ Emphysematous cystitis. Left renal mass with associated hydronephrosis similar to prior exam. Interval increase in size of one of the perinephric soft tissue implant    Hypercalcemia  --Ca 11.3 on 10/7--> 10.3 on repeat s/p fluids  --ionized calcium still elevated at 1.45 on 10/8  --albumin-corrected calcium calculated  at 11.66 on 10/8, s/p Pamidronate     Anemia   --Multifactorial d/t malignancy, treatment, CKD  --Hgb 7.5-9.5 at baseline  --Transfuse for Hgb <7, 8 if symptomatic  --folate low (4.4), %sat c/w KE (12)--> folic acid ordered, consider PO iron  --S/p two half units pRBC overnight, hgb 8  --Recommend transfusion medicine consult for possible transfusion reaction work up    hx DVT  --c/w Eliquis    will follow,    Buddy Bond PA-C  Hematology/Oncology  [facility]  [phone] (office)

## 2024-10-10 NOTE — PROGRESS NOTE ADULT - PROBLEM SELECTOR PLAN 1
On admission, patient was confused, and tachycardic with WBC count of 25. He was recently admitted for YANIRA on CKD, s/p Elias placement. On Zosyn for sepsis likely 2/2 UTI given mental status and recent Elias placement. UA neg. Hemodynamically stable, afebrile. No abdominal tenderness on exa  - CT abd/pelvis -> emphysematous cystitis   - lactate normalized, blood/ urine cx neg   - overnight febrile 102.7 d/w ID likely post blood tx> however will check blood cx, Ua, urin cx, CXR and changed ABX to Cefepime.  - Give LR today

## 2024-10-10 NOTE — PROGRESS NOTE ADULT - SUBJECTIVE AND OBJECTIVE BOX
Patient is a 69y old  Male who presents with a chief complaint of AMS (09 Oct 2024 15:57)    Patient seen and examined at bedside, sitting comfortably. Febrile overnight, awaiting repeat cultures.     MEDICATIONS  (STANDING):  apixaban 5 milliGRAM(s) Oral every 12 hours  aspirin  chewable 81 milliGRAM(s) Oral daily  atorvastatin 10 milliGRAM(s) Oral at bedtime  cefepime   IVPB      cefepime   IVPB 2000 milliGRAM(s) IV Intermittent every 12 hours  dextrose 5%. 1000 milliLiter(s) (100 mL/Hr) IV Continuous <Continuous>  dextrose 5%. 1000 milliLiter(s) (50 mL/Hr) IV Continuous <Continuous>  dextrose 50% Injectable 25 Gram(s) IV Push once  dextrose 50% Injectable 25 Gram(s) IV Push once  dextrose 50% Injectable 12.5 Gram(s) IV Push once  folic acid 1 milliGRAM(s) Oral daily  glucagon  Injectable 1 milliGRAM(s) IntraMuscular once  influenza  Vaccine (HIGH DOSE) 0.5 milliLiter(s) IntraMuscular once  insulin lispro (ADMELOG) corrective regimen sliding scale   SubCutaneous three times a day before meals  insulin lispro (ADMELOG) corrective regimen sliding scale   SubCutaneous at bedtime  lactated ringers. 1000 milliLiter(s) (100 mL/Hr) IV Continuous <Continuous>  melatonin 2 milliGRAM(s) Oral at bedtime  metoprolol tartrate 25 milliGRAM(s) Oral two times a day  pantoprazole    Tablet 40 milliGRAM(s) Oral before breakfast  polyethylene glycol 3350 17 Gram(s) Oral daily  QUEtiapine 25 milliGRAM(s) Oral at bedtime  senna 2 Tablet(s) Oral at bedtime    MEDICATIONS  (PRN):  dextrose Oral Gel 15 Gram(s) Oral once PRN Blood Glucose LESS THAN 70 milliGRAM(s)/deciliter    Vital Signs Last 24 Hrs  T(C): 36.6 (10 Oct 2024 09:54), Max: 39.3 (09 Oct 2024 20:29)  T(F): 97.8 (10 Oct 2024 09:54), Max: 102.7 (09 Oct 2024 20:29)  HR: 84 (10 Oct 2024 09:54) (84 - 130)  BP: 101/66 (10 Oct 2024 09:54) (81/50 - 135/81)  BP(mean): --  RR: 18 (10 Oct 2024 09:54) (18 - 20)  SpO2: 97% (10 Oct 2024 09:54) (94% - 99%)    Parameters below as of 10 Oct 2024 09:54  Patient On (Oxygen Delivery Method): room air    PE  NAD  Awake, alert  Anicteric, MMM  Abd soft, NT, ND  No c/c/e  No rash grossly                        8.0    21.80 )-----------( 363      ( 10 Oct 2024 07:35 )             26.6

## 2024-10-10 NOTE — PROGRESS NOTE ADULT - SUBJECTIVE AND OBJECTIVE BOX
Patient is a 69y old  Male who presents with a chief complaint of AMS (10 Oct 2024 11:55)      SUBJECTIVE / OVERNIGHT EVENTS: Patient seen and examined at bedside. States that he feels ok , AAOx4, intermittently confused but able to be reoriented oven right noted to be febrile 102.7    ROS:  All other review of systems negative    Allergies    No Known Allergies    Intolerances        MEDICATIONS  (STANDING):  apixaban 5 milliGRAM(s) Oral every 12 hours  aspirin  chewable 81 milliGRAM(s) Oral daily  atorvastatin 10 milliGRAM(s) Oral at bedtime  cefepime   IVPB 2000 milliGRAM(s) IV Intermittent every 12 hours  cefepime   IVPB      dextrose 5%. 1000 milliLiter(s) (50 mL/Hr) IV Continuous <Continuous>  dextrose 5%. 1000 milliLiter(s) (100 mL/Hr) IV Continuous <Continuous>  dextrose 50% Injectable 12.5 Gram(s) IV Push once  dextrose 50% Injectable 25 Gram(s) IV Push once  dextrose 50% Injectable 25 Gram(s) IV Push once  folic acid 1 milliGRAM(s) Oral daily  glucagon  Injectable 1 milliGRAM(s) IntraMuscular once  influenza  Vaccine (HIGH DOSE) 0.5 milliLiter(s) IntraMuscular once  insulin lispro (ADMELOG) corrective regimen sliding scale   SubCutaneous at bedtime  insulin lispro (ADMELOG) corrective regimen sliding scale   SubCutaneous three times a day before meals  lactated ringers. 1000 milliLiter(s) (100 mL/Hr) IV Continuous <Continuous>  melatonin 2 milliGRAM(s) Oral at bedtime  metoprolol tartrate 25 milliGRAM(s) Oral two times a day  pantoprazole    Tablet 40 milliGRAM(s) Oral before breakfast  polyethylene glycol 3350 17 Gram(s) Oral daily  QUEtiapine 25 milliGRAM(s) Oral at bedtime  senna 2 Tablet(s) Oral at bedtime    MEDICATIONS  (PRN):  dextrose Oral Gel 15 Gram(s) Oral once PRN Blood Glucose LESS THAN 70 milliGRAM(s)/deciliter      Vital Signs Last 24 Hrs  T(C): 36.6 (10 Oct 2024 09:54), Max: 39.3 (09 Oct 2024 20:29)  T(F): 97.8 (10 Oct 2024 09:54), Max: 102.7 (09 Oct 2024 20:29)  HR: 84 (10 Oct 2024 09:54) (84 - 130)  BP: 101/66 (10 Oct 2024 09:54) (81/50 - 129/75)  BP(mean): --  RR: 18 (10 Oct 2024 09:54) (18 - 20)  SpO2: 97% (10 Oct 2024 09:54) (94% - 97%)    Parameters below as of 10 Oct 2024 09:54  Patient On (Oxygen Delivery Method): room air      CAPILLARY BLOOD GLUCOSE      POCT Blood Glucose.: 134 mg/dL (10 Oct 2024 12:14)  POCT Blood Glucose.: 127 mg/dL (10 Oct 2024 08:18)  POCT Blood Glucose.: 129 mg/dL (09 Oct 2024 21:47)  POCT Blood Glucose.: 119 mg/dL (09 Oct 2024 17:25)    I&O's Summary    09 Oct 2024 07:01  -  10 Oct 2024 07:00  --------------------------------------------------------  IN: 1280 mL / OUT: 200 mL / NET: 1080 mL        PHYSICAL EXAM:  GENERAL: NAD, well-developed  HEAD:  Atraumatic, Normocephalic  EYES: EOMI, PERRLA, conjunctiva and sclera clear  NECK: Supple, No JVD  CHEST/LUNG: Clear to auscultation bilaterally; No wheeze  HEART: Regular rate and rhythm; No murmurs, rubs, or gallops  ABDOMEN: Soft, Nontender, Nondistended; Bowel sounds present  EXTREMITIES:  2+ Peripheral Pulses, No clubbing, cyanosis, or edema  NEUROLOGY: AAOx3, non-focal      LABS:                        8.0    21.80 )-----------( 363      ( 10 Oct 2024 07:35 )             26.6                     RADIOLOGY & ADDITIONAL TESTS:      Case Discussed with wife at bedside

## 2024-10-10 NOTE — PROGRESS NOTE ADULT - ASSESSMENT
69-year-old male w/ PMH of HTN, HLD, T2DM, CAD and metastatic urothelial CA (follows with [facility]) c/b tumor invasion and hydronephrosis, and recent admission for YANIRA on CKD that improved with Elias catheter placement presenting for mild confusion and fatigue since discharge. In the ED. patient was afebrile but tachycardic to 102. Labs significant for WBC of 25.43 and UA indeterminate. Patient admitted for AMS iso likely sepsis 2/2 UTI.

## 2024-10-11 LAB
ANION GAP SERPL CALC-SCNC: 11 MMOL/L — SIGNIFICANT CHANGE UP (ref 5–17)
BILIRUB DIRECT SERPL-MCNC: 0.2 MG/DL — SIGNIFICANT CHANGE UP (ref 0–0.3)
BILIRUB INDIRECT FLD-MCNC: 0.2 MG/DL — SIGNIFICANT CHANGE UP (ref 0.2–1)
BILIRUB SERPL-MCNC: 0.4 MG/DL — SIGNIFICANT CHANGE UP (ref 0.2–1.2)
BUN SERPL-MCNC: 44 MG/DL — HIGH (ref 7–23)
CALCIUM SERPL-MCNC: 9.7 MG/DL — SIGNIFICANT CHANGE UP (ref 8.4–10.5)
CHLORIDE SERPL-SCNC: 96 MMOL/L — SIGNIFICANT CHANGE UP (ref 96–108)
CO2 SERPL-SCNC: 23 MMOL/L — SIGNIFICANT CHANGE UP (ref 22–31)
CREAT SERPL-MCNC: 1.63 MG/DL — HIGH (ref 0.5–1.3)
EGFR: 45 ML/MIN/1.73M2 — LOW
GLUCOSE BLDC GLUCOMTR-MCNC: 124 MG/DL — HIGH (ref 70–99)
GLUCOSE BLDC GLUCOMTR-MCNC: 134 MG/DL — HIGH (ref 70–99)
GLUCOSE BLDC GLUCOMTR-MCNC: 141 MG/DL — HIGH (ref 70–99)
GLUCOSE BLDC GLUCOMTR-MCNC: 219 MG/DL — HIGH (ref 70–99)
GLUCOSE SERPL-MCNC: 112 MG/DL — HIGH (ref 70–99)
HAPTOGLOB SERPL-MCNC: 480 MG/DL — HIGH (ref 34–200)
HCT VFR BLD CALC: 17.7 % — CRITICAL LOW (ref 39–50)
HCT VFR BLD CALC: 21.5 % — LOW (ref 39–50)
HCT VFR BLD CALC: 21.8 % — LOW (ref 39–50)
HGB BLD-MCNC: 5.3 G/DL — CRITICAL LOW (ref 13–17)
HGB BLD-MCNC: 6.6 G/DL — CRITICAL LOW (ref 13–17)
HGB BLD-MCNC: 6.7 G/DL — CRITICAL LOW (ref 13–17)
LDH SERPL L TO P-CCNC: 127 U/L — SIGNIFICANT CHANGE UP (ref 50–242)
MCHC RBC-ENTMCNC: 24.2 PG — LOW (ref 27–34)
MCHC RBC-ENTMCNC: 24.4 PG — LOW (ref 27–34)
MCHC RBC-ENTMCNC: 24.6 PG — LOW (ref 27–34)
MCHC RBC-ENTMCNC: 29.9 GM/DL — LOW (ref 32–36)
MCHC RBC-ENTMCNC: 30.7 GM/DL — LOW (ref 32–36)
MCHC RBC-ENTMCNC: 30.7 GM/DL — LOW (ref 32–36)
MCV RBC AUTO: 78.8 FL — LOW (ref 80–100)
MCV RBC AUTO: 80.1 FL — SIGNIFICANT CHANGE UP (ref 80–100)
MCV RBC AUTO: 81.6 FL — SIGNIFICANT CHANGE UP (ref 80–100)
NRBC # BLD: 0 /100 WBCS — SIGNIFICANT CHANGE UP (ref 0–0)
PLATELET # BLD AUTO: 237 K/UL — SIGNIFICANT CHANGE UP (ref 150–400)
PLATELET # BLD AUTO: 342 K/UL — SIGNIFICANT CHANGE UP (ref 150–400)
PLATELET # BLD AUTO: 350 K/UL — SIGNIFICANT CHANGE UP (ref 150–400)
POTASSIUM SERPL-MCNC: 4.1 MMOL/L — SIGNIFICANT CHANGE UP (ref 3.5–5.3)
POTASSIUM SERPL-SCNC: 4.1 MMOL/L — SIGNIFICANT CHANGE UP (ref 3.5–5.3)
RBC # BLD: 2.17 M/UL — LOW (ref 4.2–5.8)
RBC # BLD: 2.72 M/UL — LOW (ref 4.2–5.8)
RBC # BLD: 2.73 M/UL — LOW (ref 4.2–5.8)
RBC # FLD: 20.4 % — HIGH (ref 10.3–14.5)
RBC # FLD: 20.6 % — HIGH (ref 10.3–14.5)
RBC # FLD: 21.2 % — HIGH (ref 10.3–14.5)
SODIUM SERPL-SCNC: 130 MMOL/L — LOW (ref 135–145)
WBC # BLD: 12.12 K/UL — HIGH (ref 3.8–10.5)
WBC # BLD: 18.12 K/UL — HIGH (ref 3.8–10.5)
WBC # BLD: 18.98 K/UL — HIGH (ref 3.8–10.5)
WBC # FLD AUTO: 12.12 K/UL — HIGH (ref 3.8–10.5)
WBC # FLD AUTO: 18.12 K/UL — HIGH (ref 3.8–10.5)
WBC # FLD AUTO: 18.98 K/UL — HIGH (ref 3.8–10.5)

## 2024-10-11 PROCEDURE — 99233 SBSQ HOSP IP/OBS HIGH 50: CPT

## 2024-10-11 RX ORDER — BISACODYL 5 MG/1
10 TABLET, COATED ORAL ONCE
Refills: 0 | Status: COMPLETED | OUTPATIENT
Start: 2024-10-11 | End: 2024-10-13

## 2024-10-11 RX ADMIN — Medication 2 TABLET(S): at 22:25

## 2024-10-11 RX ADMIN — CEFEPIME 100 MILLIGRAM(S): 2 INJECTION, POWDER, FOR SOLUTION INTRAVENOUS at 12:26

## 2024-10-11 RX ADMIN — FOLIC ACID 1 MILLIGRAM(S): 1 TABLET ORAL at 12:11

## 2024-10-11 RX ADMIN — Medication 17 GRAM(S): at 12:11

## 2024-10-11 RX ADMIN — Medication 25 MILLIGRAM(S): at 05:01

## 2024-10-11 RX ADMIN — Medication 650 MILLIGRAM(S): at 13:07

## 2024-10-11 RX ADMIN — Medication 81 MILLIGRAM(S): at 12:11

## 2024-10-11 RX ADMIN — ATORVASTATIN CALCIUM 10 MILLIGRAM(S): 10 TABLET, FILM COATED ORAL at 22:25

## 2024-10-11 RX ADMIN — QUETIAPINE FUMARATE 25 MILLIGRAM(S): 50 TABLET, FILM COATED ORAL at 22:25

## 2024-10-11 RX ADMIN — Medication 2 MILLIGRAM(S): at 22:25

## 2024-10-11 RX ADMIN — APIXABAN 5 MILLIGRAM(S): 5 TABLET, FILM COATED ORAL at 05:01

## 2024-10-11 RX ADMIN — PANTOPRAZOLE SODIUM 40 MILLIGRAM(S): 40 TABLET, DELAYED RELEASE ORAL at 05:01

## 2024-10-11 RX ADMIN — CEFEPIME 100 MILLIGRAM(S): 2 INJECTION, POWDER, FOR SOLUTION INTRAVENOUS at 23:10

## 2024-10-11 NOTE — PROGRESS NOTE ADULT - PROBLEM SELECTOR PLAN 2
likely Multifactorial malignancy, recent treatment, CKD  - noted to have another drop in h/h s/p 1 unit PRBC tx 2 days ago  - hemolysis labs unremarkable (pending haptoglobin)    --will tx 1 unit PRBC, f/u post tx CBC  - CHeck Ct abP non con (avoid IV cont given CKD) , however d/w PAtient's spouse today if concern remains high for RP bleed will likely need Ct ab/P IV cont, spouse in agreement w/ IV cont if needed

## 2024-10-11 NOTE — PROGRESS NOTE ADULT - SUBJECTIVE AND OBJECTIVE BOX
[facility] DIVISION OF INFECTIOUS DISEASES  BRADFORD Nolasco Y. Patel, S. Shah, G. Richard  [phone]  ([phone] - weekdays after 5pm and weekends)    Name: JESSIE GREER  Age/Gender: 69y Male  MRN: [id number]    Interval History:  Patient seen and examined this morning.   States he feels fine, denies feeling fever last night.   Denies chest pain, cough, dyspnea, abd pain, n/v/d, dysuria.   Notes reviewed  Allergies: No Known Allergies      Objective:  Vitals:   T(F): 97.9 (10-11-24 @ 04:29), Max: 101.3 (10-10-24 @ 20:19)  HR: 98 (10-11-24 @ 04:29) (79 - 98)  BP: 103/64 (10-11-24 @ 04:29) (101/66 - 126/73)  RR: 18 (10-11-24 @ 04:29) (18 - 18)  SpO2: 96% (10-11-24 @ 04:29) (96% - 100%)  Physical Examination:  General: no acute distress, nontoxic appearing   HEENT: NC/AT, anicteric, EOMI  Respiratory: clear to auscultation bilaterally   Cardiovascular: S1 and S2 present, normal rate   Gastrointestinal: soft, nontender, nondistended  Extremities: no edema, no cyanosis  Skin: no visible rash    Laboratory Studies:  CBC:                       6.7    18.98 )-----------( 350      ( 11 Oct 2024 07:21 )             21.8     WBC Trend:  18.98 10-11-24 @ 07:21  21.80 10-10-24 @ 07:35  19.05 10-10-24 @ 01:56  25.55 10-08-24 @ 09:57  23.71 10-08-24 @ 07:17  26.53 10-07-24 @ 07:33  30.60 10-06-24 @ 07:17  24.34 10-05-24 @ 07:10  26.70 10-04-24 @ 19:29    CMP: 10-11    130[L]  |  96  |  44[H]  ----------------------------<  112[H]  4.1   |  23  |  1.63[H]    Ca    9.7      11 Oct 2024 07:21      Creatinine: 1.63 mg/dL (10-11-24 @ 07:21)  Creatinine: 1.49 mg/dL (10-08-24 @ 07:16)  Creatinine: 1.57 mg/dL (10-07-24 @ 07:33)  Creatinine: 1.56 mg/dL (10-06-24 @ 07:21)  Creatinine: 1.88 mg/dL (10-05-24 @ 07:10)    Urinalysis (10.10.24 @ 14:09)    pH Urine: 5.5   Glucose Qualitative, Urine: Negative mg/dL   Blood, Urine: Negative   Color: Yellow   Urine Appearance: Clear   Bilirubin: Negative   Ketone - Urine: Negative mg/dL   Specific Gravity: 1.018   Protein, Urine: 30 mg/dL   Urobilinogen: 0.2 mg/dL   Nitrite: Negative   Leukocyte Esterase Concentration: Small  Urine Microscopic-Add On (NC) (10.10.24 @ 14:09)    Epithelial Cells: 3 /HPF   Cast: 4 /LPF   Red Blood Cell - Urine: 2 /HPF   White Blood Cell - Urine: 14 /HPF   Bacteria: Negative /HPF      Microbiology: reviewed   Culture - Urine (collected 10-02-24 @ 17:45)  Source: Clean Catch Clean Catch (Midstream)  Final Report (10-04-24 @ 03:53):    No growth    Culture - Blood (collected 10-02-24 @ 15:50)  Source: .Blood BLOOD  Final Report (10-07-24 @ 23:00):    No growth at 5 days    Culture - Blood (collected 10-02-24 @ 15:35)  Source: .Blood BLOOD  Final Report (10-07-24 @ 23:00):    No growth at 5 days    SARS-CoV-2 Result: NotDete (10 Oct 2024 10:24)    Radiology: reviewed   < from: Xray Chest 1 View- PORTABLE-Urgent (Xray Chest 1 View- PORTABLE-Urgent .) (10.10.24 @ 11:08) >  IMPRESSION:  Clear lungs.    < end of copied text >    Medications:  acetaminophen     Tablet .. 650 milliGRAM(s) Oral every 6 hours PRN  apixaban 5 milliGRAM(s) Oral every 12 hours  aspirin  chewable 81 milliGRAM(s) Oral daily  atorvastatin 10 milliGRAM(s) Oral at bedtime  cefepime   IVPB 2000 milliGRAM(s) IV Intermittent every 12 hours  cefepime   IVPB      dextrose 5%. 1000 milliLiter(s) IV Continuous <Continuous>  dextrose 5%. 1000 milliLiter(s) IV Continuous <Continuous>  dextrose 50% Injectable 25 Gram(s) IV Push once  dextrose 50% Injectable 12.5 Gram(s) IV Push once  dextrose 50% Injectable 25 Gram(s) IV Push once  dextrose Oral Gel 15 Gram(s) Oral once PRN  folic acid 1 milliGRAM(s) Oral daily  glucagon  Injectable 1 milliGRAM(s) IntraMuscular once  influenza  Vaccine (HIGH DOSE) 0.5 milliLiter(s) IntraMuscular once  insulin lispro (ADMELOG) corrective regimen sliding scale   SubCutaneous at bedtime  insulin lispro (ADMELOG) corrective regimen sliding scale   SubCutaneous three times a day before meals  lactated ringers. 1000 milliLiter(s) IV Continuous <Continuous>  melatonin 2 milliGRAM(s) Oral at bedtime  metoprolol tartrate 25 milliGRAM(s) Oral two times a day  pantoprazole    Tablet 40 milliGRAM(s) Oral before breakfast  polyethylene glycol 3350 17 Gram(s) Oral daily  QUEtiapine 25 milliGRAM(s) Oral at bedtime  senna 2 Tablet(s) Oral at bedtime    Current Antimicrobials:  cefepime   IVPB 2000 milliGRAM(s) IV Intermittent every 12 hours  cefepime   IVPB        Prior/Completed Antimicrobials:  cefepime   IVPB  piperacillin/tazobactam IVPB...

## 2024-10-11 NOTE — PROGRESS NOTE ADULT - SUBJECTIVE AND OBJECTIVE BOX
INTERVAL HPI/OVERNIGHT EVENTS:  Patient S&E at bedside. No o/n events,     VITAL SIGNS:  T(F): 97.9 (10-11-24 @ 04:29)  HR: 98 (10-11-24 @ 04:29)  BP: 103/64 (10-11-24 @ 04:29)  RR: 18 (10-11-24 @ 04:29)  SpO2: 96% (10-11-24 @ 04:29)  Wt(kg): --    PE  NAD  Awake, alert  Anicteric, MMM  Abd soft, NT, ND  No c/c/e  No rash grossly      MEDICATIONS  (STANDING):  apixaban 5 milliGRAM(s) Oral every 12 hours  aspirin  chewable 81 milliGRAM(s) Oral daily  atorvastatin 10 milliGRAM(s) Oral at bedtime  cefepime   IVPB 2000 milliGRAM(s) IV Intermittent every 12 hours  cefepime   IVPB      dextrose 5%. 1000 milliLiter(s) (50 mL/Hr) IV Continuous <Continuous>  dextrose 5%. 1000 milliLiter(s) (100 mL/Hr) IV Continuous <Continuous>  dextrose 50% Injectable 25 Gram(s) IV Push once  dextrose 50% Injectable 25 Gram(s) IV Push once  dextrose 50% Injectable 12.5 Gram(s) IV Push once  folic acid 1 milliGRAM(s) Oral daily  glucagon  Injectable 1 milliGRAM(s) IntraMuscular once  influenza  Vaccine (HIGH DOSE) 0.5 milliLiter(s) IntraMuscular once  insulin lispro (ADMELOG) corrective regimen sliding scale   SubCutaneous three times a day before meals  insulin lispro (ADMELOG) corrective regimen sliding scale   SubCutaneous at bedtime  lactated ringers. 1000 milliLiter(s) (80 mL/Hr) IV Continuous <Continuous>  melatonin 2 milliGRAM(s) Oral at bedtime  metoprolol tartrate 25 milliGRAM(s) Oral two times a day  pantoprazole    Tablet 40 milliGRAM(s) Oral before breakfast  polyethylene glycol 3350 17 Gram(s) Oral daily  QUEtiapine 25 milliGRAM(s) Oral at bedtime  senna 2 Tablet(s) Oral at bedtime    MEDICATIONS  (PRN):  acetaminophen     Tablet .. 650 milliGRAM(s) Oral every 6 hours PRN Temp greater or equal to 38C (100.4F)  dextrose Oral Gel 15 Gram(s) Oral once PRN Blood Glucose LESS THAN 70 milliGRAM(s)/deciliter      Allergies    No Known Allergies    Intolerances        LABS:                        8.0    21.80 )-----------( 363      ( 10 Oct 2024 07:35 )             26.6             Urinalysis Basic - ( 10 Oct 2024 14:09 )    Color: Yellow / Appearance: Clear / S.018 / pH: x  Gluc: x / Ketone: Negative mg/dL  / Bili: Negative / Urobili: 0.2 mg/dL   Blood: x / Protein: 30 mg/dL / Nitrite: Negative   Leuk Esterase: Small / RBC: 2 /HPF / WBC 14 /HPF   Sq Epi: x / Non Sq Epi: 3 /HPF / Bacteria: Negative /HPF        RADIOLOGY & ADDITIONAL TESTS:  Studies reviewed.   INTERVAL HPI/OVERNIGHT EVENTS:  Patient S&E at bedside. Overnight febrile to 101.3     VITAL SIGNS:  T(F): 97.9 (10-11-24 @ 04:29)  HR: 98 (10-11-24 @ 04:29)  BP: 103/64 (10-11-24 @ 04:29)  RR: 18 (10-11-24 @ 04:29)  SpO2: 96% (10-11-24 @ 04:29)  Wt(kg): --    PE  NAD  Awake, alert  Anicteric, MMM  Abd soft, NT, ND  No c/c/e  No rash grossly      MEDICATIONS  (STANDING):  apixaban 5 milliGRAM(s) Oral every 12 hours  aspirin  chewable 81 milliGRAM(s) Oral daily  atorvastatin 10 milliGRAM(s) Oral at bedtime  cefepime   IVPB 2000 milliGRAM(s) IV Intermittent every 12 hours  cefepime   IVPB      dextrose 5%. 1000 milliLiter(s) (50 mL/Hr) IV Continuous <Continuous>  dextrose 5%. 1000 milliLiter(s) (100 mL/Hr) IV Continuous <Continuous>  dextrose 50% Injectable 25 Gram(s) IV Push once  dextrose 50% Injectable 25 Gram(s) IV Push once  dextrose 50% Injectable 12.5 Gram(s) IV Push once  folic acid 1 milliGRAM(s) Oral daily  glucagon  Injectable 1 milliGRAM(s) IntraMuscular once  influenza  Vaccine (HIGH DOSE) 0.5 milliLiter(s) IntraMuscular once  insulin lispro (ADMELOG) corrective regimen sliding scale   SubCutaneous three times a day before meals  insulin lispro (ADMELOG) corrective regimen sliding scale   SubCutaneous at bedtime  lactated ringers. 1000 milliLiter(s) (80 mL/Hr) IV Continuous <Continuous>  melatonin 2 milliGRAM(s) Oral at bedtime  metoprolol tartrate 25 milliGRAM(s) Oral two times a day  pantoprazole    Tablet 40 milliGRAM(s) Oral before breakfast  polyethylene glycol 3350 17 Gram(s) Oral daily  QUEtiapine 25 milliGRAM(s) Oral at bedtime  senna 2 Tablet(s) Oral at bedtime    MEDICATIONS  (PRN):  acetaminophen     Tablet .. 650 milliGRAM(s) Oral every 6 hours PRN Temp greater or equal to 38C (100.4F)  dextrose Oral Gel 15 Gram(s) Oral once PRN Blood Glucose LESS THAN 70 milliGRAM(s)/deciliter      Allergies    No Known Allergies    Intolerances        LABS:                        8.0    21.80 )-----------( 363      ( 10 Oct 2024 07:35 )             26.6             Urinalysis Basic - ( 10 Oct 2024 14:09 )    Color: Yellow / Appearance: Clear / S.018 / pH: x  Gluc: x / Ketone: Negative mg/dL  / Bili: Negative / Urobili: 0.2 mg/dL   Blood: x / Protein: 30 mg/dL / Nitrite: Negative   Leuk Esterase: Small / RBC: 2 /HPF / WBC 14 /HPF   Sq Epi: x / Non Sq Epi: 3 /HPF / Bacteria: Negative /HPF        RADIOLOGY & ADDITIONAL TESTS:  Studies reviewed.

## 2024-10-11 NOTE — PROGRESS NOTE ADULT - SUBJECTIVE AND OBJECTIVE BOX
Patient is a 69y old  Male who presents with a chief complaint of AMS (11 Oct 2024 09:16)      SUBJECTIVE / OVERNIGHT EVENTS: Patient seen and examined at bedside. He feels well denies any complaints. overnight noted to have fever 101.3    ROS:  All other review of systems negative    Allergies    No Known Allergies    Intolerances        MEDICATIONS  (STANDING):  aspirin  chewable 81 milliGRAM(s) Oral daily  atorvastatin 10 milliGRAM(s) Oral at bedtime  cefepime   IVPB 2000 milliGRAM(s) IV Intermittent every 12 hours  cefepime   IVPB      dextrose 5%. 1000 milliLiter(s) (100 mL/Hr) IV Continuous <Continuous>  dextrose 5%. 1000 milliLiter(s) (50 mL/Hr) IV Continuous <Continuous>  dextrose 50% Injectable 25 Gram(s) IV Push once  dextrose 50% Injectable 12.5 Gram(s) IV Push once  dextrose 50% Injectable 25 Gram(s) IV Push once  folic acid 1 milliGRAM(s) Oral daily  glucagon  Injectable 1 milliGRAM(s) IntraMuscular once  influenza  Vaccine (HIGH DOSE) 0.5 milliLiter(s) IntraMuscular once  insulin lispro (ADMELOG) corrective regimen sliding scale   SubCutaneous three times a day before meals  insulin lispro (ADMELOG) corrective regimen sliding scale   SubCutaneous at bedtime  lactated ringers. 1000 milliLiter(s) (80 mL/Hr) IV Continuous <Continuous>  melatonin 2 milliGRAM(s) Oral at bedtime  metoprolol tartrate 25 milliGRAM(s) Oral two times a day  pantoprazole    Tablet 40 milliGRAM(s) Oral before breakfast  polyethylene glycol 3350 17 Gram(s) Oral daily  QUEtiapine 25 milliGRAM(s) Oral at bedtime  senna 2 Tablet(s) Oral at bedtime    MEDICATIONS  (PRN):  acetaminophen     Tablet .. 650 milliGRAM(s) Oral every 6 hours PRN Temp greater or equal to 38C (100.4F)  dextrose Oral Gel 15 Gram(s) Oral once PRN Blood Glucose LESS THAN 70 milliGRAM(s)/deciliter      Vital Signs Last 24 Hrs  T(C): 36.9 (11 Oct 2024 14:41), Max: 38.5 (10 Oct 2024 20:19)  T(F): 98.4 (11 Oct 2024 14:41), Max: 101.3 (10 Oct 2024 20:19)  HR: 95 (11 Oct 2024 14:41) (79 - 98)  BP: 98/59 (11 Oct 2024 14:41) (98/59 - 126/73)  BP(mean): --  RR: 18 (11 Oct 2024 14:41) (18 - 18)  SpO2: 95% (11 Oct 2024 14:41) (95% - 100%)    Parameters below as of 11 Oct 2024 14:41  Patient On (Oxygen Delivery Method): room air      CAPILLARY BLOOD GLUCOSE      POCT Blood Glucose.: 141 mg/dL (11 Oct 2024 11:59)  POCT Blood Glucose.: 134 mg/dL (11 Oct 2024 08:32)  POCT Blood Glucose.: 165 mg/dL (10 Oct 2024 21:13)  POCT Blood Glucose.: 124 mg/dL (10 Oct 2024 17:14)    I&O's Summary    10 Oct 2024 07:01  -  11 Oct 2024 07:00  --------------------------------------------------------  IN: 1100 mL / OUT: 450 mL / NET: 650 mL        PHYSICAL EXAM:  GENERAL: NAD, well-developed  HEAD:  Atraumatic, Normocephalic  EYES: EOMI, PERRLA, conjunctiva and sclera clear  NECK: Supple, No JVD  CHEST/LUNG: Clear to auscultation bilaterally; No wheeze  HEART: Regular rate and rhythm; No murmurs, rubs, or gallops  ABDOMEN: Soft, Nontender, Nondistended; Bowel sounds present  EXTREMITIES:  2+ Peripheral Pulses, No clubbing, cyanosis, or edema  NEUROLOGY: AAOx3, intermittently confused     LABS:                        6.6    18.12 )-----------( 342      ( 11 Oct 2024 09:49 )             21.5     10-11    130[L]  |  96  |  44[H]  ----------------------------<  112[H]  4.1   |  23  |  1.63[H]    Ca    9.7      11 Oct 2024 07:21    TPro  x   /  Alb  x   /  TBili  0.4  /  DBili  0.2  /  AST  x   /  ALT  x   /  AlkPhos  x   10-11          Urinalysis Basic - ( 11 Oct 2024 07:21 )    Color: x / Appearance: x / SG: x / pH: x  Gluc: 112 mg/dL / Ketone: x  / Bili: x / Urobili: x   Blood: x / Protein: x / Nitrite: x   Leuk Esterase: x / RBC: x / WBC x   Sq Epi: x / Non Sq Epi: x / Bacteria: x        RADIOLOGY & ADDITIONAL TESTS:    Care Discussed with Consultants/Other Providers: Dr. Brooke and Dr. Villatoro     Case Discussed with Patient's wife at bedside in detail

## 2024-10-11 NOTE — PROGRESS NOTE ADULT - ASSESSMENT
69 year old male with HTN, HLD, T2DM, CAD and metastatic urothelial cancer (follows with [facility] [place]) c/b tumor invasion and hydronephrosis, and recent admission for YANIRA on CKD that improved with Nolen catheter placement presenting for AMS and leukocytosis noted during [facility] appt Tuesday. Wife reported nolen was removed during that appt. Patient in the ER with tachycardia, leukocytosis ~25k with no fever and was started on empiric zosyn.     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  H/o metastatic urothelial cancer c/b tumor invasion of L ureter and hydronephrosis on chemotherapy   - CTAP with emphysematous cystitis; L renal mass with associated hydronephrosis with no change; interval increase in size of one of the perinephric soft tissue implants   - Urology eval appreciated, recs noted   - UA with no significant pyuria; Ucx negative   - prior culture reviewed-no positive cultures noted   - 10/6 all micro remains negative, WBC elevation to 30k and sig above baseline  - 10/9 evening with fever x1 in between transfusion (was given 1/2 PRBC prior to fever, and then 2nd half after temp normalized)-may be transfusion related, rule out infectious cause   - COVID/Flu/RSV negative    - CXR with no pneumonia    - UA with only 14 WBCs, no bacteria, no gu sx at this time   - 10/10 pm with fever -- pt asymptomatic, WBC downtrending, no new focal findings on exam, nontoxic     s/p zosyn 10/2-10/4  s/p ceftriaxone 10/4-10/10    Recommendations:   Follow blood cultures - in process   Check CT C/A/P to eval for possible source   Continue cefepime for now   Monitor temps/CBC, Cr   Continue rest of care per primary team     D/w Dr. Yimi Brooke M.D.  [facility], Division of Infectious Diseases  [phone]  After 5pm on weekdays and all day on weekends - please call [phone]  Available on [employer] TEAMS

## 2024-10-11 NOTE — PROGRESS NOTE ADULT - PROBLEM SELECTOR PLAN 1
On admission, patient was confused, and tachycardic with WBC count of 25. He was recently admitted for YANIRA on CKD, s/p Elias placement. On Zosyn for sepsis likely 2/2 UTI given mental status and recent Elias placement. UA neg. Hemodynamically stable, afebrile. No abdominal tenderness on exa  - CT abd/pelvis -> emphysematous cystitis   - lactate normalized, blood/ urine cx neg   - another episode of fever, c/w cefepime check CT C/A/P, UA neg, Blood cx (pending result), CXR wnl

## 2024-10-11 NOTE — PROVIDER CONTACT NOTE (CRITICAL VALUE NOTIFICATION) - BACKGROUND
hgb 6.8
vyvanse was too expensive and he was changed to methylphenidate 27mg daily - that was filled on 5/14/20.  Please get more information.    
pt A&Ox2 some confusion admitted for AMS- UTI tx with antibiotics
Pt admitted for YANIRA and had a drop in his hemoglobin and hematocrit.

## 2024-10-11 NOTE — PROVIDER CONTACT NOTE (CRITICAL VALUE NOTIFICATION) - SITUATION
pt admitted 10/2 with change of mental status
pt lab results hgb 6.7
Pt's CBC came back as Hemoglobin 5.3 and hematocrit 17.7

## 2024-10-11 NOTE — PROVIDER CONTACT NOTE (CRITICAL VALUE NOTIFICATION) - ASSESSMENT
Pt A&Ox4.  All VS as charted.  Pt not complaining of any pain or discomfort.  Pt denies lethargy, or dizziness.
pt lying in bed in nAD
pt hgb 6.7

## 2024-10-11 NOTE — PROGRESS NOTE ADULT - ASSESSMENT
69-year-old male w/ PMH of HTN, HLD, T2DM, CAD and metastatic urothelial CA (follows with [facility] [place]) c/b tumor invasion and hydronephrosis, and recent admission for YANIRA on CKD that improved with Elias catheter placement presenting for mild confusion and fatigue since discharge. In the ED. patient was afebrile but tachycardic to 102. Labs significant for WBC of 25.43 and UA indeterminate. Patient admitted for AMS iso likely sepsis 2/2 UTI.

## 2024-10-11 NOTE — PROGRESS NOTE ADULT - ASSESSMENT
Metastatic urothelial cancer  --Previously on gem carbo x 6 through 12/2023 -> avelumab since 2/2024 then held since 6/18/24 due to CRI, anemia, dizziness. PET 7/2024 shows PD at multiple sites. Started treatment with enfortumab vedotin, LD 9/13/24.  --Follows with Dr. Herring at [facility].  --No plan for treatment while admitted    AMS (resolved)  Leukocytosis   --CT a/p w/ emphysematous cystitis, ID following  --on IV abx, plan to transition to cefpodoxime 200mg BID for discharge however, febrile overnight 10/10 - started Cefepime, Blood Cx repeated on 10/10- pending. Consider repeat urine culture as well- WBCs in UA but Nitrite negetive small LE. CXR negative. COVID/Flu/RSV negative and no respiratory symptoms. No diarrhea or other obvious signs/sources of infection.   --blood and urine cultures NTD    hx Hydronephrosis/ YANIRA  --Cr 1.59 on admission, Elias removed. Cr stable at 1.49  --2/2 disease burden  --CT a/p 9/24/24: Similar size of dominant 7 cm left renal mass. Increased size of right hepatic lobe lesions. Increased and decreased size of left perinephric masses. Decreased size of left para-aortic lymph node. 0.4 cm distal right ureteral stone, with proximal hydroureter and perivesicular fat stranding.  --repeat CT a/p w/ Emphysematous cystitis. Left renal mass with associated hydronephrosis similar to prior exam. Interval increase in size of one of the perinephric soft tissue implant    -- last CMP on 10/8, would recommend to continue to trend with his daily labs given hyperCa- h/o hypoNa, and YANIRA    Hypercalcemia  --Ca 11.3 on 10/7--> 10.3 on repeat s/p fluids  --ionized calcium still elevated at 1.45 on 10/8  --albumin-corrected calcium calculated  at 11.66 on 10/8, s/p Pamidronate     Anemia   --Multifactorial d/t malignancy, treatment, CKD  --Hgb 7.5-9.5 at baseline  --Transfuse for Hgb <7, 8 if symptomatic  --folate low (4.4), %sat c/w KE (12)--> folic acid ordered, consider PO iron  --S/p two half units pRBC overnight, hgb 8  --Recommend transfusion medicine consult for possible transfusion reaction work up (call blood bank)     hx DVT  --c/w Eliquis    will follow,    Naveed Villatoro MD   Hematology/Oncology  [facility]  [phone] (office)   Metastatic urothelial cancer  --Previously on gem carbo x 6 through 12/2023 -> avelumab since 2/2024 then held since 6/18/24 due to CRI, anemia, dizziness. PET 7/2024 shows PD at multiple sites. Started treatment with enfortumab vedotin, LD 9/13/24.  --Follows with Dr. Herring at [facility].  --No plan for treatment while admitted    AMS (resolved)  Leukocytosis   --CT a/p w/ emphysematous cystitis, ID following  --on IV abx, plan to transition to cefpodoxime 200mg BID for discharge however, febrile overnight 10/10 and 10/11 - started Cefepime, Blood Cx repeated on 10/10- pending. Consider repeat urine culture as well- WBCs in UA but Nitrite negetive small LE. CXR negative. COVID/Flu/RSV negative and no respiratory symptoms. No diarrhea or other obvious signs/sources of infection.   --blood and urine cultures NTD  - ID follow up     hx Hydronephrosis/ YANIRA  --Cr 1.59 on admission, Elias removed. Cr stable at 1.49  --2/2 disease burden  --CT a/p 9/24/24: Similar size of dominant 7 cm left renal mass. Increased size of right hepatic lobe lesions. Increased and decreased size of left perinephric masses. Decreased size of left para-aortic lymph node. 0.4 cm distal right ureteral stone, with proximal hydroureter and perivesicular fat stranding.  --repeat CT a/p w/ Emphysematous cystitis. Left renal mass with associated hydronephrosis similar to prior exam. Interval increase in size of one of the perinephric soft tissue implant    -- last CMP on 10/8, would recommend to continue to trend with his daily labs given hyperCa- h/o hypoNa, and YANIRA: labs are pending     Hypercalcemia  --Ca 11.3 on 10/7--> 10.3 on repeat s/p fluids  --ionized calcium still elevated at 1.45 on 10/8  --albumin-corrected calcium calculated  at 11.66 on 10/8, s/p Pamidronate     Anemia   --Multifactorial d/t malignancy, treatment, CKD  --Hgb 7.5-9.5 at baseline  --Transfuse for Hgb <7, 8 if symptomatic  --folate low (4.4), %sat c/w KE (12)--> folic acid ordered, consider PO iron  --S/p two half units pRBC overnight, hgb 8  --Recommend transfusion medicine consult for possible transfusion reaction work up (call blood bank) however given two nights with fevers likely more infectious and not transfusion related     hx DVT  --c/w Eliquis    will follow,    Naveed Villatoro MD   Hematology/Oncology  [facility]  [phone] (office)

## 2024-10-12 LAB
ALBUMIN SERPL ELPH-MCNC: 2.5 G/DL — LOW (ref 3.3–5)
ALP SERPL-CCNC: 234 U/L — HIGH (ref 40–120)
ALT FLD-CCNC: 45 U/L — SIGNIFICANT CHANGE UP (ref 10–45)
ANION GAP SERPL CALC-SCNC: 13 MMOL/L — SIGNIFICANT CHANGE UP (ref 5–17)
AST SERPL-CCNC: 40 U/L — SIGNIFICANT CHANGE UP (ref 10–40)
BILIRUB SERPL-MCNC: 0.5 MG/DL — SIGNIFICANT CHANGE UP (ref 0.2–1.2)
BUN SERPL-MCNC: 36 MG/DL — HIGH (ref 7–23)
CALCIUM SERPL-MCNC: 9.4 MG/DL — SIGNIFICANT CHANGE UP (ref 8.4–10.5)
CHLORIDE SERPL-SCNC: 96 MMOL/L — SIGNIFICANT CHANGE UP (ref 96–108)
CO2 SERPL-SCNC: 22 MMOL/L — SIGNIFICANT CHANGE UP (ref 22–31)
CREAT SERPL-MCNC: 1.47 MG/DL — HIGH (ref 0.5–1.3)
EGFR: 51 ML/MIN/1.73M2 — LOW
GLUCOSE BLDC GLUCOMTR-MCNC: 117 MG/DL — HIGH (ref 70–99)
GLUCOSE BLDC GLUCOMTR-MCNC: 123 MG/DL — HIGH (ref 70–99)
GLUCOSE BLDC GLUCOMTR-MCNC: 137 MG/DL — HIGH (ref 70–99)
GLUCOSE BLDC GLUCOMTR-MCNC: 140 MG/DL — HIGH (ref 70–99)
GLUCOSE SERPL-MCNC: 104 MG/DL — HIGH (ref 70–99)
HCT VFR BLD CALC: 24.7 % — LOW (ref 39–50)
HCT VFR BLD CALC: 24.8 % — LOW (ref 39–50)
HGB BLD-MCNC: 7.6 G/DL — LOW (ref 13–17)
HGB BLD-MCNC: 7.7 G/DL — LOW (ref 13–17)
MAGNESIUM SERPL-MCNC: 1.7 MG/DL — SIGNIFICANT CHANGE UP (ref 1.6–2.6)
MCHC RBC-ENTMCNC: 24.1 PG — LOW (ref 27–34)
MCHC RBC-ENTMCNC: 24.4 PG — LOW (ref 27–34)
MCHC RBC-ENTMCNC: 30.8 GM/DL — LOW (ref 32–36)
MCHC RBC-ENTMCNC: 31 GM/DL — LOW (ref 32–36)
MCV RBC AUTO: 78.2 FL — LOW (ref 80–100)
MCV RBC AUTO: 78.5 FL — LOW (ref 80–100)
NRBC # BLD: 0 /100 WBCS — SIGNIFICANT CHANGE UP (ref 0–0)
NRBC # BLD: 0 /100 WBCS — SIGNIFICANT CHANGE UP (ref 0–0)
PHOSPHATE SERPL-MCNC: 1.4 MG/DL — LOW (ref 2.5–4.5)
PLATELET # BLD AUTO: 354 K/UL — SIGNIFICANT CHANGE UP (ref 150–400)
PLATELET # BLD AUTO: 355 K/UL — SIGNIFICANT CHANGE UP (ref 150–400)
POTASSIUM SERPL-MCNC: 4.3 MMOL/L — SIGNIFICANT CHANGE UP (ref 3.5–5.3)
POTASSIUM SERPL-SCNC: 4.3 MMOL/L — SIGNIFICANT CHANGE UP (ref 3.5–5.3)
PROT SERPL-MCNC: 6.4 G/DL — SIGNIFICANT CHANGE UP (ref 6–8.3)
RBC # BLD: 3.16 M/UL — LOW (ref 4.2–5.8)
RBC # BLD: 3.16 M/UL — LOW (ref 4.2–5.8)
RBC # FLD: 20.5 % — HIGH (ref 10.3–14.5)
RBC # FLD: 21 % — HIGH (ref 10.3–14.5)
SODIUM SERPL-SCNC: 131 MMOL/L — LOW (ref 135–145)
WBC # BLD: 17.83 K/UL — HIGH (ref 3.8–10.5)
WBC # BLD: 18.02 K/UL — HIGH (ref 3.8–10.5)
WBC # FLD AUTO: 17.83 K/UL — HIGH (ref 3.8–10.5)
WBC # FLD AUTO: 18.02 K/UL — HIGH (ref 3.8–10.5)

## 2024-10-12 PROCEDURE — 74177 CT ABD & PELVIS W/CONTRAST: CPT | Mod: 26

## 2024-10-12 PROCEDURE — 99233 SBSQ HOSP IP/OBS HIGH 50: CPT

## 2024-10-12 RX ORDER — SOD PHOS DI, MONO/K PHOS MONO 250 MG
1 TABLET ORAL ONCE
Refills: 0 | Status: COMPLETED | OUTPATIENT
Start: 2024-10-12 | End: 2024-10-12

## 2024-10-12 RX ADMIN — Medication 2 MILLIGRAM(S): at 21:31

## 2024-10-12 RX ADMIN — PANTOPRAZOLE SODIUM 40 MILLIGRAM(S): 40 TABLET, DELAYED RELEASE ORAL at 05:52

## 2024-10-12 RX ADMIN — Medication 2 TABLET(S): at 21:31

## 2024-10-12 RX ADMIN — Medication 25 MILLIGRAM(S): at 17:32

## 2024-10-12 RX ADMIN — QUETIAPINE FUMARATE 25 MILLIGRAM(S): 50 TABLET, FILM COATED ORAL at 21:31

## 2024-10-12 RX ADMIN — Medication 17 GRAM(S): at 11:26

## 2024-10-12 RX ADMIN — Medication 81 MILLIGRAM(S): at 11:26

## 2024-10-12 RX ADMIN — ATORVASTATIN CALCIUM 10 MILLIGRAM(S): 10 TABLET, FILM COATED ORAL at 21:31

## 2024-10-12 RX ADMIN — CEFEPIME 100 MILLIGRAM(S): 2 INJECTION, POWDER, FOR SOLUTION INTRAVENOUS at 10:34

## 2024-10-12 RX ADMIN — CEFEPIME 100 MILLIGRAM(S): 2 INJECTION, POWDER, FOR SOLUTION INTRAVENOUS at 21:30

## 2024-10-12 RX ADMIN — Medication 1 PACKET(S): at 10:31

## 2024-10-12 RX ADMIN — FOLIC ACID 1 MILLIGRAM(S): 1 TABLET ORAL at 11:26

## 2024-10-12 NOTE — PROGRESS NOTE ADULT - ASSESSMENT
69 year old male with HTN, HLD, T2DM, CAD and metastatic urothelial cancer (follows with [facility]) c/b tumor invasion and hydronephrosis, and recent admission for YANIRA on CKD that improved with Nolen catheter placement presenting for AMS and leukocytosis noted during [facility] appt Tuesday. Wife reported nolen was removed during that appt. Patient in the ER with tachycardia, leukocytosis ~25k with no fever and was started on empiric zosyn.     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  H/o metastatic urothelial cancer c/b tumor invasion of L ureter and hydronephrosis on chemotherapy   - CTAP with emphysematous cystitis; L renal mass with associated hydronephrosis with no change; interval increase in size of one of the perinephric soft tissue implants   - Urology eval appreciated, recs noted   - UA with no significant pyuria; Ucx negative   - prior culture reviewed-no positive cultures noted   - 10/6 all micro remains negative, WBC elevation to 30k and sig above baseline  - 10/9 evening with fever x1 in between transfusion (was given 1/2 PRBC prior to fever, and then 2nd half after temp normalized)-may be transfusion related, rule out infectious cause   - COVID/Flu/RSV negative    - CXR with no pneumonia    - UA with only 14 WBCs, no bacteria, no gu sx at this time   - 10/10 pm with fever -- pt asymptomatic, WBC downtrending  - 10/12 - now afebrile >24h, WBC stable; s/p 1U PRBC yesterday   no new infectious source found to date, nontoxic     s/p zosyn 10/2-10/4  s/p ceftriaxone 10/4-10/10    Recommendations:   Follow blood cultures - NGTD x2    Can hold off on CT C/A/P given afebrile now  Continue cefepime for now   If cx remain negative and if planned for d/c, can transition to cefpodoxime 200mg PO Q12h to complete 14 days on 10/15   Monitor temps/CBC, Cr   Continue rest of care per primary team       Talib Brooke M.D.  [facility], Division of Infectious Diseases  [phone]  After 5pm on weekdays and all day on weekends - please call [phone]  Available on [employer] TEAMS

## 2024-10-12 NOTE — PROGRESS NOTE ADULT - SUBJECTIVE AND OBJECTIVE BOX
Patient is a 69y old  Male who presents with a chief complaint of AMS (12 Oct 2024 11:41)      SUBJECTIVE / OVERNIGHT EVENTS: Patient seen and examined at bedside. States that he feels ok. overnight h/h did not respond well to 1 unit Hb, received 1 more uint overnight.     ROS:  All other review of systems negative    Allergies    No Known Allergies    Intolerances        MEDICATIONS  (STANDING):  aspirin  chewable 81 milliGRAM(s) Oral daily  atorvastatin 10 milliGRAM(s) Oral at bedtime  bisacodyl Suppository 10 milliGRAM(s) Rectal once  cefepime   IVPB      cefepime   IVPB 2000 milliGRAM(s) IV Intermittent every 12 hours  dextrose 5%. 1000 milliLiter(s) (100 mL/Hr) IV Continuous <Continuous>  dextrose 5%. 1000 milliLiter(s) (50 mL/Hr) IV Continuous <Continuous>  dextrose 50% Injectable 12.5 Gram(s) IV Push once  dextrose 50% Injectable 25 Gram(s) IV Push once  dextrose 50% Injectable 25 Gram(s) IV Push once  folic acid 1 milliGRAM(s) Oral daily  glucagon  Injectable 1 milliGRAM(s) IntraMuscular once  influenza  Vaccine (HIGH DOSE) 0.5 milliLiter(s) IntraMuscular once  insulin lispro (ADMELOG) corrective regimen sliding scale   SubCutaneous three times a day before meals  insulin lispro (ADMELOG) corrective regimen sliding scale   SubCutaneous at bedtime  lactated ringers. 1000 milliLiter(s) (80 mL/Hr) IV Continuous <Continuous>  melatonin 2 milliGRAM(s) Oral at bedtime  metoprolol tartrate 25 milliGRAM(s) Oral two times a day  pantoprazole    Tablet 40 milliGRAM(s) Oral before breakfast  polyethylene glycol 3350 17 Gram(s) Oral daily  QUEtiapine 25 milliGRAM(s) Oral at bedtime  senna 2 Tablet(s) Oral at bedtime    MEDICATIONS  (PRN):  acetaminophen     Tablet .. 650 milliGRAM(s) Oral every 6 hours PRN Temp greater or equal to 38C (100.4F)  dextrose Oral Gel 15 Gram(s) Oral once PRN Blood Glucose LESS THAN 70 milliGRAM(s)/deciliter      Vital Signs Last 24 Hrs  T(C): 36.8 (12 Oct 2024 09:12), Max: 37.2 (11 Oct 2024 13:42)  T(F): 98.2 (12 Oct 2024 09:12), Max: 99 (11 Oct 2024 13:42)  HR: 98 (12 Oct 2024 09:12) (94 - 99)  BP: 108/65 (12 Oct 2024 09:12) (98/59 - 110/69)  BP(mean): --  RR: 18 (12 Oct 2024 09:12) (18 - 18)  SpO2: 94% (12 Oct 2024 09:12) (94% - 96%)    Parameters below as of 12 Oct 2024 09:12  Patient On (Oxygen Delivery Method): room air      CAPILLARY BLOOD GLUCOSE      POCT Blood Glucose.: 140 mg/dL (12 Oct 2024 12:42)  POCT Blood Glucose.: 123 mg/dL (12 Oct 2024 08:25)  POCT Blood Glucose.: 124 mg/dL (11 Oct 2024 21:35)  POCT Blood Glucose.: 219 mg/dL (11 Oct 2024 17:10)    I&O's Summary    11 Oct 2024 07:01  -  12 Oct 2024 07:00  --------------------------------------------------------  IN: 1200 mL / OUT: 150 mL / NET: 1050 mL    12 Oct 2024 07:01  -  12 Oct 2024 12:49  --------------------------------------------------------  IN: 0 mL / OUT: 400 mL / NET: -400 mL        PHYSICAL EXAM:  GENERAL: NAD, well-developed  HEAD:  Atraumatic, Normocephalic  EYES: EOMI, PERRLA, conjunctiva and sclera clear  NECK: Supple, No JVD  CHEST/LUNG: Clear to auscultation bilaterally; No wheeze  HEART: Regular rate and rhythm; No murmurs, rubs, or gallops  ABDOMEN: Soft, Nontender, Nondistended; Bowel sounds present  EXTREMITIES:  2+ Peripheral Pulses, No clubbing, cyanosis, or edema  NEUROLOGY: AAOx3, non-focal    LABS:                        7.7    18.02 )-----------( 355      ( 12 Oct 2024 07:12 )             24.8     10-12    131[L]  |  96  |  36[H]  ----------------------------<  104[H]  4.3   |  22  |  1.47[H]    Ca    9.4      12 Oct 2024 07:12  Phos  1.4     10-12  Mg     1.7     10-12    TPro  6.4  /  Alb  2.5[L]  /  TBili  0.5  /  DBili  x   /  AST  40  /  ALT  45  /  AlkPhos  234[H]  10-12          Urinalysis Basic - ( 12 Oct 2024 07:12 )    Color: x / Appearance: x / SG: x / pH: x  Gluc: 104 mg/dL / Ketone: x  / Bili: x / Urobili: x   Blood: x / Protein: x / Nitrite: x   Leuk Esterase: x / RBC: x / WBC x   Sq Epi: x / Non Sq Epi: x / Bacteria: x        RADIOLOGY & ADDITIONAL TESTS:    Care Discussed with Consultants/Other Providers: Medicine ACP     Case Discussed with wife (agree able for CTA w/ cont)

## 2024-10-12 NOTE — PROGRESS NOTE ADULT - ASSESSMENT
69-year-old male w/ PMH of HTN, HLD, T2DM, CAD and metastatic urothelial CA (follows with [facility] [place]) c/b tumor invasion and hydronephrosis, and recent admission for YANIRA on CKD that improved with Elias catheter placement presenting for mild confusion and fatigue since discharge. In the ED. patient was afebrile but tachycardic to 102. Labs significant for WBC of 25.43 and UA indeterminate. Patient admitted for AMS iso likely sepsis 2/2 UTI.

## 2024-10-12 NOTE — PROGRESS NOTE ADULT - ASSESSMENT
Metastatic urothelial cancer  --Previously on gem carbo x 6 through 12/2023 -> avelumab since 2/2024 then held since 6/18/24 due to CRI, anemia, dizziness. PET 7/2024 shows PD at multiple sites. Started treatment with enfortumab vedotin, LD 9/13/24.  --Follows with Dr. Herring at [facility] [place].  --No plan for treatment while admitted    AMS (resolved)  Leukocytosis   --CT a/p w/ emphysematous cystitis  --Febrile 10/10 and 10/11 - continues Cefepime, Blood Cx repeated on 10/10- NGTD. Consider repeat urine culture as well- WBCs in UA but Nitrite negative small LE. CXR negative. COVID/Flu/RSV negative and no respiratory symptoms. No diarrhea or other obvious signs/sources of infection.   --Awaiting CT AP  --Plan to continue antibiotics to complete 14 days on 10/15     hx Hydronephrosis/ YANIRA  --Cr 1.59 on admission, Elias removed. Cr stable at 1.47  --2/2 disease burden  --CT a/p 9/24/24: Similar size of dominant 7 cm left renal mass. Increased size of right hepatic lobe lesions. Increased and decreased size of left perinephric masses. Decreased size of left para-aortic lymph node. 0.4 cm distal right ureteral stone, with proximal hydroureter and perivesicular fat stranding.  --repeat CT a/p w/ Emphysematous cystitis. Left renal mass with associated hydronephrosis similar to prior exam. Interval increase in size of one of the perinephric soft tissue implant    Hypercalcemia  --Ca 11.3 on 10/7; ionized calcium still elevated at 1.45 on 10/8. S/p fluids, Pamidronate on 10/8, levels normalized.     Anemia   --Multifactorial d/t malignancy, treatment, CKD  --Hgb 7.5-9.5 at baseline  --Transfuse for Hgb <7, 8 if symptomatic  --folate low (4.4), %sat c/w KE (12)--> folic acid ordered, consider PO iron  --Received two units pRBC on 10/11. Hgb 7.7 today.  --Hemolysis labs negative.    hx DVT  --c/w Eliquis    will follow,    Buddy Bond PA-C  Hematology/Oncology  [facility]  [phone] (office)

## 2024-10-12 NOTE — PROGRESS NOTE ADULT - PROBLEM SELECTOR PLAN 1
On admission, patient was confused, and tachycardic with WBC count of 25. He was recently admitted for YANIRA on CKD, s/p Elias placement. On Zosyn for sepsis likely 2/2 UTI given mental status and recent Elias placement. UA neg. Hemodynamically stable, afebrile. No abdominal tenderness on exa  - CT abd/pelvis -> emphysematous cystitis   - lactate normalized, blood/ urine cx neg   - another episode of fever, c/w cefepime check CT C/A/P, UA neg, Blood cx (ngtd), CXR wnl  - c/w cefepime for now (last day of abx 10/15)

## 2024-10-12 NOTE — PROGRESS NOTE ADULT - SUBJECTIVE AND OBJECTIVE BOX
[facility] DIV[facility] INFECTIOUS DISEASES  BR[place] Nolasco Y. Patel, S. Shah, G. Richard  [phone]  ([phone] - weekdays after 5pm and weekends)    Name: JESSIE GREER  Age/Gender: 69y Male  MRN: [id number]    Interval History:  Patient seen and examined this morning.   Feels well, denies fever or any new complaints  Notes reviewed.   No concerning overnight events.  Afebrile.   Allergies: No Known Allergies      Objective:  Vitals:   T(F): 98.2 (10-12-24 @ 09:12), Max: 99 (10-11-24 @ 13:42)  HR: 98 (10-12-24 @ 09:12) (94 - 99)  BP: 108/65 (10-12-24 @ 09:12) (98/59 - 110/69)  RR: 18 (10-12-24 @ 09:12) (18 - 18)  SpO2: 94% (10-12-24 @ 09:12) (94% - 96%)  Physical Examination:  General: no acute distress, nontoxic   HEENT: NC/AT, EOMI, anicteric  Cardio: S1, S2 present, normal rate  Resp: clear to auscultation bilaterally  Abd: soft, nontender, nondistended   Neuro: AAOx3, no obvious focal deficits  Ext: no edema, no cyanosis  Skin: warm, dry, no visible rash  Lines: PIV    Laboratory Studies:  CBC:                       7.7    18.02 )-----------( 355      ( 12 Oct 2024 07:12 )             24.8     WBC Trend:  18.02 10-12-24 @ 07:12  17.83 10-12-24 @ 00:14  12.12 10-11-24 @ 18:47  18.12 10-11-24 @ 09:49  18.98 10-11-24 @ 07:21  21.80 10-10-24 @ 07:35  19.05 10-10-24 @ 01:56  25.55 10-08-24 @ 09:57  23.71 10-08-24 @ 07:17  26.53 10-07-24 @ 07:33  30.60 10-06-24 @ 07:17    CMP: 10-12    131[L]  |  96  |  36[H]  ----------------------------<  104[H]  4.3   |  22  |  1.47[H]    Ca    9.4      12 Oct 2024 07:12  Phos  1.4     10-12  Mg     1.7     10-12    TPro  6.4  /  Alb  2.5[L]  /  TBili  0.5  /  DBili  x   /  AST  40  /  ALT  45  /  AlkPhos  234[H]  10-12    Creatinine: 1.47 mg/dL (10-12-24 @ 07:12)  Creatinine: 1.63 mg/dL (10-11-24 @ 07:21)  Creatinine: 1.49 mg/dL (10-08-24 @ 07:16)  Creatinine: 1.57 mg/dL (10-07-24 @ 07:33)  Creatinine: 1.56 mg/dL (10-06-24 @ 07:21)      LIVER FUNCTIONS - ( 12 Oct 2024 07:12 )  Alb: 2.5 g/dL / Pro: 6.4 g/dL / ALK PHOS: 234 U/L / ALT: 45 U/L / AST: 40 U/L / GGT: x           Microbiology: reviewed   Culture - Blood (collected 10-10-24 @ 13:04)  Source: .Blood BLOOD  Preliminary Report (10-11-24 @ 18:01):    No growth at 24 hours    Culture - Blood (collected 10-10-24 @ 12:54)  Source: .Blood BLOOD  Preliminary Report (10-11-24 @ 18:01):    No growth at 24 hours    Culture - Urine (collected 10-02-24 @ 17:45)  Source: Clean Catch Clean Catch (Midstream)  Final Report (10-04-24 @ 03:53):    No growth    Culture - Blood (collected 10-02-24 @ 15:50)  Source: .Blood BLOOD  Final Report (10-07-24 @ 23:00):    No growth at 5 days    Culture - Blood (collected 10-02-24 @ 15:35)  Source: .Blood BLOOD  Final Report (10-07-24 @ 23:00):    No growth at 5 days    SARS-CoV-2 Result: NotDetec (10 Oct 2024 10:24)    Radiology: reviewed     Medications:  acetaminophen     Tablet .. 650 milliGRAM(s) Oral every 6 hours PRN  aspirin  chewable 81 milliGRAM(s) Oral daily  atorvastatin 10 milliGRAM(s) Oral at bedtime  bisacodyl Suppository 10 milliGRAM(s) Rectal once  cefepime   IVPB      cefepime   IVPB 2000 milliGRAM(s) IV Intermittent every 12 hours  dextrose 5%. 1000 milliLiter(s) IV Continuous <Continuous>  dextrose 5%. 1000 milliLiter(s) IV Continuous <Continuous>  dextrose 50% Injectable 25 Gram(s) IV Push once  dextrose 50% Injectable 25 Gram(s) IV Push once  dextrose 50% Injectable 12.5 Gram(s) IV Push once  dextrose Oral Gel 15 Gram(s) Oral once PRN  folic acid 1 milliGRAM(s) Oral daily  glucagon  Injectable 1 milliGRAM(s) IntraMuscular once  influenza  Vaccine (HIGH DOSE) 0.5 milliLiter(s) IntraMuscular once  insulin lispro (ADMELOG) corrective regimen sliding scale   SubCutaneous three times a day before meals  insulin lispro (ADMELOG) corrective regimen sliding scale   SubCutaneous at bedtime  lactated ringers. 1000 milliLiter(s) IV Continuous <Continuous>  melatonin 2 milliGRAM(s) Oral at bedtime  metoprolol tartrate 25 milliGRAM(s) Oral two times a day  pantoprazole    Tablet 40 milliGRAM(s) Oral before breakfast  polyethylene glycol 3350 17 Gram(s) Oral daily  QUEtiapine 25 milliGRAM(s) Oral at bedtime  senna 2 Tablet(s) Oral at bedtime    Current Antimicrobials:  cefepime   IVPB 2000 milliGRAM(s) IV Intermittent every 12 hours  cefepime   IVPB        Prior/Completed Antimicrobials:  cefepime   IVPB  piperacillin/tazobactam IVPB...

## 2024-10-12 NOTE — PROGRESS NOTE ADULT - SUBJECTIVE AND OBJECTIVE BOX
Patient is a 69y old  Male who presents with a chief complaint of AMS (12 Oct 2024 09:26)    Patient seen and examined at bedside, no fever overnight.     MEDICATIONS  (STANDING):  aspirin  chewable 81 milliGRAM(s) Oral daily  atorvastatin 10 milliGRAM(s) Oral at bedtime  bisacodyl Suppository 10 milliGRAM(s) Rectal once  cefepime   IVPB 2000 milliGRAM(s) IV Intermittent every 12 hours  cefepime   IVPB      dextrose 5%. 1000 milliLiter(s) (50 mL/Hr) IV Continuous <Continuous>  dextrose 5%. 1000 milliLiter(s) (100 mL/Hr) IV Continuous <Continuous>  dextrose 50% Injectable 25 Gram(s) IV Push once  dextrose 50% Injectable 12.5 Gram(s) IV Push once  dextrose 50% Injectable 25 Gram(s) IV Push once  folic acid 1 milliGRAM(s) Oral daily  glucagon  Injectable 1 milliGRAM(s) IntraMuscular once  influenza  Vaccine (HIGH DOSE) 0.5 milliLiter(s) IntraMuscular once  insulin lispro (ADMELOG) corrective regimen sliding scale   SubCutaneous at bedtime  insulin lispro (ADMELOG) corrective regimen sliding scale   SubCutaneous three times a day before meals  lactated ringers. 1000 milliLiter(s) (80 mL/Hr) IV Continuous <Continuous>  melatonin 2 milliGRAM(s) Oral at bedtime  metoprolol tartrate 25 milliGRAM(s) Oral two times a day  pantoprazole    Tablet 40 milliGRAM(s) Oral before breakfast  polyethylene glycol 3350 17 Gram(s) Oral daily  QUEtiapine 25 milliGRAM(s) Oral at bedtime  senna 2 Tablet(s) Oral at bedtime    MEDICATIONS  (PRN):  acetaminophen     Tablet .. 650 milliGRAM(s) Oral every 6 hours PRN Temp greater or equal to 38C (100.4F)  dextrose Oral Gel 15 Gram(s) Oral once PRN Blood Glucose LESS THAN 70 milliGRAM(s)/deciliter    Vital Signs Last 24 Hrs  T(C): 36.8 (12 Oct 2024 09:12), Max: 37.2 (11 Oct 2024 13:42)  T(F): 98.2 (12 Oct 2024 09:12), Max: 99 (11 Oct 2024 13:42)  HR: 98 (12 Oct 2024 09:12) (94 - 99)  BP: 108/65 (12 Oct 2024 09:12) (98/59 - 110/69)  BP(mean): --  RR: 18 (12 Oct 2024 09:12) (18 - 18)  SpO2: 94% (12 Oct 2024 09:12) (94% - 96%)    Parameters below as of 12 Oct 2024 09:12  Patient On (Oxygen Delivery Method): room air    PE  NAD  Awake, alert  Anicteric, MMM  RRR  CTAB  Abd soft, NT, ND  No c/c/e  No rash grossly                        7.7    18.02 )-----------( 355      ( 12 Oct 2024 07:12 )             24.8       10-12    131[L]  |  96  |  36[H]  ----------------------------<  104[H]  4.3   |  22  |  1.47[H]    Ca    9.4      12 Oct 2024 07:12  Phos  1.4     10-12  Mg     1.7     10-12    TPro  6.4  /  Alb  2.5[L]  /  TBili  0.5  /  DBili  x   /  AST  40  /  ALT  45  /  AlkPhos  234[H]  10-12

## 2024-10-12 NOTE — PROGRESS NOTE ADULT - PROBLEM SELECTOR PLAN 2
likely Multifactorial malignancy, recent treatment, CKD  - noted to have another drop in h/h s/p 1 unit PRBC tx 2 days ago  - hemolysis labs unremarkable     --h/h did not respond well to 1 unit PRBC, another unite give overnight  - wife in agreement: CTA IV cont ordered to r/o RP bleed  - d/w nursing staff BM wnl no concern for GIB.    - AC on hold

## 2024-10-13 LAB
ALBUMIN SERPL ELPH-MCNC: 2 G/DL — LOW (ref 3.3–5)
ALP SERPL-CCNC: 239 U/L — HIGH (ref 40–120)
ALT FLD-CCNC: 38 U/L — SIGNIFICANT CHANGE UP (ref 10–45)
ANION GAP SERPL CALC-SCNC: 13 MMOL/L — SIGNIFICANT CHANGE UP (ref 5–17)
AST SERPL-CCNC: 30 U/L — SIGNIFICANT CHANGE UP (ref 10–40)
BILIRUB SERPL-MCNC: 0.5 MG/DL — SIGNIFICANT CHANGE UP (ref 0.2–1.2)
BUN SERPL-MCNC: 40 MG/DL — HIGH (ref 7–23)
CALCIUM SERPL-MCNC: 9.1 MG/DL — SIGNIFICANT CHANGE UP (ref 8.4–10.5)
CHLORIDE SERPL-SCNC: 96 MMOL/L — SIGNIFICANT CHANGE UP (ref 96–108)
CO2 SERPL-SCNC: 19 MMOL/L — LOW (ref 22–31)
CREAT SERPL-MCNC: 1.31 MG/DL — HIGH (ref 0.5–1.3)
EGFR: 59 ML/MIN/1.73M2 — LOW
GLUCOSE BLDC GLUCOMTR-MCNC: 121 MG/DL — HIGH (ref 70–99)
GLUCOSE BLDC GLUCOMTR-MCNC: 132 MG/DL — HIGH (ref 70–99)
GLUCOSE BLDC GLUCOMTR-MCNC: 136 MG/DL — HIGH (ref 70–99)
GLUCOSE BLDC GLUCOMTR-MCNC: 151 MG/DL — HIGH (ref 70–99)
GLUCOSE SERPL-MCNC: 106 MG/DL — HIGH (ref 70–99)
HCT VFR BLD CALC: 27.7 % — LOW (ref 39–50)
HGB BLD-MCNC: 8.2 G/DL — LOW (ref 13–17)
MCHC RBC-ENTMCNC: 24.1 PG — LOW (ref 27–34)
MCHC RBC-ENTMCNC: 29.6 GM/DL — LOW (ref 32–36)
MCV RBC AUTO: 81.5 FL — SIGNIFICANT CHANGE UP (ref 80–100)
NRBC # BLD: 0 /100 WBCS — SIGNIFICANT CHANGE UP (ref 0–0)
PHOSPHATE SERPL-MCNC: 1.7 MG/DL — LOW (ref 2.5–4.5)
PLATELET # BLD AUTO: 360 K/UL — SIGNIFICANT CHANGE UP (ref 150–400)
POTASSIUM SERPL-MCNC: 4.4 MMOL/L — SIGNIFICANT CHANGE UP (ref 3.5–5.3)
POTASSIUM SERPL-SCNC: 4.4 MMOL/L — SIGNIFICANT CHANGE UP (ref 3.5–5.3)
PROT SERPL-MCNC: 6.4 G/DL — SIGNIFICANT CHANGE UP (ref 6–8.3)
RBC # BLD: 3.4 M/UL — LOW (ref 4.2–5.8)
RBC # FLD: 21.4 % — HIGH (ref 10.3–14.5)
SODIUM SERPL-SCNC: 128 MMOL/L — LOW (ref 135–145)
WBC # BLD: 18.52 K/UL — HIGH (ref 3.8–10.5)
WBC # FLD AUTO: 18.52 K/UL — HIGH (ref 3.8–10.5)

## 2024-10-13 PROCEDURE — 99232 SBSQ HOSP IP/OBS MODERATE 35: CPT

## 2024-10-13 RX ORDER — SOD PHOS DI, MONO/K PHOS MONO 250 MG
1 TABLET ORAL ONCE
Refills: 0 | Status: COMPLETED | OUTPATIENT
Start: 2024-10-13 | End: 2024-10-13

## 2024-10-13 RX ORDER — APIXABAN 5 MG/1
5 TABLET, FILM COATED ORAL EVERY 12 HOURS
Refills: 0 | Status: DISCONTINUED | OUTPATIENT
Start: 2024-10-13 | End: 2024-10-14

## 2024-10-13 RX ADMIN — Medication 25 MILLIGRAM(S): at 18:07

## 2024-10-13 RX ADMIN — Medication 81 MILLIGRAM(S): at 11:14

## 2024-10-13 RX ADMIN — Medication 1 PACKET(S): at 11:14

## 2024-10-13 RX ADMIN — CEFEPIME 100 MILLIGRAM(S): 2 INJECTION, POWDER, FOR SOLUTION INTRAVENOUS at 11:15

## 2024-10-13 RX ADMIN — Medication 2 MILLIGRAM(S): at 21:40

## 2024-10-13 RX ADMIN — ATORVASTATIN CALCIUM 10 MILLIGRAM(S): 10 TABLET, FILM COATED ORAL at 21:40

## 2024-10-13 RX ADMIN — Medication 2 TABLET(S): at 21:40

## 2024-10-13 RX ADMIN — BISACODYL 10 MILLIGRAM(S): 5 TABLET, COATED ORAL at 11:36

## 2024-10-13 RX ADMIN — QUETIAPINE FUMARATE 25 MILLIGRAM(S): 50 TABLET, FILM COATED ORAL at 21:40

## 2024-10-13 RX ADMIN — FOLIC ACID 1 MILLIGRAM(S): 1 TABLET ORAL at 11:14

## 2024-10-13 RX ADMIN — PANTOPRAZOLE SODIUM 40 MILLIGRAM(S): 40 TABLET, DELAYED RELEASE ORAL at 05:02

## 2024-10-13 RX ADMIN — Medication 25 MILLIGRAM(S): at 05:01

## 2024-10-13 RX ADMIN — APIXABAN 5 MILLIGRAM(S): 5 TABLET, FILM COATED ORAL at 19:21

## 2024-10-13 RX ADMIN — Medication 17 GRAM(S): at 11:14

## 2024-10-13 NOTE — PROGRESS NOTE ADULT - SUBJECTIVE AND OBJECTIVE BOX
Patient is a 69y old  Male who presents with a chief complaint of AMS (13 Oct 2024 08:25)      SUBJECTIVE / OVERNIGHT EVENTS: PAtient seen and examined at bedside. States he feels well no events overnight.     ROS:  All other review of systems negative    Allergies    No Known Allergies    Intolerances        MEDICATIONS  (STANDING):  aspirin  chewable 81 milliGRAM(s) Oral daily  atorvastatin 10 milliGRAM(s) Oral at bedtime  cefepime   IVPB      cefepime   IVPB 2000 milliGRAM(s) IV Intermittent every 12 hours  dextrose 5%. 1000 milliLiter(s) (100 mL/Hr) IV Continuous <Continuous>  dextrose 5%. 1000 milliLiter(s) (50 mL/Hr) IV Continuous <Continuous>  dextrose 50% Injectable 25 Gram(s) IV Push once  dextrose 50% Injectable 25 Gram(s) IV Push once  dextrose 50% Injectable 12.5 Gram(s) IV Push once  folic acid 1 milliGRAM(s) Oral daily  glucagon  Injectable 1 milliGRAM(s) IntraMuscular once  influenza  Vaccine (HIGH DOSE) 0.5 milliLiter(s) IntraMuscular once  insulin lispro (ADMELOG) corrective regimen sliding scale   SubCutaneous at bedtime  insulin lispro (ADMELOG) corrective regimen sliding scale   SubCutaneous three times a day before meals  melatonin 2 milliGRAM(s) Oral at bedtime  metoprolol tartrate 25 milliGRAM(s) Oral two times a day  pantoprazole    Tablet 40 milliGRAM(s) Oral before breakfast  polyethylene glycol 3350 17 Gram(s) Oral daily  QUEtiapine 25 milliGRAM(s) Oral at bedtime  senna 2 Tablet(s) Oral at bedtime    MEDICATIONS  (PRN):  acetaminophen     Tablet .. 650 milliGRAM(s) Oral every 6 hours PRN Temp greater or equal to 38C (100.4F)  dextrose Oral Gel 15 Gram(s) Oral once PRN Blood Glucose LESS THAN 70 milliGRAM(s)/deciliter      Vital Signs Last 24 Hrs  T(C): 36.8 (13 Oct 2024 08:30), Max: 37.6 (13 Oct 2024 00:17)  T(F): 98.2 (13 Oct 2024 08:30), Max: 99.6 (13 Oct 2024 00:17)  HR: 84 (13 Oct 2024 08:30) (70 - 96)  BP: 103/65 (13 Oct 2024 08:30) (103/65 - 127/77)  BP(mean): --  RR: 18 (13 Oct 2024 08:30) (18 - 18)  SpO2: 96% (13 Oct 2024 08:30) (95% - 99%)    Parameters below as of 13 Oct 2024 08:30  Patient On (Oxygen Delivery Method): room air      CAPILLARY BLOOD GLUCOSE      POCT Blood Glucose.: 121 mg/dL (13 Oct 2024 08:21)  POCT Blood Glucose.: 137 mg/dL (12 Oct 2024 21:09)  POCT Blood Glucose.: 117 mg/dL (12 Oct 2024 17:07)  POCT Blood Glucose.: 140 mg/dL (12 Oct 2024 12:42)    I&O's Summary    12 Oct 2024 07:01  -  13 Oct 2024 07:00  --------------------------------------------------------  IN: 1440 mL / OUT: 750 mL / NET: 690 mL    13 Oct 2024 07:01  -  13 Oct 2024 11:57  --------------------------------------------------------  IN: 240 mL / OUT: 0 mL / NET: 240 mL        PHYSICAL EXAM:  GENERAL: NAD, well-developed  HEAD:  Atraumatic, Normocephalic  EYES: EOMI, PERRLA, conjunctiva and sclera clear  NECK: Supple, No JVD  CHEST/LUNG: Clear to auscultation bilaterally; No wheeze  HEART: Regular rate and rhythm; No murmurs, rubs, or gallops  ABDOMEN: Soft, Nontender, Nondistended; Bowel sounds present  EXTREMITIES:  2+ Peripheral Pulses, No clubbing, cyanosis, or edema  NEUROLOGY: AAOx3 (self , place and time)    LABS:                        8.2    18.52 )-----------( 360      ( 13 Oct 2024 07:07 )             27.7     10-13    128[L]  |  96  |  40[H]  ----------------------------<  106[H]  4.4   |  19[L]  |  1.31[H]    Ca    9.1      13 Oct 2024 07:10  Phos  1.7     10-13  Mg     1.7     10-12    TPro  6.4  /  Alb  2.0[L]  /  TBili  0.5  /  DBili  x   /  AST  30  /  ALT  38  /  AlkPhos  239[H]  10-13          Urinalysis Basic - ( 13 Oct 2024 07:10 )    Color: x / Appearance: x / SG: x / pH: x  Gluc: 106 mg/dL / Ketone: x  / Bili: x / Urobili: x   Blood: x / Protein: x / Nitrite: x   Leuk Esterase: x / RBC: x / WBC x   Sq Epi: x / Non Sq Epi: x / Bacteria: x        RADIOLOGY & ADDITIONAL TESTS:    Imaging Personally Reviewed:  CT ab/p: unremarkable

## 2024-10-13 NOTE — PROGRESS NOTE ADULT - PROBLEM SELECTOR PLAN 2
[facility] Discharge Orders    Name: Ciro Wells  : 1935  Planned Discharge Date: 3/17/22  Discharged to: previous independent home    MEDICAL FOLLOW UP  Follow up with primary care provider in 1-2 weeks  Follow up with specialist: ortho per recommendations; cardiology with ECHO in 3 months       FUTURE LABS:  INR due 3/21 to be drawn by home care with results to PCP; Recommend CBC and BMP be drawn at follow up with PCP    ORDER CHANGES:    Lasix dose increased to 40 mg daily by cardiology    Started on lisinopril by cardiology and dose increased due to elevated BP    Started on potassium by cardiology    DISCHARGE MEDICATIONS:  The patient s pharmacy is authorized to dispense a 30-day supply of medications. Refill requests should be directed to the primary provider, Maximo Granados     Current Outpatient Medications   Medication Sig Dispense Refill     acetaminophen (TYLENOL) 325 MG tablet Take 650 mg by mouth every 4 hours as needed       cholecalciferol 25 MCG (1000 UT) TABS Take 1,000 Units by mouth daily       furosemide (LASIX) 20 MG tablet Take 40 mg by mouth daily       lisinopril (ZESTRIL) 5 MG tablet Take 2 tablets (10 mg) by mouth daily       metoprolol succinate ER (TOPROL-XL) 100 MG 24 hr tablet Take 50 mg by mouth daily       Multiple Vitamins-Minerals (CENTRUM SILVER 50+MEN PO) Take 1 tablet by mouth daily       potassium chloride ER (K-TAB) 20 MEQ CR tablet Take 1 tablet (20 mEq) by mouth daily       triamcinolone (KENALOG) 0.1 % external cream Apply topically See Admin Instructions Apply to calves topically as needed for dermalitis apply to scaly pink areas on calves 1-2 x daily until smooth as needed       Warfarin Therapy Reminder 1 each continuous prn (per INR)         SERVICES:  Home Care:  Occupational Therapy, Physical Therapy, Registered Nurse, Home Health Aide,  and From:  [facility]    ADDITIONAL INSTRUCTIONS:    Weight bearing  restrictions:  Weight bearing as tolerated.     Continue to follow your diet:  2 gram sodium.     Weigh yourself daily in the morning and keep a record. Call your primary clinic: a) if you are more short of breath, or b) if your weight changes more than 3 pounds in one day or more than 5 pounds in one week.     TG shapes on in AM, off in PM    Frequent repositioning recommended; float heels when in bed    Wound care to buttocks: : 1.Cleanse skin with Cavilon skin cleanser 2. Apply Cavilon barrier spray to buttocks BID 3. Leave VINAY 4. Turn side to side. Avoid supine position.    Wound care to abrasion on knee and elbow: 1. Cleanse wounds BID using normal saline 2. Apply Cavilon barrier spray 3. Apply Mepilex dressings. Change every 2 days or if soiled    Notify your surgeon if you have increased redness, swelling, tenderness, or drainage at your incision site.     DO NOT DRIVE while taking narcotic pain medications.     Driving is not recommended until cleared by ortho or PCP to resume.     LORRAINE Che CNP  This document was electronically signed on March 16, 2022       likely Multifactorial malignancy, recent treatment, CKD  - h/h now stable   - hemolysis labs unremarkable     - d/w nursing staff BM wnl no concern for GIB.    - restarted AC given acute bleed ruled out

## 2024-10-13 NOTE — PROGRESS NOTE ADULT - PROBLEM SELECTOR PLAN 1
On admission, patient was confused, and tachycardic with WBC count of 25. He was recently admitted for YANIRA on CKD, s/p Elias placement. On Zosyn for sepsis likely 2/2 UTI given mental status and recent Elias placement. UA neg. Hemodynamically stable, afebrile. No abdominal tenderness on exa  - CT abd/pelvis -> emphysematous cystitis   - lactate normalized, blood/ urine cx neg   - another episode of fever, c/w cefepime UA neg, Blood cx (ngtd), CXR wnl  - c/w cefepime for now (last day of abx 10/15 change to po on d/c)

## 2024-10-13 NOTE — PROGRESS NOTE ADULT - SUBJECTIVE AND OBJECTIVE BOX
Patient is a 69y old  Male who presents with a chief complaint of AMS (12 Oct 2024 12:48)    Patient seen and examined at bedside, no acute events. Afebrile.     MEDICATIONS  (STANDING):  aspirin  chewable 81 milliGRAM(s) Oral daily  atorvastatin 10 milliGRAM(s) Oral at bedtime  bisacodyl Suppository 10 milliGRAM(s) Rectal once  cefepime   IVPB      cefepime   IVPB 2000 milliGRAM(s) IV Intermittent every 12 hours  dextrose 5%. 1000 milliLiter(s) (50 mL/Hr) IV Continuous <Continuous>  dextrose 5%. 1000 milliLiter(s) (100 mL/Hr) IV Continuous <Continuous>  dextrose 50% Injectable 12.5 Gram(s) IV Push once  dextrose 50% Injectable 25 Gram(s) IV Push once  dextrose 50% Injectable 25 Gram(s) IV Push once  folic acid 1 milliGRAM(s) Oral daily  glucagon  Injectable 1 milliGRAM(s) IntraMuscular once  influenza  Vaccine (HIGH DOSE) 0.5 milliLiter(s) IntraMuscular once  insulin lispro (ADMELOG) corrective regimen sliding scale   SubCutaneous at bedtime  insulin lispro (ADMELOG) corrective regimen sliding scale   SubCutaneous three times a day before meals  lactated ringers. 1000 milliLiter(s) (80 mL/Hr) IV Continuous <Continuous>  melatonin 2 milliGRAM(s) Oral at bedtime  metoprolol tartrate 25 milliGRAM(s) Oral two times a day  pantoprazole    Tablet 40 milliGRAM(s) Oral before breakfast  polyethylene glycol 3350 17 Gram(s) Oral daily  QUEtiapine 25 milliGRAM(s) Oral at bedtime  senna 2 Tablet(s) Oral at bedtime    MEDICATIONS  (PRN):  acetaminophen     Tablet .. 650 milliGRAM(s) Oral every 6 hours PRN Temp greater or equal to 38C (100.4F)  dextrose Oral Gel 15 Gram(s) Oral once PRN Blood Glucose LESS THAN 70 milliGRAM(s)/deciliter    Vital Signs Last 24 Hrs  T(C): 36.8 (13 Oct 2024 04:49), Max: 37.6 (13 Oct 2024 00:17)  T(F): 98.2 (13 Oct 2024 04:49), Max: 99.6 (13 Oct 2024 00:17)  HR: 93 (13 Oct 2024 04:49) (70 - 98)  BP: 109/72 (13 Oct 2024 04:49) (108/65 - 127/77)  BP(mean): --  RR: 18 (13 Oct 2024 04:49) (18 - 18)  SpO2: 97% (13 Oct 2024 04:49) (94% - 99%)    Parameters below as of 13 Oct 2024 04:49  Patient On (Oxygen Delivery Method): room air    PE  NAD  Awake, alert  Anicteric, MMM  RRR  CTAB  Abd soft, NT, ND  No c/c/e  No rash grossly                        8.2    18.52 )-----------( 360      ( 13 Oct 2024 07:07 )             27.7       10-13    128[L]  |  96  |  40[H]  ----------------------------<  106[H]  4.4   |  19[L]  |  1.31[H]    Ca    9.1      13 Oct 2024 07:10  Phos  1.7     10-13  Mg     1.7     10-12    TPro  6.4  /  Alb  2.0[L]  /  TBili  0.5  /  DBili  x   /  AST  30  /  ALT  38  /  AlkPhos  239[H]  10-13       Patient is a 69y old  Male who presents with a chief complaint of AMS (12 Oct 2024 12:48)    Patient seen and examined at bedside, no acute events. Afebrile.     MEDICATIONS  (STANDING):  aspirin  chewable 81 milliGRAM(s) Oral daily  atorvastatin 10 milliGRAM(s) Oral at bedtime  bisacodyl Suppository 10 milliGRAM(s) Rectal once  cefepime   IVPB      cefepime   IVPB 2000 milliGRAM(s) IV Intermittent every 12 hours  dextrose 5%. 1000 milliLiter(s) (50 mL/Hr) IV Continuous <Continuous>  dextrose 5%. 1000 milliLiter(s) (100 mL/Hr) IV Continuous <Continuous>  dextrose 50% Injectable 12.5 Gram(s) IV Push once  dextrose 50% Injectable 25 Gram(s) IV Push once  dextrose 50% Injectable 25 Gram(s) IV Push once  folic acid 1 milliGRAM(s) Oral daily  glucagon  Injectable 1 milliGRAM(s) IntraMuscular once  influenza  Vaccine (HIGH DOSE) 0.5 milliLiter(s) IntraMuscular once  insulin lispro (ADMELOG) corrective regimen sliding scale   SubCutaneous at bedtime  insulin lispro (ADMELOG) corrective regimen sliding scale   SubCutaneous three times a day before meals  lactated ringers. 1000 milliLiter(s) (80 mL/Hr) IV Continuous <Continuous>  melatonin 2 milliGRAM(s) Oral at bedtime  metoprolol tartrate 25 milliGRAM(s) Oral two times a day  pantoprazole    Tablet 40 milliGRAM(s) Oral before breakfast  polyethylene glycol 3350 17 Gram(s) Oral daily  QUEtiapine 25 milliGRAM(s) Oral at bedtime  senna 2 Tablet(s) Oral at bedtime    MEDICATIONS  (PRN):  acetaminophen     Tablet .. 650 milliGRAM(s) Oral every 6 hours PRN Temp greater or equal to 38C (100.4F)  dextrose Oral Gel 15 Gram(s) Oral once PRN Blood Glucose LESS THAN 70 milliGRAM(s)/deciliter    Vital Signs Last 24 Hrs  T(C): 36.8 (13 Oct 2024 04:49), Max: 37.6 (13 Oct 2024 00:17)  T(F): 98.2 (13 Oct 2024 04:49), Max: 99.6 (13 Oct 2024 00:17)  HR: 93 (13 Oct 2024 04:49) (70 - 98)  BP: 109/72 (13 Oct 2024 04:49) (108/65 - 127/77)  BP(mean): --  RR: 18 (13 Oct 2024 04:49) (18 - 18)  SpO2: 97% (13 Oct 2024 04:49) (94% - 99%)    Parameters below as of 13 Oct 2024 04:49  Patient On (Oxygen Delivery Method): room air    PE  NAD  Awake, alert  Anicteric, MMM  Abd soft, NT, ND  No c/c/e  No rash grossly                        8.2    18.52 )-----------( 360      ( 13 Oct 2024 07:07 )             27.7     10-13    128[L]  |  96  |  40[H]  ----------------------------<  106[H]  4.4   |  19[L]  |  1.31[H]    Ca    9.1      13 Oct 2024 07:10  Phos  1.7     10-13  Mg     1.7     10-12    TPro  6.4  /  Alb  2.0[L]  /  TBili  0.5  /  DBili  x   /  AST  30  /  ALT  38  /  AlkPhos  239[H]  10-13

## 2024-10-13 NOTE — PROGRESS NOTE ADULT - SUBJECTIVE AND OBJECTIVE BOX
[facility] DIVISION OF INFECTIOUS DISEASES  BRADFORD Nolasco Y. Patel, S. Shah, G. Richard  [phone]  ([phone] - weekdays after 5pm and weekends)    Name: JESSIE GREER  Age/Gender: 69y Male  MRN: [id number]    Interval History:  Patient seen and examined this morning.   No new complaints noted.  Notes reviewed  No concerning overnight events  Afebrile   Allergies: No Known Allergies      Objective:  Vitals:   T(F): 98.2 (10-13-24 @ 04:49), Max: 99.6 (10-13-24 @ 00:17)  HR: 93 (10-13-24 @ 04:49) (70 - 98)  BP: 109/72 (10-13-24 @ 04:49) (108/65 - 127/77)  RR: 18 (10-13-24 @ 04:49) (18 - 18)  SpO2: 97% (10-13-24 @ 04:49) (94% - 99%)  Physical Examination:  General: no acute distress, nontoxic appearing   HEENT: NC/AT, anicteric, EOMI  Respiratory: no acc muscle use, breathing comfortably  Cardiovascular: S1 and S2 present  Gastrointestinal: normal appearing, nondistended  Extremities: no edema, no cyanosis  Skin: no visible rash    Laboratory Studies:  CBC:                       8.2    18.52 )-----------( 360      ( 13 Oct 2024 07:07 )             27.7     WBC Trend:  18.52 10-13-24 @ 07:07  18.02 10-12-24 @ 07:12  17.83 10-12-24 @ 00:14  12.12 10-11-24 @ 18:47  18.12 10-11-24 @ 09:49  18.98 10-11-24 @ 07:21  21.80 10-10-24 @ 07:35  19.05 10-10-24 @ 01:56  25.55 10-08-24 @ 09:57  23.71 10-08-24 @ 07:17  26.53 10-07-24 @ 07:33    CMP: 10-13    128[L]  |  96  |  40[H]  ----------------------------<  106[H]  4.4   |  19[L]  |  1.31[H]    Ca    9.1      13 Oct 2024 07:10  Phos  1.7     10-13  Mg     1.7     10-12    TPro  6.4  /  Alb  2.0[L]  /  TBili  0.5  /  DBili  x   /  AST  30  /  ALT  38  /  AlkPhos  239[H]  10-13    Creatinine: 1.31 mg/dL (10-13-24 @ 07:10)  Creatinine: 1.47 mg/dL (10-12-24 @ 07:12)  Creatinine: 1.63 mg/dL (10-11-24 @ 07:21)  Creatinine: 1.49 mg/dL (10-08-24 @ 07:16)  Creatinine: 1.57 mg/dL (10-07-24 @ 07:33)    LIVER FUNCTIONS - ( 13 Oct 2024 07:10 )  Alb: 2.0 g/dL / Pro: 6.4 g/dL / ALK PHOS: 239 U/L / ALT: 38 U/L / AST: 30 U/L / GGT: x           Microbiology: reviewed   Culture - Blood (collected 10-10-24 @ 13:04)  Source: .Blood BLOOD  Preliminary Report (10-12-24 @ 18:01):    No growth at 48 Hours    Culture - Blood (collected 10-10-24 @ 12:54)  Source: .Blood BLOOD  Preliminary Report (10-12-24 @ 18:01):    No growth at 48 Hours    Culture - Urine (collected 10-02-24 @ 17:45)  Source: Clean Catch Clean Catch (Midstream)  Final Report (10-04-24 @ 03:53):    No growth    Culture - Blood (collected 10-02-24 @ 15:50)  Source: .Blood BLOOD  Final Report (10-07-24 @ 23:00):    No growth at 5 days    Culture - Blood (collected 10-02-24 @ 15:35)  Source: .Blood BLOOD  Final Report (10-07-24 @ 23:00):    No growth at 5 days    SARS-CoV-2 Result: NotDetec (10 Oct 2024 10:24)    Radiology: reviewed     Medications:  acetaminophen     Tablet .. 650 milliGRAM(s) Oral every 6 hours PRN  aspirin  chewable 81 milliGRAM(s) Oral daily  atorvastatin 10 milliGRAM(s) Oral at bedtime  bisacodyl Suppository 10 milliGRAM(s) Rectal once  cefepime   IVPB      cefepime   IVPB 2000 milliGRAM(s) IV Intermittent every 12 hours  dextrose 5%. 1000 milliLiter(s) IV Continuous <Continuous>  dextrose 5%. 1000 milliLiter(s) IV Continuous <Continuous>  dextrose 50% Injectable 25 Gram(s) IV Push once  dextrose 50% Injectable 12.5 Gram(s) IV Push once  dextrose 50% Injectable 25 Gram(s) IV Push once  dextrose Oral Gel 15 Gram(s) Oral once PRN  folic acid 1 milliGRAM(s) Oral daily  glucagon  Injectable 1 milliGRAM(s) IntraMuscular once  influenza  Vaccine (HIGH DOSE) 0.5 milliLiter(s) IntraMuscular once  insulin lispro (ADMELOG) corrective regimen sliding scale   SubCutaneous at bedtime  insulin lispro (ADMELOG) corrective regimen sliding scale   SubCutaneous three times a day before meals  lactated ringers. 1000 milliLiter(s) IV Continuous <Continuous>  melatonin 2 milliGRAM(s) Oral at bedtime  metoprolol tartrate 25 milliGRAM(s) Oral two times a day  pantoprazole    Tablet 40 milliGRAM(s) Oral before breakfast  polyethylene glycol 3350 17 Gram(s) Oral daily  QUEtiapine 25 milliGRAM(s) Oral at bedtime  senna 2 Tablet(s) Oral at bedtime    Current Antimicrobials:  cefepime   IVPB 2000 milliGRAM(s) IV Intermittent every 12 hours  cefepime   IVPB        Prior/Completed Antimicrobials:  cefepime   IVPB  piperacillin/tazobactam IVPB...

## 2024-10-13 NOTE — PROGRESS NOTE ADULT - ASSESSMENT
69-year-old male w/ PMH of HTN, HLD, T2DM, CAD and metastatic urothelial CA (follows with [facility] [place]) c/b tumor invasion and hydronephrosis, and recent admission for YANIRA on CKD that improved with Elias catheter placement presenting for mild confusion and fatigue since discharge. In the ED. patient was afebrile but tachycardic to 102. Labs significant for WBC of 25.43 and UA indeterminate. Patient admitted for AMS iso likely sepsis 2/2 UTI.

## 2024-10-13 NOTE — PROGRESS NOTE ADULT - ASSESSMENT
Metastatic urothelial cancer  --Previously on gem carbo x 6 through 12/2023 -> avelumab since 2/2024 then held since 6/18/24 due to CRI, anemia, dizziness. PET 7/2024 shows PD at multiple sites. Started treatment with enfortumab vedotin, LD 9/13/24.  --[place] [facility] [place]  --No plan for treatment while admitted    AMS (resolved)  Leukocytosis   --CT a/p w/ emphysematous cystitis  --Febrile 10/10 and 10/11 - on Cefepime, Blood Cx repeated on 10/10- NGTD. Consider repeat urine culture as well- WBCs in UA but Nitrite negative small LE. CXR negative. COVID/Flu/RSV negative and no respiratory symptoms. No diarrhea or other obvious signs/sources of infection.   --CT AP 10/12: No evidence of active hemorrhage. No retroperitoneal hematoma. Left renal cancer with tumor thrombus in the left renal vein with liver and splenic metastases, similar to 10/3/2024  --Plan to continue antibiotics to complete 14 days on 10/15     hx Hydronephrosis/ YANIRA  --Cr 1.59 on admission, Elias removed. Cr stable at 1.31  --2/2 disease burden  --CT a/p 9/24/24: Similar size of dominant 7 cm left renal mass. Increased size of right hepatic lobe lesions. Increased and decreased size of left perinephric masses. Decreased size of left para-aortic lymph node. 0.4 cm distal right ureteral stone, with proximal hydroureter and perivesicular fat stranding.  --repeat CT a/p w/ Emphysematous cystitis. Left renal mass with associated hydronephrosis similar to prior exam. Interval increase in size of one of the perinephric soft tissue implant    Hypercalcemia  --Ca 11.3 on 10/7; ionized calcium still elevated at 1.45 on 10/8. S/p fluids, Pamidronate on 10/8, levels normalized.     Anemia   --Multifactorial d/t malignancy, treatment, CKD  --Hgb 7.5-9.5 at baseline  --Transfuse for Hgb <7, 8 if symptomatic  --folate low (4.4), %sat c/w KE (12)--> folic acid ordered, consider PO iron  --Received two units pRBC on 10/11. Hgb 8.2 today.  --Hemolysis labs negative.    hx DVT  --c/w Eliquis, currently held    will follow,    Buddy Bond PA-C  Hematology/Oncology  [facility]  [phone] (office)   Metastatic urothelial cancer  --Previously on gem carbo x 6 through 12/2023 -> avelumab since 2/2024 then held since 6/18/24 due to CRI, anemia, dizziness. PET 7/2024 shows PD at multiple sites. Started treatment with enfortumab vedotin, LD 9/13/24.  --[place] [facility] [place]  --No plan for treatment while admitted    AMS (resolved)  Leukocytosis   --CT a/p w/ emphysematous cystitis  --Febrile 10/10 and 10/11 - on Cefepime, Blood Cx repeated on 10/10- NGTD. Consider repeat urine culture as well- WBCs in UA but Nitrite negative small LE. CXR negative. COVID/Flu/RSV negative and no respiratory symptoms. No diarrhea or other obvious signs/sources of infection.   --CT AP 10/12: No evidence of active hemorrhage. No retroperitoneal hematoma. Left renal cancer with tumor thrombus in the left renal vein with liver and splenic metastases, similar to 10/3/2024  --Plan to continue antibiotics to complete 14 days on 10/15     hx Hydronephrosis/ YANIRA  --Cr 1.59 on admission, Elias removed. Cr stable at 1.31  --2/2 disease burden  --CT a/p 9/24/24: Similar size of dominant 7 cm left renal mass. Increased size of right hepatic lobe lesions. Increased and decreased size of left perinephric masses. Decreased size of left para-aortic lymph node. 0.4 cm distal right ureteral stone, with proximal hydroureter and perivesicular fat stranding.  --repeat CT a/p w/ Emphysematous cystitis. Left renal mass with associated hydronephrosis similar to prior exam. Interval increase in size of one of the perinephric soft tissue implant    Hypercalcemia  --Ca 11.3 on 10/7; ionized calcium still elevated at 1.45 on 10/8. S/p fluids, Pamidronate on 10/8, levels normalized.     Anemia   --Multifactorial d/t malignancy, treatment, CKD  --Hgb 7.5-9.5 at baseline  --Transfuse for Hgb <7, 8 if symptomatic  --folate low (4.4), %sat c/w KE (12)--> folic acid ordered, consider PO iron  --Checking soluble transferrin receptor  --Received two units pRBC on 10/11. Hgb 8.2 today.  --Hemolysis labs negative.    hx DVT  --c/w Eliquis, currently held    will follow,    Buddy Bond PA-C  Hematology/Oncology  [facility]  [phone] (office)   Metastatic urothelial cancer  --Previously on gem carbo x 6 through 12/2023 -> avelumab since 2/2024 then held since 6/18/24 due to CRI, anemia, dizziness. PET 7/2024 shows PD at multiple sites. Started treatment with enfortumab vedotin, LD 9/13/24.  --Follows with Dr. Herring at [facility] [place].  --No plan for treatment while admitted    AMS (resolved)  Leukocytosis   --CT a/p w/ emphysematous cystitis  --Febrile 10/10 and 10/11 - on Cefepime, Blood Cx repeated on 10/10- NGTD. Consider repeat urine culture as well- WBCs in UA but Nitrite negative small LE. CXR negative. COVID/Flu/RSV negative and no respiratory symptoms. No diarrhea or other obvious signs/sources of infection.   --CT AP 10/12: No evidence of active hemorrhage. No retroperitoneal hematoma. Left renal cancer with tumor thrombus in the left renal vein with liver and splenic metastases, similar to 10/3/2024  --Plan to continue antibiotics to complete 14 days on 10/15     hx Hydronephrosis/ YANIRA  --Cr 1.59 on admission, Elias removed. Cr stable at 1.31  --2/2 disease burden  --CT a/p 9/24/24: Similar size of dominant 7 cm left renal mass. Increased size of right hepatic lobe lesions. Increased and decreased size of left perinephric masses. Decreased size of left para-aortic lymph node. 0.4 cm distal right ureteral stone, with proximal hydroureter and perivesicular fat stranding.  --repeat CT a/p w/ Emphysematous cystitis. Left renal mass with associated hydronephrosis similar to prior exam. Interval increase in size of one of the perinephric soft tissue implant    Hypercalcemia  --Ca 11.3 on 10/7; ionized calcium still elevated at 1.45 on 10/8. S/p fluids, Pamidronate on 10/8, levels normalized.     Anemia   --Multifactorial d/t malignancy, treatment, CKD  --Hgb 7.5-9.5 at baseline  --Transfuse for Hgb <7, 8 if symptomatic  --folate low (4.4), %sat c/w KE (12)--> folic acid ordered, consider PO iron  --Checking soluble transferrin receptor  --Received two units pRBC on 10/11. Hgb 8.2 today.  --Hemolysis labs negative.    hx DVT  --c/w Eliquis    will follow,    Buddy Bond PA-C  Hematology/Oncology  [facility]  [phone] (office)

## 2024-10-13 NOTE — PROGRESS NOTE ADULT - ASSESSMENT
69 year old male with HTN, HLD, T2DM, CAD and metastatic urothelial cancer ([place] [facility] [place] c/b tumor invasion and hydronephrosis, and recent admission for YANIRA on CKD that improved with Nolen catheter placement presenting for AMS and leukocytosis noted during [facility] [place] appt Tuesday. Wife reported nolen was removed during that appt. Patient in the ER with tachycardia, leukocytosis ~25k with no fever and was started on empiric zosyn.     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  H/o metastatic urothelial cancer c/b tumor invasion of L ureter and hydronephrosis on chemotherapy   - CTAP with emphysematous cystitis; L renal mass with associated hydronephrosis with no change; interval increase in size of one of the perinephric soft tissue implants   - Urology eval appreciated, recs noted   - UA with no significant pyuria; Ucx negative   - prior culture reviewed-no positive cultures noted   - 10/6 all micro remains negative, WBC elevation to 30k and sig above baseline  - 10/9 evening with fever x1 in between transfusion (was given 1/2 PRBC prior to fever, and then 2nd half after temp normalized)-may be transfusion related, rule out infectious cause   - COVID/Flu/RSV negative    - CXR with no pneumonia    - UA with only 14 WBCs, no bacteria, no gu sx at this time   - 10/10 pm with fever -- pt asymptomatic, WBC downtrending, Bcx NGTD x2   - 10/12 - now afebrile >24h, WBC stable; s/p 1U PRBC yesterday  - 10/12 CTAP with no e/o active or RP bleed, L renal cancer with tumor thrombus in L renal vein with liver and splenic mets-similar to 10/3 and progressed since 8/7  - 10/13 remains afebrile, WBC stable  - fevers may be inflammatory/malignancy related, no new infectious source found to date, nontoxic     s/p zosyn 10/2-10/4  s/p ceftriaxone 10/4-10/10    Recommendations:   Continue cefepime for now   If cx remain negative and if planned for d/c, can transition to cefpodoxime 200mg PO Q12h to complete 14 days on 10/15   Monitor temps/CBC, Cr   Continue rest of care per primary team       Talib Brooke M.D.  [facility], Division of Infectious Diseases  [phone]  After 5pm on weekdays and all day on weekends - please call [phone]  Available on [employer] TEAMS

## 2024-10-14 VITALS
DIASTOLIC BLOOD PRESSURE: 71 MMHG | RESPIRATION RATE: 18 BRPM | HEART RATE: 88 BPM | TEMPERATURE: 98 F | SYSTOLIC BLOOD PRESSURE: 124 MMHG | OXYGEN SATURATION: 100 %

## 2024-10-14 LAB
ANION GAP SERPL CALC-SCNC: 10 MMOL/L — SIGNIFICANT CHANGE UP (ref 5–17)
BLD GP AB SCN SERPL QL: NEGATIVE — SIGNIFICANT CHANGE UP
BUN SERPL-MCNC: 38 MG/DL — HIGH (ref 7–23)
CALCIUM SERPL-MCNC: 9.2 MG/DL — SIGNIFICANT CHANGE UP (ref 8.4–10.5)
CHLORIDE SERPL-SCNC: 97 MMOL/L — SIGNIFICANT CHANGE UP (ref 96–108)
CO2 SERPL-SCNC: 23 MMOL/L — SIGNIFICANT CHANGE UP (ref 22–31)
CREAT SERPL-MCNC: 1.38 MG/DL — HIGH (ref 0.5–1.3)
EGFR: 55 ML/MIN/1.73M2 — LOW
GLUCOSE BLDC GLUCOMTR-MCNC: 130 MG/DL — HIGH (ref 70–99)
GLUCOSE BLDC GLUCOMTR-MCNC: 153 MG/DL — HIGH (ref 70–99)
GLUCOSE SERPL-MCNC: 116 MG/DL — HIGH (ref 70–99)
HCT VFR BLD CALC: 25.3 % — LOW (ref 39–50)
HGB BLD-MCNC: 8 G/DL — LOW (ref 13–17)
MAGNESIUM SERPL-MCNC: 1.8 MG/DL — SIGNIFICANT CHANGE UP (ref 1.6–2.6)
MCHC RBC-ENTMCNC: 24.9 PG — LOW (ref 27–34)
MCHC RBC-ENTMCNC: 31.6 GM/DL — LOW (ref 32–36)
MCV RBC AUTO: 78.8 FL — LOW (ref 80–100)
NRBC # BLD: 0 /100 WBCS — SIGNIFICANT CHANGE UP (ref 0–0)
PHOSPHATE SERPL-MCNC: 2 MG/DL — LOW (ref 2.5–4.5)
PLATELET # BLD AUTO: 413 K/UL — HIGH (ref 150–400)
POTASSIUM SERPL-MCNC: 3.9 MMOL/L — SIGNIFICANT CHANGE UP (ref 3.5–5.3)
POTASSIUM SERPL-SCNC: 3.9 MMOL/L — SIGNIFICANT CHANGE UP (ref 3.5–5.3)
RBC # BLD: 3.21 M/UL — LOW (ref 4.2–5.8)
RBC # FLD: 20.9 % — HIGH (ref 10.3–14.5)
RH IG SCN BLD-IMP: POSITIVE — SIGNIFICANT CHANGE UP
SODIUM SERPL-SCNC: 130 MMOL/L — LOW (ref 135–145)
WBC # BLD: 20.64 K/UL — HIGH (ref 3.8–10.5)
WBC # FLD AUTO: 20.64 K/UL — HIGH (ref 3.8–10.5)

## 2024-10-14 PROCEDURE — 82803 BLOOD GASES ANY COMBINATION: CPT

## 2024-10-14 PROCEDURE — 71045 X-RAY EXAM CHEST 1 VIEW: CPT

## 2024-10-14 PROCEDURE — 99285 EMERGENCY DEPT VISIT HI MDM: CPT

## 2024-10-14 PROCEDURE — 82435 ASSAY OF BLOOD CHLORIDE: CPT

## 2024-10-14 PROCEDURE — 83550 IRON BINDING TEST: CPT

## 2024-10-14 PROCEDURE — 83605 ASSAY OF LACTIC ACID: CPT

## 2024-10-14 PROCEDURE — 82746 ASSAY OF FOLIC ACID SERUM: CPT

## 2024-10-14 PROCEDURE — 82247 BILIRUBIN TOTAL: CPT

## 2024-10-14 PROCEDURE — 97116 GAIT TRAINING THERAPY: CPT

## 2024-10-14 PROCEDURE — 85014 HEMATOCRIT: CPT

## 2024-10-14 PROCEDURE — 0241U: CPT

## 2024-10-14 PROCEDURE — 84132 ASSAY OF SERUM POTASSIUM: CPT

## 2024-10-14 PROCEDURE — 86850 RBC ANTIBODY SCREEN: CPT

## 2024-10-14 PROCEDURE — 84100 ASSAY OF PHOSPHORUS: CPT

## 2024-10-14 PROCEDURE — 80048 BASIC METABOLIC PNL TOTAL CA: CPT

## 2024-10-14 PROCEDURE — 87086 URINE CULTURE/COLONY COUNT: CPT

## 2024-10-14 PROCEDURE — 74177 CT ABD & PELVIS W/CONTRAST: CPT | Mod: MC

## 2024-10-14 PROCEDURE — 85730 THROMBOPLASTIN TIME PARTIAL: CPT

## 2024-10-14 PROCEDURE — 87637 SARSCOV2&INF A&B&RSV AMP PRB: CPT

## 2024-10-14 PROCEDURE — 83540 ASSAY OF IRON: CPT

## 2024-10-14 PROCEDURE — 97530 THERAPEUTIC ACTIVITIES: CPT

## 2024-10-14 PROCEDURE — 83615 LACTATE (LD) (LDH) ENZYME: CPT

## 2024-10-14 PROCEDURE — P9011: CPT

## 2024-10-14 PROCEDURE — 86985 SPLIT BLOOD OR PRODUCTS: CPT

## 2024-10-14 PROCEDURE — 82947 ASSAY GLUCOSE BLOOD QUANT: CPT

## 2024-10-14 PROCEDURE — 36415 COLL VENOUS BLD VENIPUNCTURE: CPT

## 2024-10-14 PROCEDURE — 86920 COMPATIBILITY TEST SPIN: CPT

## 2024-10-14 PROCEDURE — 87040 BLOOD CULTURE FOR BACTERIA: CPT

## 2024-10-14 PROCEDURE — 83735 ASSAY OF MAGNESIUM: CPT

## 2024-10-14 PROCEDURE — 83010 ASSAY OF HAPTOGLOBIN QUANT: CPT

## 2024-10-14 PROCEDURE — 82248 BILIRUBIN DIRECT: CPT

## 2024-10-14 PROCEDURE — 84295 ASSAY OF SERUM SODIUM: CPT

## 2024-10-14 PROCEDURE — 85610 PROTHROMBIN TIME: CPT

## 2024-10-14 PROCEDURE — 84238 ASSAY NONENDOCRINE RECEPTOR: CPT

## 2024-10-14 PROCEDURE — 97161 PT EVAL LOW COMPLEX 20 MIN: CPT

## 2024-10-14 PROCEDURE — 82040 ASSAY OF SERUM ALBUMIN: CPT

## 2024-10-14 PROCEDURE — 87640 STAPH A DNA AMP PROBE: CPT

## 2024-10-14 PROCEDURE — 96374 THER/PROPH/DIAG INJ IV PUSH: CPT

## 2024-10-14 PROCEDURE — 82962 GLUCOSE BLOOD TEST: CPT

## 2024-10-14 PROCEDURE — 85025 COMPLETE CBC W/AUTO DIFF WBC: CPT

## 2024-10-14 PROCEDURE — P9040: CPT

## 2024-10-14 PROCEDURE — 85027 COMPLETE CBC AUTOMATED: CPT

## 2024-10-14 PROCEDURE — 81001 URINALYSIS AUTO W/SCOPE: CPT

## 2024-10-14 PROCEDURE — 82330 ASSAY OF CALCIUM: CPT

## 2024-10-14 PROCEDURE — 36430 TRANSFUSION BLD/BLD COMPNT: CPT

## 2024-10-14 PROCEDURE — 86900 BLOOD TYPING SEROLOGIC ABO: CPT

## 2024-10-14 PROCEDURE — 71046 X-RAY EXAM CHEST 2 VIEWS: CPT

## 2024-10-14 PROCEDURE — 80053 COMPREHEN METABOLIC PANEL: CPT

## 2024-10-14 PROCEDURE — 87641 MR-STAPH DNA AMP PROBE: CPT

## 2024-10-14 PROCEDURE — 86901 BLOOD TYPING SEROLOGIC RH(D): CPT

## 2024-10-14 PROCEDURE — 99239 HOSP IP/OBS DSCHRG MGMT >30: CPT

## 2024-10-14 PROCEDURE — 74176 CT ABD & PELVIS W/O CONTRAST: CPT | Mod: MC

## 2024-10-14 PROCEDURE — 85018 HEMOGLOBIN: CPT

## 2024-10-14 RX ORDER — METOPROLOL TARTRATE 50 MG
1 TABLET ORAL
Qty: 60 | Refills: 0
Start: 2024-10-14 | End: 2024-11-12

## 2024-10-14 RX ORDER — QUETIAPINE FUMARATE 50 MG/1
1 TABLET, FILM COATED ORAL
Qty: 30 | Refills: 0
Start: 2024-10-14 | End: 2024-11-12

## 2024-10-14 RX ORDER — ASPIRIN 325 MG
1 TABLET ORAL
Qty: 30 | Refills: 0
Start: 2024-10-14 | End: 2024-11-12

## 2024-10-14 RX ORDER — 5-HYDROXYTRYPTOPHAN (5-HTP) 100 MG
2 TABLET,DISINTEGRATING ORAL
Qty: 60 | Refills: 0
Start: 2024-10-14 | End: 2024-11-12

## 2024-10-14 RX ORDER — SENNOSIDES 8.6 MG
2 TABLET ORAL
Qty: 0 | Refills: 0 | DISCHARGE
Start: 2024-10-14

## 2024-10-14 RX ORDER — ACETAMINOPHEN 325 MG
2 TABLET ORAL
Qty: 0 | Refills: 0 | DISCHARGE
Start: 2024-10-14

## 2024-10-14 RX ORDER — FOLIC ACID 1 MG/1
1 TABLET ORAL
Qty: 30 | Refills: 0
Start: 2024-10-14 | End: 2024-11-12

## 2024-10-14 RX ORDER — CEFPODOXIME PROXETIL 50 MG/5 ML
1 SUSPENSION, RECONSTITUTED, ORAL (ML) ORAL
Qty: 4 | Refills: 0
Start: 2024-10-14 | End: 2024-10-15

## 2024-10-14 RX ADMIN — Medication 25 MILLIGRAM(S): at 05:07

## 2024-10-14 RX ADMIN — CEFEPIME 100 MILLIGRAM(S): 2 INJECTION, POWDER, FOR SOLUTION INTRAVENOUS at 12:15

## 2024-10-14 RX ADMIN — PANTOPRAZOLE SODIUM 40 MILLIGRAM(S): 40 TABLET, DELAYED RELEASE ORAL at 05:07

## 2024-10-14 RX ADMIN — APIXABAN 5 MILLIGRAM(S): 5 TABLET, FILM COATED ORAL at 05:07

## 2024-10-14 RX ADMIN — Medication 17 GRAM(S): at 12:15

## 2024-10-14 RX ADMIN — Medication 81 MILLIGRAM(S): at 12:14

## 2024-10-14 RX ADMIN — CEFEPIME 100 MILLIGRAM(S): 2 INJECTION, POWDER, FOR SOLUTION INTRAVENOUS at 00:25

## 2024-10-14 RX ADMIN — Medication 1: at 12:22

## 2024-10-14 RX ADMIN — FOLIC ACID 1 MILLIGRAM(S): 1 TABLET ORAL at 12:15

## 2024-10-14 NOTE — PROGRESS NOTE ADULT - PROBLEM SELECTOR PLAN 8
Continue with low dose SS
DVT Ppx: Eliquis 5mg bid  Diet: Carb controlled     Dispo: d/c home tomorrow 10/9 patient's spouse in agreement with d/c plan tomorrow 10/9, YONNY improved
Continue with low dose SS

## 2024-10-14 NOTE — PROGRESS NOTE ADULT - SUBJECTIVE AND OBJECTIVE BOX
Patient is a 69y old  Male who presents with a chief complaint of AMS.    Patient seen and examined at bedside, feeling well. No new complaints.     MEDICATIONS  (STANDING):  apixaban 5 milliGRAM(s) Oral every 12 hours  aspirin  chewable 81 milliGRAM(s) Oral daily  atorvastatin 10 milliGRAM(s) Oral at bedtime  cefepime   IVPB      cefepime   IVPB 2000 milliGRAM(s) IV Intermittent every 12 hours  dextrose 5%. 1000 milliLiter(s) (100 mL/Hr) IV Continuous <Continuous>  dextrose 5%. 1000 milliLiter(s) (50 mL/Hr) IV Continuous <Continuous>  dextrose 50% Injectable 25 Gram(s) IV Push once  dextrose 50% Injectable 25 Gram(s) IV Push once  dextrose 50% Injectable 12.5 Gram(s) IV Push once  folic acid 1 milliGRAM(s) Oral daily  glucagon  Injectable 1 milliGRAM(s) IntraMuscular once  influenza  Vaccine (HIGH DOSE) 0.5 milliLiter(s) IntraMuscular once  insulin lispro (ADMELOG) corrective regimen sliding scale   SubCutaneous at bedtime  insulin lispro (ADMELOG) corrective regimen sliding scale   SubCutaneous three times a day before meals  melatonin 2 milliGRAM(s) Oral at bedtime  metoprolol tartrate 25 milliGRAM(s) Oral two times a day  pantoprazole    Tablet 40 milliGRAM(s) Oral before breakfast  polyethylene glycol 3350 17 Gram(s) Oral daily  QUEtiapine 25 milliGRAM(s) Oral at bedtime  senna 2 Tablet(s) Oral at bedtime    MEDICATIONS  (PRN):  acetaminophen     Tablet .. 650 milliGRAM(s) Oral every 6 hours PRN Temp greater or equal to 38C (100.4F)  dextrose Oral Gel 15 Gram(s) Oral once PRN Blood Glucose LESS THAN 70 milliGRAM(s)/deciliter    Vital Signs Last 24 Hrs  T(C): 36.7 (14 Oct 2024 09:12), Max: 37.4 (13 Oct 2024 21:08)  T(F): 98 (14 Oct 2024 09:12), Max: 99.4 (13 Oct 2024 21:08)  HR: 90 (14 Oct 2024 09:12) (82 - 95)  BP: 115/74 (14 Oct 2024 09:12) (110/70 - 116/72)  BP(mean): --  RR: 18 (14 Oct 2024 09:12) (18 - 18)  SpO2: 96% (14 Oct 2024 09:12) (93% - 98%)    Parameters below as of 14 Oct 2024 09:12  Patient On (Oxygen Delivery Method): room air    PE  NAD  Awake, alert  Anicteric, MMM  No c/c/e  No rash grossly                        8.0    20.64 )-----------( 413      ( 14 Oct 2024 10:35 )             25.3     10-14    130[L]  |  97  |  38[H]  ----------------------------<  116[H]  3.9   |  23  |  1.38[H]    Ca    9.2      14 Oct 2024 07:25  Phos  2.0     10-14  Mg     1.8     10-14    TPro  6.4  /  Alb  2.0[L]  /  TBili  0.5  /  DBili  x   /  AST  30  /  ALT  38  /  AlkPhos  239[H]  10-13

## 2024-10-14 NOTE — PROGRESS NOTE ADULT - PROBLEM SELECTOR PROBLEM 6
Need for prophylactic measure
Urothelial cancer
Urothelial cancer
Need for prophylactic measure
Urothelial cancer
Urothelial cancer
DVT, lower extremity
T2DM (type 2 diabetes mellitus)
Urothelial cancer
Urothelial cancer

## 2024-10-14 NOTE — PROGRESS NOTE ADULT - NSPROGADDITIONALINFOA_GEN_ALL_CORE
D/w ACP Iqra
time spent reviewing prior charts, meds, discussing plan with patient= 40 min     d/w Medicine aCP Lisa
time spent reviewing prior charts, meds, discussing plan with patient= 55 min     d/w  Medicine ACP Alicia
time spent reviewing prior charts, meds, discussing plan with patient= 40 min     d/w MEdicine ACP Ashanti
time spent reviewing prior charts, meds, discussing plan with patient= 50 min     d/w Medicine ACP Ashanti
time spent reviewing prior charts, meds, discussing plan with patient= 70 min     d/w MEdicine ACP Lisa
time spent reviewing prior charts, meds, discussing plan with patient= 40 min     d/w Medicine ACP Alicia
time spent reviewing prior charts, meds, discussing plan with patient= 60 min     d/w  Medicine ACP Lisa

## 2024-10-14 NOTE — PROGRESS NOTE ADULT - PROBLEM SELECTOR PROBLEM 8
T2DM (type 2 diabetes mellitus)
Need for prophylactic measure
T2DM (type 2 diabetes mellitus)

## 2024-10-14 NOTE — PROGRESS NOTE ADULT - ASSESSMENT
69-year-old male w/ PMH of HTN, HLD, T2DM, CAD and metastatic urothelial CA (follows with [facility]) c/b tumor invasion and hydronephrosis, and recent admission for YANIRA on CKD that improved with Elias catheter placement presenting for mild confusion and fatigue since discharge. In the ED. patient was afebrile but tachycardic to 102. Labs significant for WBC of 25.43 and UA indeterminate. Patient admitted for AMS iso likely sepsis 2/2 UTI.

## 2024-10-14 NOTE — PROGRESS NOTE ADULT - PROBLEM SELECTOR PLAN 5
now resolved s/p IVF likely from acute infection   - Scr 1.4 appears to be at new baseline  - avoid nephrotoxic meds
Patient with metastatic urothelial cancer, follows with [facility], c/b tumor invasion of L ureter and hydronephrosis. On chemotherapy, missed last session on Friday.   - f/u heme/onc consulted: no inpatient tx at this time   - Urology consulted, appreciated> PVR wnl, no need for nolen at this time    # hypercalcemia 2/2 malignancy   - pamidronate 60 mg x1 today, f/u ca outpt
Heme/onc recommended continuing AC in light of active malignancy.  - Continue with Eliquis 5mg bid
now resolved s/p IVF likely from acute infection   - Scr 1.4 appears to be at new baseline  - avoid nephrotoxic meds
Continue with low dose SS
now resolved s/p IVF likely from acute infection   - mild up trending 1.6 today c/w IVF   - avoid nephrotoxic meds  - f/u BMP in AM given IV cont 10/12
Continue with low dose SS
now resolved s/p IVF likely from acute infection   - improving   - avoid nephrotoxic meds  - f/u BMP in AM, is table d/c tomorrow
now resolved s/p IVF likely from acute infection   - mild up trending 1.6 today c/w IVF   - avoid nephrotoxic meds
now resolved s/p IVF likely from acute infection   - improving   - avoid nephrotoxic meds  - f/u BMP in AM, is table d/c tomorrow

## 2024-10-14 NOTE — PROGRESS NOTE ADULT - ASSESSMENT
Metastatic urothelial cancer  --Previously on gem carbo x 6 through 12/2023 -> avelumab since 2/2024 then held since 6/18/24 due to CRI, anemia, dizziness. PET 7/2024 shows PD at multiple sites. Started treatment with enfortumab vedotin, LD 9/13/24.  --[place]. [place] [facility] [place]  --No plan for treatment while admitted    AMS (resolved)  Leukocytosis   --CT a/p w/ emphysematous cystitis  --Febrile 10/10 and 10/11 - Continues Cefepime, Blood Cx repeated on 10/10- NGTD. Consider repeat urine culture as well- WBCs in UA but Nitrite negative small LE. CXR negative. COVID/Flu/RSV negative and no respiratory symptoms. No diarrhea or other obvious signs/sources of infection.   --CT AP 10/12: No evidence of active hemorrhage. No retroperitoneal hematoma. 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    hx Hydronephrosis/ YANIRA  --Cr 1.59 on admission, Elias removed. Cr stable at 1.38  --2/2 disease burden  --CT a/p 9/24/24: Similar size of dominant 7 cm left renal mass. Increased size of right hepatic lobe lesions. Increased and decreased size of left perinephric masses. Decreased size of left para-aortic lymph node. 0.4 cm distal right ureteral stone, with proximal hydroureter and perivesicular fat stranding.  --repeat CT a/p w/ Emphysematous cystitis. Left renal mass with associated hydronephrosis similar to prior exam. Interval increase in size of one of the perinephric soft tissue implant    Hypercalcemia  --Ca 11.3 on 10/7; ionized calcium still elevated at 1.45 on 10/8. S/p fluids, Pamidronate on 10/8, levels normalized.     Anemia   --Multifactorial d/t malignancy, treatment, CKD  --Hgb 7.5-9.5 at baseline  --Transfuse for Hgb <7, 8 if symptomatic  --folate low (4.4), %sat c/w KE (12)--> folic acid ordered, consider PO iron  --Follow up soluble transferrin receptor  --Received two units pRBC on 10/11. Hgb 8 today.  --Hemolysis labs negative.    hx DVT  --c/w Eliquis    will follow,    Buddy Bond PA-C  Hematology/Oncology  [facility]  [phone] (office)

## 2024-10-14 NOTE — PROGRESS NOTE ADULT - SUBJECTIVE AND OBJECTIVE BOX
[facility] DIVISION OF INFECTIOUS DISEASES  BRADFORD Nolasco Y. Patel, S. Shah, G. Richard  [phone]  ([phone] - weekdays after 5pm and weekends)    Name: JESSIE GREER  Age/Gender: 69y Male  MRN: [id number]    Interval History:  Patient seen and examined this morning.   Feels well, no fevers, pain or new complaints  Notes reviewed.   No concerning overnight events.  Afebrile.   Allergies: No Known Allergies      Objective:  Vitals:   T(F): 98 (10-14-24 @ 09:12), Max: 99.4 (10-13-24 @ 21:08)  HR: 90 (10-14-24 @ 09:12) (82 - 95)  BP: 115/74 (10-14-24 @ 09:12) (110/70 - 116/72)  RR: 18 (10-14-24 @ 09:12) (18 - 18)  SpO2: 96% (10-14-24 @ 09:12) (93% - 98%)  Physical Examination:  General: no acute distress, nontoxic  HEENT: NC/AT, EOMI, anicteric  Cardio: S1, S2 present, normal rate  Resp: clear to auscultation bilaterally  Abd: soft, nondistended, nontender   Neuro: AAOx3, no obvious focal deficits  Ext: no edema, no cyanosis  Skin: warm, dry, no visible rash  Lines: PIV    Laboratory Studies:  CBC:                       8.0    20.64 )-----------( 413      ( 14 Oct 2024 10:35 )             25.3     WBC Trend:  20.64 10-14-24 @ 10:35  18.52 10-13-24 @ 07:07  18.02 10-12-24 @ 07:12  17.83 10-12-24 @ 00:14  12.12 10-11-24 @ 18:47  18.12 10-11-24 @ 09:49  18.98 10-11-24 @ 07:21  21.80 10-10-24 @ 07:35  19.05 10-10-24 @ 01:56  25.55 10-08-24 @ 09:57  23.71 10-08-24 @ 07:17    CMP: 10-14    130[L]  |  97  |  38[H]  ----------------------------<  116[H]  3.9   |  23  |  1.38[H]    Ca    9.2      14 Oct 2024 07:25  Phos  2.0     10-14  Mg     1.8     10-14    TPro  6.4  /  Alb  2.0[L]  /  TBili  0.5  /  DBili  x   /  AST  30  /  ALT  38  /  AlkPhos  239[H]  10-13    Creatinine: 1.38 mg/dL (10-14-24 @ 07:25)  Creatinine: 1.31 mg/dL (10-13-24 @ 07:10)  Creatinine: 1.47 mg/dL (10-12-24 @ 07:12)  Creatinine: 1.63 mg/dL (10-11-24 @ 07:21)  Creatinine: 1.49 mg/dL (10-08-24 @ 07:16)    LIVER FUNCTIONS - ( 13 Oct 2024 07:10 )  Alb: 2.0 g/dL / Pro: 6.4 g/dL / ALK PHOS: 239 U/L / ALT: 38 U/L / AST: 30 U/L / GGT: x           Microbiology: reviewed   Culture - Blood (collected 10-10-24 @ 13:04)  Source: .Blood BLOOD  Preliminary Report (10-13-24 @ 18:01):    No growth at 72 Hours    Culture - Blood (collected 10-10-24 @ 12:54)  Source: .Blood BLOOD  Preliminary Report (10-13-24 @ 18:01):    No growth at 72 Hours    Culture - Urine (collected 10-02-24 @ 17:45)  Source: Clean Catch Clean Catch (Midstream)  Final Report (10-04-24 @ 03:53):    No growth    Culture - Blood (collected 10-02-24 @ 15:50)  Source: .Blood BLOOD  Final Report (10-07-24 @ 23:00):    No growth at 5 days    Culture - Blood (collected 10-02-24 @ 15:35)  Source: .Blood BLOOD  Final Report (10-07-24 @ 23:00):    No growth at 5 days    SARS-CoV-2 Result: NotDetec (10 Oct 2024 10:24)    Radiology: reviewed     Medications:  acetaminophen     Tablet .. 650 milliGRAM(s) Oral every 6 hours PRN  apixaban 5 milliGRAM(s) Oral every 12 hours  aspirin  chewable 81 milliGRAM(s) Oral daily  atorvastatin 10 milliGRAM(s) Oral at bedtime  cefepime   IVPB 2000 milliGRAM(s) IV Intermittent every 12 hours  cefepime   IVPB      dextrose 5%. 1000 milliLiter(s) IV Continuous <Continuous>  dextrose 5%. 1000 milliLiter(s) IV Continuous <Continuous>  dextrose 50% Injectable 12.5 Gram(s) IV Push once  dextrose 50% Injectable 25 Gram(s) IV Push once  dextrose 50% Injectable 25 Gram(s) IV Push once  dextrose Oral Gel 15 Gram(s) Oral once PRN  folic acid 1 milliGRAM(s) Oral daily  glucagon  Injectable 1 milliGRAM(s) IntraMuscular once  influenza  Vaccine (HIGH DOSE) 0.5 milliLiter(s) IntraMuscular once  insulin lispro (ADMELOG) corrective regimen sliding scale   SubCutaneous at bedtime  insulin lispro (ADMELOG) corrective regimen sliding scale   SubCutaneous three times a day before meals  melatonin 2 milliGRAM(s) Oral at bedtime  metoprolol tartrate 25 milliGRAM(s) Oral two times a day  pantoprazole    Tablet 40 milliGRAM(s) Oral before breakfast  polyethylene glycol 3350 17 Gram(s) Oral daily  QUEtiapine 25 milliGRAM(s) Oral at bedtime  senna 2 Tablet(s) Oral at bedtime    Current Antimicrobials:  cefepime   IVPB      cefepime   IVPB 2000 milliGRAM(s) IV Intermittent every 12 hours    Prior/Completed Antimicrobials:  cefepime   IVPB  piperacillin/tazobactam IVPB...

## 2024-10-14 NOTE — PROGRESS NOTE ADULT - PROVIDER SPECIALTY LIST ADULT
Geriatrics
Infectious Disease
Heme/Onc
Infectious Disease
Heme/Onc
Infectious Disease
Heme/Onc
Infectious Disease
Hospitalist
Geriatrics
Hospitalist

## 2024-10-14 NOTE — PROGRESS NOTE ADULT - PROBLEM SELECTOR PLAN 7
Heme/onc recommended continuing AC in light of active malignancy.  - Continue with Eliquis 5mg bid
DVT Ppx: Eliquis 5mg bid  Diet: Carb controlled     Dispo: likely d/c home tomorrow 10/8 pending clinical improvement
Continue with low dose SS
Heme/onc recommended continuing AC in light of active malignancy.  - Continue with Eliquis 5mg bid

## 2024-10-14 NOTE — PROGRESS NOTE ADULT - NS ATTEND AMEND GEN_ALL_CORE FT
Agree with above assessment and plan.
Agree with above assessment and plan.   Febrile overnight- reculture. Transfusion to evaluate if related to PRBC. Continue to monitor. Follow up at [facility] [place] after discharge
I agree with the above assessment and plan
clinically improving  still with leukocytosis which has been persistent since outpatient, presumed 2/2 disease process  microcytic anemia  would benefit from full heme work-up outpatient after discharge   follow-up with [facility] for urothelial ca
hg lower today. plan for prbc. calcium normalized
Agree with above assessment and plan.   Lengthy discussion with patient and family at bedside. Hb 7.0, please transfuse 1 unit PRBC. Per patient cardiologist goal hemoglobin is 8.5. Agree with urology evaluation. No plans for inpatient treatment. Follow up at [facility] [place] after discharge for further cancer directed care.
I agree with the above assessment and plan
I agree with the above assessment and plan
Patient seen and examined with NP during rounds  I agree with her assessment and plan    persistent hypercalcemia, AMS  Would recommend pamidraonate 60mg, adjusted to GFR
CT w/L renal ca w/tumor thrombus in L renal vein, mets in liver and spleen, similar to 10/3 and progressed since August  No systemic cancer treatment while inpatient, will f/u with [facility] to resume outpatient after discharge  Continues on antibiotics for now  Remains anemic, check soluble transferrin receptor

## 2024-10-14 NOTE — PROGRESS NOTE ADULT - PROBLEM SELECTOR PROBLEM 5
DVT, lower extremity
T2DM (type 2 diabetes mellitus)
ATN (acute tubular necrosis)
ATN (acute tubular necrosis)
T2DM (type 2 diabetes mellitus)
ATN (acute tubular necrosis)
ATN (acute tubular necrosis)
Urothelial cancer
ATN (acute tubular necrosis)
ATN (acute tubular necrosis)

## 2024-10-14 NOTE — PROGRESS NOTE ADULT - PROBLEM SELECTOR PROBLEM 3
Toxic metabolic encephalopathy
Urothelial cancer
HLD (hyperlipidemia)
Urothelial cancer
Urothelial cancer
ATN (acute tubular necrosis)
Toxic metabolic encephalopathy
Urothelial cancer
Toxic metabolic encephalopathy
Complex Repair And Modified Advancement Flap Text: The defect edges were debeveled with a #15 scalpel blade.  The primary defect was closed partially with a complex linear closure.  Given the location of the remaining defect, shape of the defect and the proximity to free margins a modified advancement flap was deemed most appropriate for complete closure of the defect.  Using a sterile surgical marker, an appropriate advancement flap was drawn incorporating the defect and placing the expected incisions within the relaxed skin tension lines where possible.    The area thus outlined was incised deep to adipose tissue with a #15 scalpel blade.  The skin margins were undermined to an appropriate distance in all directions utilizing iris scissors.

## 2024-10-14 NOTE — PROGRESS NOTE ADULT - PROBLEM SELECTOR PLAN 9
DVT Ppx: AC restarted   Diet: Carb controlled     Dispo: DC home
DVT Ppx: Eliquis 5mg bid  Diet: Carb controlled     Dispo: medically stable for d/c home today pending post tx CBC
DVT Ppx: AC on hold for now give drop in h/h  Diet: Carb controlled     Dispo: pending repeat infectious f/u
DVT Ppx: Eliquis 5mg bid  Diet: Carb controlled     Dispo: pending repeat infectious f/u
DVT Ppx: AC on hold for now give drop in h/h  Diet: Carb controlled     Dispo: pending clinical improvement
DVT Ppx: AC restarted   Diet: Carb controlled     Dispo: medically stable for d/c tomorrow If labs stable d/w Patient's wife in detail 10/14

## 2024-10-14 NOTE — PROGRESS NOTE ADULT - PROBLEM SELECTOR PLAN 4
Heme/onc recommended continuing AC in light of active malignancy.  - Continue with Eliquis 5mg bid
Patient with metastatic urothelial cancer, follows with [facility], c/b tumor invasion of L ureter and hydronephrosis. On chemotherapy, missed last session on Friday.   - f/u heme/onc consulted: no inpatient tx at this time   - Urology consulted, appreciated> PVR wnl, no need for nolen at this time    # hypercalcemia 2/2 malignancy   - started IVF   - f/u ionized ca in AM  - hematology informed
now resolved s/p IVF likely from acute infection   - Scr 1.49 today appears to be at new baseline  - f/u BMP   - avoid nephrotoxic meds
Continue home medication Atorvastatin 10mg qhs, ASA 81 mg qd
Heme/onc recommended continuing AC in light of active malignancy.  - Continue with Eliquis 5mg bid
Continue home medication Atorvastatin 10mg qhs, ASA 81 mg qd
Heme/onc recommended continuing AC in light of active malignancy.  - Continue with Eliquis 5mg bid
Continue home medication Atorvastatin 10mg qhs, ASA 81 mg qd
Heme/onc recommended continuing AC in light of active malignancy.  - Continue with Eliquis 5mg bid
Continue home medication Atorvastatin 10mg qhs, ASA 81 mg qd

## 2024-10-14 NOTE — PROGRESS NOTE ADULT - TIME BILLING
- Ordering, reviewing, and/or interpreting labs, testing, and/or imaging  - Independently obtaining a review of systems and performing a physical exam  - Reviewing prior records and where necessary, outpatient records  - Counselling and educating patient and/or family regarding interpretation of aforementioned items and plan of care
- Ordering, reviewing, and/or interpreting labs, testing, and/or imaging  - Independently obtaining a review of systems and performing a physical exam  - Reviewing prior records and where necessary, outpatient records  - Counselling and educating patient and/or family regarding interpretation of aforementioned items and plan of care
DC time 50 min

## 2024-10-14 NOTE — PROGRESS NOTE ADULT - PROBLEM SELECTOR PLAN 2
likely Multifactorial malignancy, recent treatment, CKD  - h/h now stable   - hemolysis labs unremarkable     - d/w nursing staff BM wnl no concern for GIB.    - restarted AC given acute bleed ruled out

## 2024-10-14 NOTE — PROGRESS NOTE ADULT - PROBLEM SELECTOR PROBLEM 2
HLD (hyperlipidemia)
Anemia
Anemia
HLD (hyperlipidemia)
HLD (hyperlipidemia)
Anemia
Anemia
HLD (hyperlipidemia)
Anemia
HLD (hyperlipidemia)
Toxic metabolic encephalopathy
Anemia

## 2024-10-14 NOTE — PROGRESS NOTE ADULT - PROBLEM SELECTOR PROBLEM 7
DVT, lower extremity
Need for prophylactic measure
DVT, lower extremity
T2DM (type 2 diabetes mellitus)

## 2024-10-14 NOTE — PROGRESS NOTE ADULT - NUTRITIONAL ASSESSMENT
This patient has been assessed with a concern for Malnutrition and has been determined to have a diagnosis/diagnoses of Severe protein-calorie malnutrition.    This patient is being managed with:   Diet Consistent Carbohydrate/No Snacks-  Entered: Oct  2 2024 10:06PM  

## 2024-10-14 NOTE — PROGRESS NOTE ADULT - ASSESSMENT
69 year old male with HTN, HLD, T2DM, CAD and metastatic urothelial cancer (follows with [facility] [place]) c/b tumor invasion and hydronephrosis, and recent admission for YANIRA on CKD that improved with Nolen catheter placement presenting for AMS and leukocytosis noted during [facility] appt Tuesday. Wife reported nolen was removed during that appt. Patient in the ER with tachycardia, leukocytosis ~25k with no fever and was started on empiric zosyn.     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  H/o metastatic urothelial cancer c/b tumor invasion of L ureter and hydronephrosis on chemotherapy   - CTAP with emphysematous cystitis; L renal mass with associated hydronephrosis with no change; interval increase in size of one of the perinephric soft tissue implants   - Urology eval appreciated, recs noted   - UA with no significant pyuria; Ucx negative   - 10/6 all micro remains negative, WBC elevation to 30k and sig above baseline  - 10/9 evening with fever x1 in between transfusion (was given 1/2 PRBC prior to fever, and then 2nd half after temp normalized)-may be transfusion related, rule out infectious cause   - COVID/Flu/RSV negative    - CXR with no pneumonia    - UA with only 14 WBCs, no bacteria, no gu sx at this time   - 10/10 pm with fever -- pt asymptomatic, WBC downtrending, Bcx NGTD x2   - 10/12 - now afebrile >24h, WBC stable; s/p 1U PRBC  10/11  - 10/12 CTAP with no e/o active or RP bleed, L renal cancer with tumor thrombus in L renal vein with liver and splenic mets-similar to 10/3 and progressed since 8/7  - 10/13 remains afebrile, WBC stable  - fevers may be inflammatory/malignancy related, no new infectious source found to date, nontoxic     s/p zosyn 10/2-10/4  s/p ceftriaxone 10/4-10/10    Recommendations:   Continue cefepime for now   If cx remain negative and if planned for d/c, can transition to cefpodoxime 200mg PO Q12h to complete 14 days on 10/15   Monitor temps/CBC, Cr   Continue rest of care per primary team   Stable from ID standpoint at this time.       Talib Brooke M.D.  [facility], Division of Infectious Diseases  [phone]  After 5pm on weekdays and all day on weekends - please call [phone]  Available on [employer] TEAMS

## 2024-10-14 NOTE — PROGRESS NOTE ADULT - SUBJECTIVE AND OBJECTIVE BOX
Patient is a 69y old  Male who presents with a chief complaint of AMS (14 Oct 2024 11:08)      SUBJECTIVE / OVERNIGHT EVENTS: pt feels ok wants to go home, no cp, sob, abd pain     MEDICATIONS  (STANDING):  apixaban 5 milliGRAM(s) Oral every 12 hours  aspirin  chewable 81 milliGRAM(s) Oral daily  atorvastatin 10 milliGRAM(s) Oral at bedtime  cefepime   IVPB      cefepime   IVPB 2000 milliGRAM(s) IV Intermittent every 12 hours  dextrose 5%. 1000 milliLiter(s) (100 mL/Hr) IV Continuous <Continuous>  dextrose 5%. 1000 milliLiter(s) (50 mL/Hr) IV Continuous <Continuous>  dextrose 50% Injectable 25 Gram(s) IV Push once  dextrose 50% Injectable 25 Gram(s) IV Push once  dextrose 50% Injectable 12.5 Gram(s) IV Push once  folic acid 1 milliGRAM(s) Oral daily  glucagon  Injectable 1 milliGRAM(s) IntraMuscular once  influenza  Vaccine (HIGH DOSE) 0.5 milliLiter(s) IntraMuscular once  insulin lispro (ADMELOG) corrective regimen sliding scale   SubCutaneous three times a day before meals  insulin lispro (ADMELOG) corrective regimen sliding scale   SubCutaneous at bedtime  melatonin 2 milliGRAM(s) Oral at bedtime  metoprolol tartrate 25 milliGRAM(s) Oral two times a day  pantoprazole    Tablet 40 milliGRAM(s) Oral before breakfast  polyethylene glycol 3350 17 Gram(s) Oral daily  QUEtiapine 25 milliGRAM(s) Oral at bedtime  senna 2 Tablet(s) Oral at bedtime    MEDICATIONS  (PRN):  acetaminophen     Tablet .. 650 milliGRAM(s) Oral every 6 hours PRN Temp greater or equal to 38C (100.4F)  dextrose Oral Gel 15 Gram(s) Oral once PRN Blood Glucose LESS THAN 70 milliGRAM(s)/deciliter        CAPILLARY BLOOD GLUCOSE      POCT Blood Glucose.: 130 mg/dL (14 Oct 2024 08:01)  POCT Blood Glucose.: 136 mg/dL (13 Oct 2024 21:15)  POCT Blood Glucose.: 132 mg/dL (13 Oct 2024 17:05)  POCT Blood Glucose.: 151 mg/dL (13 Oct 2024 12:22)    I&O's Summary    13 Oct 2024 07:01  -  14 Oct 2024 07:00  --------------------------------------------------------  IN: 240 mL / OUT: 0 mL / NET: 240 mL    14 Oct 2024 07:01  -  14 Oct 2024 11:24  --------------------------------------------------------  IN: 720 mL / OUT: 0 mL / NET: 720 mL        PHYSICAL EXAM:  GENERAL: NAD, well-developed  HEAD:  Atraumatic, Normocephalic  EYES: EOMI, PERRLA, conjunctiva and sclera clear  NECK: Supple, No JVD  CHEST/LUNG: Clear to auscultation bilaterally; No wheeze  HEART: Regular rate and rhythm; No murmurs, rubs, or gallops  ABDOMEN: Soft, Nontender, Nondistended; Bowel sounds present  EXTREMITIES:  2+ Peripheral Pulses, No clubbing, cyanosis, or edema  PSYCH: AAOx3  NEUROLOGY: non-focal  SKIN: No rashes or lesions    LABS:                        8.0    20.64 )-----------( 413      ( 14 Oct 2024 10:35 )             25.3     10-14    130[L]  |  97  |  38[H]  ----------------------------<  116[H]  3.9   |  23  |  1.38[H]    Ca    9.2      14 Oct 2024 07:25  Phos  2.0     10-14  Mg     1.8     10-14    TPro  6.4  /  Alb  2.0[L]  /  TBili  0.5  /  DBili  x   /  AST  30  /  ALT  38  /  AlkPhos  239[H]  10-13          Urinalysis Basic - ( 14 Oct 2024 07:25 )    Color: x / Appearance: x / SG: x / pH: x  Gluc: 116 mg/dL / Ketone: x  / Bili: x / Urobili: x   Blood: x / Protein: x / Nitrite: x   Leuk Esterase: x / RBC: x / WBC x   Sq Epi: x / Non Sq Epi: x / Bacteria: x        RADIOLOGY & ADDITIONAL TESTS:    Imaging Personally Reviewed:    Consultant(s) Notes Reviewed:      Care Discussed with Consultants/Other Providers:

## 2024-10-16 LAB — STFR SERPL-MCNC: 12.4 NMOL/L — SIGNIFICANT CHANGE UP (ref 12.2–27.3)

## (undated) DEVICE — FLOORMAT SURGISAFE ABSORBANT WHITE 36" X 72"

## (undated) DEVICE — DRAPE TOWEL BLUE 17" X 24"

## (undated) DEVICE — DRAPE DRAINAGE BAG URO CATCH II

## (undated) DEVICE — SOL IRR BAG NS 0.9% 3000ML

## (undated) DEVICE — GLV 7.5 PROTEXIS (WHITE)

## (undated) DEVICE — FOLEY HOLDER STATLOCK 2 WAY ADULT

## (undated) DEVICE — SOL IRR POUR H2O 1000ML

## (undated) DEVICE — PACK CYSTO

## (undated) DEVICE — SOL INJ NS 0.9% 1000ML

## (undated) DEVICE — VENODYNE/SCD SLEEVE CALF MEDIUM

## (undated) DEVICE — PLV-SCD MACHINE: Type: DURABLE MEDICAL EQUIPMENT

## (undated) DEVICE — TUBING TUR 4 PRONG

## (undated) DEVICE — SYR LUER LOK 10CC

## (undated) DEVICE — VENODYNE/SCD SLEEVE CALF LARGE

## (undated) DEVICE — WARMING BLANKET UPPER ADULT

## (undated) DEVICE — Device

## (undated) DEVICE — DRAINAGE BAG URINARY 2L

## (undated) DEVICE — PLV-LASER - HOLMIUM H30-0809: Type: DURABLE MEDICAL EQUIPMENT